# Patient Record
Sex: MALE | Race: WHITE | NOT HISPANIC OR LATINO | Employment: OTHER | URBAN - METROPOLITAN AREA
[De-identification: names, ages, dates, MRNs, and addresses within clinical notes are randomized per-mention and may not be internally consistent; named-entity substitution may affect disease eponyms.]

---

## 2019-04-29 ENCOUNTER — OFFICE VISIT (OUTPATIENT)
Dept: OPHTHALMOLOGY | Facility: CLINIC | Age: 64
End: 2019-04-29
Payer: MEDICARE

## 2019-04-29 DIAGNOSIS — H25.13 AGE-RELATED NUCLEAR CATARACT OF BOTH EYES: Primary | ICD-10-CM

## 2019-04-29 PROCEDURE — 92015 PR REFRACTION: ICD-10-PCS | Mod: S$GLB,,, | Performed by: OPHTHALMOLOGY

## 2019-04-29 PROCEDURE — 99999 PR PBB SHADOW E&M-NEW PATIENT-LVL III: ICD-10-PCS | Mod: PBBFAC,,, | Performed by: OPHTHALMOLOGY

## 2019-04-29 PROCEDURE — 92004 PR EYE EXAM, NEW PATIENT,COMPREHESV: ICD-10-PCS | Mod: S$GLB,,, | Performed by: OPHTHALMOLOGY

## 2019-04-29 PROCEDURE — 92015 DETERMINE REFRACTIVE STATE: CPT | Mod: S$GLB,,, | Performed by: OPHTHALMOLOGY

## 2019-04-29 PROCEDURE — 92004 COMPRE OPH EXAM NEW PT 1/>: CPT | Mod: S$GLB,,, | Performed by: OPHTHALMOLOGY

## 2019-04-29 PROCEDURE — 99999 PR PBB SHADOW E&M-NEW PATIENT-LVL III: CPT | Mod: PBBFAC,,, | Performed by: OPHTHALMOLOGY

## 2019-04-29 RX ORDER — LEVOTHYROXINE SODIUM 137 UG/1
TABLET ORAL
Refills: 0 | COMMUNITY
Start: 2019-04-25 | End: 2020-11-16

## 2019-04-29 RX ORDER — MONTELUKAST SODIUM 10 MG/1
10 TABLET ORAL DAILY
Refills: 3 | COMMUNITY
Start: 2019-03-16 | End: 2020-11-16 | Stop reason: SDUPTHER

## 2019-04-29 RX ORDER — TAMSULOSIN HYDROCHLORIDE 0.4 MG/1
1 CAPSULE ORAL DAILY
Refills: 3 | COMMUNITY
Start: 2019-04-25 | End: 2020-11-16

## 2019-04-29 RX ORDER — LISINOPRIL AND HYDROCHLOROTHIAZIDE 20; 25 MG/1; MG/1
1 TABLET ORAL DAILY
Refills: 3 | COMMUNITY
Start: 2019-03-16 | End: 2020-11-16 | Stop reason: SDUPTHER

## 2019-04-29 RX ORDER — ALLOPURINOL 300 MG/1
300 TABLET ORAL DAILY PRN
Refills: 3 | COMMUNITY
Start: 2019-04-25 | End: 2020-11-16 | Stop reason: SDUPTHER

## 2019-04-29 RX ORDER — METFORMIN HYDROCHLORIDE 500 MG/1
500 TABLET, EXTENDED RELEASE ORAL DAILY
Refills: 3 | COMMUNITY
Start: 2019-03-16 | End: 2020-11-16 | Stop reason: SDUPTHER

## 2019-04-29 NOTE — PROGRESS NOTES
HPI     Diabetic Eye Exam      Additional comments: DM eye exam              Comments     DLE: x 1 yr    Pt states decrease in dist va over past year. + he is a  and   has been having more trouble driving at night due to headlights. Too   bright and has halos. + h/o of floaters over past yr with occasional   flash.     LBSL: does not ck  LA1C: unknown            Last edited by Cheryle Quintana on 4/29/2019  2:54 PM. (History)        ROS     Negative for: Constitutional, Gastrointestinal, Neurological, Skin,   Genitourinary, Musculoskeletal, HENT, Endocrine, Cardiovascular, Eyes,   Respiratory, Psychiatric, Allergic/Imm, Heme/Lymph    Last edited by Gabe Moses Jr., MD on 4/29/2019  3:59 PM. (History)        Assessment /Plan     For exam results, see Encounter Report.    Age-related nuclear cataract of both eyes    -schedule cataract surgery Right eye  -R&B benefits discussed with patient  -Risks of worse vision, loss of vision, infection, bleeding, re-surgery,and may need to have surgery done by a different physician was explained to the patient  -patient was also counseled on premium lens options, laser cataract, and astigmatic correction  -patient was also counseled that they may still need glasses after surgery to help improve their vision, especially the need for reading glasses for reading after surgery  -patient understood the risks involved with cataract surgery and wanted to move forward with surgery  Follow up for Measurements, H&P, and consents.

## 2019-04-29 NOTE — PATIENT INSTRUCTIONS
After Cataract Surgery: Long-Term Eye Care    After cataract surgery, it is important to have regular eye exams. This is the best way to check the health of your eyes. It will help you maintain good vision. Schedule an eye exam at least once a year or as directed by your eye care professional.  New eyeglasses  Your vision will be better after surgery. But you may need new eyeglasses to fine-tune your vision. Your eye doctor will test your vision while youre healing. When youre fully healed, you will get a new eyeglass prescription if needed.  Treating a secondary cataract  Months or years after surgery, a secondary cataract may form. It is caused by cells that grow between your new lens and the capsule that holds it. This cataract is not painful. But it may cause vision problems similar to the first cataract. In most cases, this cataract can be treated in your eye doctors office or an outpatient surgical center.  Laser treatment (YAG capsulotomy) can be used for this. It is a painless procedure. It only takes a few minutes. A laser beam is used to make a small opening in the eyes capsule. This allows more light to enter. It will improve your vision right away.  Date Last Reviewed: 6/14/2015  © 5498-2502 [employer]. [address] [place] [address], [place] [address]. All rights reserved. This information is not intended as a substitute for professional medical care. Always follow your healthcare professional's instructions.

## 2019-04-30 ENCOUNTER — TELEPHONE (OUTPATIENT)
Dept: OPHTHALMOLOGY | Facility: CLINIC | Age: 64
End: 2019-04-30

## 2019-04-30 NOTE — TELEPHONE ENCOUNTER
----- Message from Marina Eugene sent at 4/30/2019  9:58 AM CDT -----  Contact: Wife Gabi   Wife Gabi need to speak with nurse Hodge regarding surgery date, please call back at [phone]. Patient is scheduled to fly to [place] 7/1, patient need the next available asap

## 2019-04-30 NOTE — TELEPHONE ENCOUNTER
Called pt and spoke with his wife to schedule cataract surgery with Dr Moses on 6/13 and pre-op on 5/31.

## 2019-05-31 ENCOUNTER — OFFICE VISIT (OUTPATIENT)
Dept: OPHTHALMOLOGY | Facility: CLINIC | Age: 64
End: 2019-05-31
Payer: MEDICARE

## 2019-05-31 VITALS — DIASTOLIC BLOOD PRESSURE: 73 MMHG | HEART RATE: 74 BPM | SYSTOLIC BLOOD PRESSURE: 117 MMHG

## 2019-05-31 DIAGNOSIS — H25.11 AGE-RELATED NUCLEAR CATARACT OF RIGHT EYE: Primary | ICD-10-CM

## 2019-05-31 PROCEDURE — 99499 UNLISTED E&M SERVICE: CPT | Mod: S$GLB,,, | Performed by: OPHTHALMOLOGY

## 2019-05-31 PROCEDURE — 99499 NO LOS: ICD-10-PCS | Mod: S$GLB,,, | Performed by: OPHTHALMOLOGY

## 2019-05-31 PROCEDURE — 99999 PR PBB SHADOW E&M-EST. PATIENT-LVL III: CPT | Mod: PBBFAC,,, | Performed by: OPHTHALMOLOGY

## 2019-05-31 PROCEDURE — 99999 PR PBB SHADOW E&M-EST. PATIENT-LVL III: ICD-10-PCS | Mod: PBBFAC,,, | Performed by: OPHTHALMOLOGY

## 2019-05-31 RX ORDER — PREDNISOLONE ACETATE 10 MG/ML
1 SUSPENSION/ DROPS OPHTHALMIC 4 TIMES DAILY
Qty: 10 ML | Refills: 3 | Status: SHIPPED | OUTPATIENT
Start: 2019-06-14 | End: 2019-07-20

## 2019-05-31 RX ORDER — PHENYLEPHRINE HYDROCHLORIDE 100 MG/ML
1 SOLUTION/ DROPS OPHTHALMIC
Status: CANCELLED | OUTPATIENT
Start: 2019-05-31 | End: 2019-05-31

## 2019-05-31 RX ORDER — DICLOFENAC SODIUM 1 MG/ML
1 SOLUTION/ DROPS OPHTHALMIC 4 TIMES DAILY
Qty: 5 ML | Refills: 3 | Status: SHIPPED | OUTPATIENT
Start: 2019-06-10 | End: 2019-07-20

## 2019-05-31 RX ORDER — PROPARACAINE HYDROCHLORIDE 5 MG/ML
1 SOLUTION/ DROPS OPHTHALMIC
Status: CANCELLED | OUTPATIENT
Start: 2019-05-31 | End: 2019-05-31

## 2019-05-31 RX ORDER — PHENYLEPHRINE HYDROCHLORIDE 25 MG/ML
1 SOLUTION/ DROPS OPHTHALMIC
Status: CANCELLED | OUTPATIENT
Start: 2019-05-31 | End: 2019-05-31

## 2019-05-31 RX ORDER — TROPICAMIDE 10 MG/ML
1 SOLUTION/ DROPS OPHTHALMIC
Status: CANCELLED | OUTPATIENT
Start: 2019-05-31 | End: 2019-05-31

## 2019-05-31 RX ORDER — MOXIFLOXACIN 5 MG/ML
1 SOLUTION/ DROPS OPHTHALMIC 4 TIMES DAILY
Qty: 3 ML | Refills: 1 | Status: SHIPPED | OUTPATIENT
Start: 2019-06-14 | End: 2019-07-01

## 2019-05-31 NOTE — H&P (VIEW-ONLY)
CC: H&P for cataract surgery  HPI: Patient has been diagnosed with cataracts, which are interfering with daily activities due to blurred vision and glare which cannot adequately be corrected with glasses.   PMH:  Past Medical History:   Diagnosis Date    Arthritis     Asthma     Cataract     Diabetic retinopathy     Gout attack     Hypertension        FH:  Family History   Problem Relation Age of Onset    Cancer Mother     Alzheimer's disease Father     No Known Problems Sister     No Known Problems Maternal Grandmother     No Known Problems Maternal Grandfather     No Known Problems Paternal Grandmother     No Known Problems Paternal Grandfather     No Known Problems Sister     No Known Problems Sister     Amblyopia Neg Hx     Blindness Neg Hx     Cataracts Neg Hx     Diabetes Neg Hx     Hypertension Neg Hx     Macular degeneration Neg Hx     Glaucoma Neg Hx     Retinal detachment Neg Hx     Strabismus Neg Hx     Stroke Neg Hx     Thyroid disease Neg Hx        SH:  Social History     Socioeconomic History    Marital status:      Spouse name: Not on file    Number of children: Not on file    Years of education: Not on file    Highest education level: Not on file   Occupational History    Not on file   Social Needs    Financial resource strain: Not on file    Food insecurity:     Worry: Not on file     Inability: Not on file    Transportation needs:     Medical: Not on file     Non-medical: Not on file   Tobacco Use    Smoking status: Former Smoker    Smokeless tobacco: Never Used    Tobacco comment: Quit 40 yrs ago   Substance and Sexual Activity    Alcohol use: Yes     Comment: Occasionally    Drug use: Never    Sexual activity: Not on file   Lifestyle    Physical activity:     Days per week: Not on file     Minutes per session: Not on file    Stress: Not on file   Relationships    Social connections:     Talks on phone: Not on file     Gets together: Not on file      Attends [employer]ist service: Not on file     Active member of club or organization: Not on file     Attends meetings of clubs or organizations: Not on file     Relationship status: Not on file   Other Topics Concern    Not on file   Social History Narrative    Not on file       ROS:  HEENT: Blurred vision  CV: No chest pain  Pulm: No SOB  Please see my last exam note for additional ROS details    PE:  BP:117/73  Pulse:74  HEENT: Cataract  CV: N S1 S2 no murmurs  Pulm: CTAB no wheezes, rales, or ronchi  Abd:soft nabs ntnd no masses  Extremeties: no edema    A/P  1. Cataract - Indications, risks and alternatives to the procedure were explained to the patient. The patient was given the opportunity to ask questions and consented to the procedure in writing.  Proceed with cataract surgery as scheduled.  2. Other health issues - patient has been cleared by their PCP. See last note for details.

## 2019-05-31 NOTE — H&P
CC: H&P for cataract surgery  HPI: Patient has been diagnosed with cataracts, which are interfering with daily activities due to blurred vision and glare which cannot adequately be corrected with glasses.   PMH:  Past Medical History:   Diagnosis Date    Arthritis     Asthma     Cataract     Diabetic retinopathy     Gout attack     Hypertension        FH:  Family History   Problem Relation Age of Onset    Cancer Mother     Alzheimer's disease Father     No Known Problems Sister     No Known Problems Maternal Grandmother     No Known Problems Maternal Grandfather     No Known Problems Paternal Grandmother     No Known Problems Paternal Grandfather     No Known Problems Sister     No Known Problems Sister     Amblyopia Neg Hx     Blindness Neg Hx     Cataracts Neg Hx     Diabetes Neg Hx     Hypertension Neg Hx     Macular degeneration Neg Hx     Glaucoma Neg Hx     Retinal detachment Neg Hx     Strabismus Neg Hx     Stroke Neg Hx     Thyroid disease Neg Hx        SH:  Social History     Socioeconomic History    Marital status:      Spouse name: Not on file    Number of children: Not on file    Years of education: Not on file    Highest education level: Not on file   Occupational History    Not on file   Social Needs    Financial resource strain: Not on file    Food insecurity:     Worry: Not on file     Inability: Not on file    Transportation needs:     Medical: Not on file     Non-medical: Not on file   Tobacco Use    Smoking status: Former Smoker    Smokeless tobacco: Never Used    Tobacco comment: Quit 40 yrs ago   Substance and Sexual Activity    Alcohol use: Yes     Comment: Occasionally    Drug use: Never    Sexual activity: Not on file   Lifestyle    Physical activity:     Days per week: Not on file     Minutes per session: Not on file    Stress: Not on file   Relationships    Social connections:     Talks on phone: Not on file     Gets together: Not on file      Attends Oriental orthodox service: Not on file     Active member of club or organization: Not on file     Attends meetings of clubs or organizations: Not on file     Relationship status: Not on file   Other Topics Concern    Not on file   Social History Narrative    Not on file       ROS:  HEENT: Blurred vision  CV: No chest pain  Pulm: No SOB  Please see my last exam note for additional ROS details    PE:  BP:117/73  Pulse:74  HEENT: Cataract  CV: N S1 S2 no murmurs  Pulm: CTAB no wheezes, rales, or ronchi  Abd:soft nabs ntnd no masses  Extremeties: no edema    A/P  1. Cataract - Indications, risks and alternatives to the procedure were explained to the patient. The patient was given the opportunity to ask questions and consented to the procedure in writing.  Proceed with cataract surgery as scheduled.  2. Other health issues - patient has been cleared by their PCP. See last note for details.

## 2019-05-31 NOTE — PROGRESS NOTES
HPI     Pre-op Exam      Additional comments: phaco iol OD to be d 6/13//              Comments     phaco iol OD to be d 6/13//          Last edited by Lauren Lama on 5/31/2019  3:24 PM. (History)        ROS     Negative for: Constitutional, Gastrointestinal, Neurological, Skin,   Genitourinary, Musculoskeletal, HENT, Endocrine, Cardiovascular, Eyes,   Respiratory, Psychiatric, Allergic/Imm, Heme/Lymph    Last edited by Gabe Moses Jr., MD on 5/31/2019  3:48 PM. (History)        Assessment /Plan     For exam results, see Encounter Report.    Age-related nuclear cataract of right eye + FLOMAX  -     Case Request Operating Room: PHACOEMULSIFICATION, CATARACT  -     diclofenac (VOLTAREN) 0.1 % ophthalmic solution; Place 1 drop into the right eye 4 (four) times daily. Start drops 3 days before surgery  Dispense: 5 mL; Refill: 3  -     moxifloxacin (VIGAMOX) 0.5 % ophthalmic solution; Place 1 drop into the right eye 4 (four) times daily. for 7 days  Dispense: 3 mL; Refill: 1  -     prednisoLONE acetate (PRED FORTE) 1 % DrpS; Place 1 drop into the right eye 4 (four) times daily.  Dispense: 10 mL; Refill: 3

## 2019-06-12 ENCOUNTER — ANESTHESIA EVENT (OUTPATIENT)
Dept: SURGERY | Facility: HOSPITAL | Age: 64
End: 2019-06-12
Payer: MEDICARE

## 2019-06-13 ENCOUNTER — ANESTHESIA (OUTPATIENT)
Dept: SURGERY | Facility: HOSPITAL | Age: 64
End: 2019-06-13
Payer: MEDICARE

## 2019-06-13 ENCOUNTER — HOSPITAL ENCOUNTER (OUTPATIENT)
Facility: HOSPITAL | Age: 64
Discharge: HOME | End: 2019-06-13
Attending: OPHTHALMOLOGY | Admitting: OPHTHALMOLOGY
Payer: MEDICARE

## 2019-06-13 DIAGNOSIS — H25.11 AGE-RELATED NUCLEAR CATARACT OF RIGHT EYE: ICD-10-CM

## 2019-06-13 LAB — GLUCOSE SERPL-MCNC: 79 MG/DL (ref 70–110)

## 2019-06-13 PROCEDURE — 25000003 PHARM REV CODE 250: Mod: PO | Performed by: ANESTHESIOLOGY

## 2019-06-13 PROCEDURE — 66984 PR REMOVAL, CATARACT, W/INSRT INTRAOC LENS, W/O ENDO CYCLO: ICD-10-PCS | Mod: RT,,, | Performed by: OPHTHALMOLOGY

## 2019-06-13 PROCEDURE — 66984 XCAPSL CTRC RMVL W/O ECP: CPT | Mod: RT,,, | Performed by: OPHTHALMOLOGY

## 2019-06-13 PROCEDURE — 63600175 PHARM REV CODE 636 W HCPCS: Mod: PO | Performed by: OPHTHALMOLOGY

## 2019-06-13 PROCEDURE — D9220A PRA ANESTHESIA: ICD-10-PCS | Mod: ANES,,, | Performed by: ANESTHESIOLOGY

## 2019-06-13 PROCEDURE — D9220A PRA ANESTHESIA: Mod: ANES,,, | Performed by: ANESTHESIOLOGY

## 2019-06-13 PROCEDURE — 82962 GLUCOSE BLOOD TEST: CPT | Mod: PO | Performed by: OPHTHALMOLOGY

## 2019-06-13 PROCEDURE — 37000008 HC ANESTHESIA 1ST 15 MINUTES: Mod: PO | Performed by: OPHTHALMOLOGY

## 2019-06-13 PROCEDURE — 36000706: Mod: PO | Performed by: OPHTHALMOLOGY

## 2019-06-13 PROCEDURE — D9220A PRA ANESTHESIA: ICD-10-PCS | Mod: CRNA,,, | Performed by: NURSE ANESTHETIST, CERTIFIED REGISTERED

## 2019-06-13 PROCEDURE — 63600175 PHARM REV CODE 636 W HCPCS: Mod: PO | Performed by: NURSE ANESTHETIST, CERTIFIED REGISTERED

## 2019-06-13 PROCEDURE — 71000033 HC RECOVERY, INTIAL HOUR: Mod: PO | Performed by: OPHTHALMOLOGY

## 2019-06-13 PROCEDURE — 37000009 HC ANESTHESIA EA ADD 15 MINS: Mod: PO | Performed by: OPHTHALMOLOGY

## 2019-06-13 PROCEDURE — 25000003 PHARM REV CODE 250: Mod: PO | Performed by: OPHTHALMOLOGY

## 2019-06-13 PROCEDURE — 71000015 HC POSTOP RECOV 1ST HR: Mod: PO | Performed by: OPHTHALMOLOGY

## 2019-06-13 PROCEDURE — V2632 POST CHMBR INTRAOCULAR LENS: HCPCS | Mod: PO | Performed by: OPHTHALMOLOGY

## 2019-06-13 PROCEDURE — 36000707: Mod: PO | Performed by: OPHTHALMOLOGY

## 2019-06-13 PROCEDURE — 25000003 PHARM REV CODE 250: Mod: PO

## 2019-06-13 PROCEDURE — D9220A PRA ANESTHESIA: Mod: CRNA,,, | Performed by: NURSE ANESTHETIST, CERTIFIED REGISTERED

## 2019-06-13 DEVICE — LENS 17.5: Type: IMPLANTABLE DEVICE | Site: EYE | Status: FUNCTIONAL

## 2019-06-13 RX ORDER — LIDOCAINE HYDROCHLORIDE 10 MG/ML
INJECTION INFILTRATION; PERINEURAL
Status: DISCONTINUED | OUTPATIENT
Start: 2019-06-13 | End: 2019-06-13 | Stop reason: HOSPADM

## 2019-06-13 RX ORDER — PROPARACAINE HYDROCHLORIDE 5 MG/ML
1 SOLUTION/ DROPS OPHTHALMIC
Status: COMPLETED | OUTPATIENT
Start: 2019-06-13 | End: 2019-06-13

## 2019-06-13 RX ORDER — ONDANSETRON HYDROCHLORIDE 2 MG/ML
INJECTION, SOLUTION INTRAMUSCULAR; INTRAVENOUS
Status: DISCONTINUED | OUTPATIENT
Start: 2019-06-13 | End: 2019-06-13

## 2019-06-13 RX ORDER — EPINEPHRINE 1 MG/ML
INJECTION INTRAMUSCULAR; INTRAVENOUS; SUBCUTANEOUS
Status: DISCONTINUED | OUTPATIENT
Start: 2019-06-13 | End: 2019-06-13 | Stop reason: HOSPADM

## 2019-06-13 RX ORDER — LIDOCAINE HYDROCHLORIDE 10 MG/ML
1 INJECTION, SOLUTION EPIDURAL; INFILTRATION; INTRACAUDAL; PERINEURAL ONCE
Status: DISCONTINUED | OUTPATIENT
Start: 2019-06-13 | End: 2019-06-13 | Stop reason: HOSPADM

## 2019-06-13 RX ORDER — MIDAZOLAM HYDROCHLORIDE 1 MG/ML
INJECTION INTRAMUSCULAR; INTRAVENOUS
Status: DISCONTINUED | OUTPATIENT
Start: 2019-06-13 | End: 2019-06-13

## 2019-06-13 RX ORDER — MOXIFLOXACIN 5 MG/ML
SOLUTION/ DROPS OPHTHALMIC
Status: DISCONTINUED | OUTPATIENT
Start: 2019-06-13 | End: 2019-06-13 | Stop reason: HOSPADM

## 2019-06-13 RX ORDER — PHENYLEPHRINE HYDROCHLORIDE 25 MG/ML
1 SOLUTION/ DROPS OPHTHALMIC
Status: COMPLETED | OUTPATIENT
Start: 2019-06-13 | End: 2019-06-13

## 2019-06-13 RX ORDER — SODIUM CHLORIDE, SODIUM LACTATE, POTASSIUM CHLORIDE, CALCIUM CHLORIDE 600; 310; 30; 20 MG/100ML; MG/100ML; MG/100ML; MG/100ML
INJECTION, SOLUTION INTRAVENOUS CONTINUOUS
Status: DISCONTINUED | OUTPATIENT
Start: 2019-06-13 | End: 2019-06-13 | Stop reason: HOSPADM

## 2019-06-13 RX ORDER — ACETAMINOPHEN 325 MG/1
650 TABLET ORAL EVERY 4 HOURS PRN
Status: DISCONTINUED | OUTPATIENT
Start: 2019-06-13 | End: 2019-06-13 | Stop reason: HOSPADM

## 2019-06-13 RX ORDER — TROPICAMIDE 10 MG/ML
1 SOLUTION/ DROPS OPHTHALMIC
Status: COMPLETED | OUTPATIENT
Start: 2019-06-13 | End: 2019-06-13

## 2019-06-13 RX ORDER — PHENYLEPHRINE HYDROCHLORIDE 100 MG/ML
1 SOLUTION/ DROPS OPHTHALMIC
Status: COMPLETED | OUTPATIENT
Start: 2019-06-13 | End: 2019-06-13

## 2019-06-13 RX ADMIN — PHENYLEPHRINE HYDROCHLORIDE 1 DROP: 25 SOLUTION OPHTHALMIC at 10:06

## 2019-06-13 RX ADMIN — PROPARACAINE HYDROCHLORIDE 1 DROP: 5 SOLUTION/ DROPS OPHTHALMIC at 10:06

## 2019-06-13 RX ADMIN — PHENYLEPHRINE HYDROCHLORIDE 1 DROP: 100 SOLUTION/ DROPS OPHTHALMIC at 09:06

## 2019-06-13 RX ADMIN — MIDAZOLAM HYDROCHLORIDE 1 MG: 1 INJECTION, SOLUTION INTRAMUSCULAR; INTRAVENOUS at 11:06

## 2019-06-13 RX ADMIN — SODIUM CHLORIDE, SODIUM LACTATE, POTASSIUM CHLORIDE, AND CALCIUM CHLORIDE: .6; .31; .03; .02 INJECTION, SOLUTION INTRAVENOUS at 10:06

## 2019-06-13 RX ADMIN — PROPARACAINE HYDROCHLORIDE 1 DROP: 5 SOLUTION/ DROPS OPHTHALMIC at 09:06

## 2019-06-13 RX ADMIN — TROPICAMIDE 1 DROP: 10 SOLUTION/ DROPS OPHTHALMIC at 09:06

## 2019-06-13 RX ADMIN — ONDANSETRON 4 MG: 2 INJECTION, SOLUTION INTRAMUSCULAR; INTRAVENOUS at 11:06

## 2019-06-13 RX ADMIN — TROPICAMIDE 1 DROP: 10 SOLUTION/ DROPS OPHTHALMIC at 10:06

## 2019-06-13 RX ADMIN — PHENYLEPHRINE HYDROCHLORIDE 1 DROP: 25 SOLUTION OPHTHALMIC at 09:06

## 2019-06-13 RX ADMIN — MIDAZOLAM HYDROCHLORIDE 1 MG: 1 INJECTION, SOLUTION INTRAMUSCULAR; INTRAVENOUS at 10:06

## 2019-06-13 NOTE — OP NOTE
Date of surgery: 6/13/2019    Operative Report    Preoperative Diagnosis: Nuclear sclerosis, right eye    Postoperative Diagnosis: Nuclear sclerosis, right eye    Procedure: Phacoemulsification with posterior chamber intraocular lens, right eye    Surgeon: Gabe Moses Jr. M.D.    Anesthesia: MAC with topical     Brief Preoperative Note:  The patient was seen in the office with cataract of the right eye.  Because of the impairment of the patient's reading, driving, ambulation, and other activities of daily living needing a good quality of vision, the patient elected to remove the cataract and place a acrylic lens implant, if possible    Procedure in detail:    Informed consent was obtained. Indications, risks and alternatives to the procedure were explained to the patient. The patient was given the opportunity to ask questions and consented to the procedure in writing. In the preoperative holding area, the patients identity and operative site were confirmed.  Topical anesthetic was administered, consisting of alternating 0.5% Proparacaine and 4% preservative-free Lidocaine Q 5 minutes times 3.  The patient was taken to the operative suite.  A time-out was performed.  The right eye was prepped with 5% Betadine and draped in sterile fashion.  A lid speculum was placed in the right eye.  The temporal clear corneal incision was developed using a LRI carline knife and crescent blade for a 3 plane incision.  A paracentesis was done with a 1mm carline blade.  Before instillation of the Viscoat, approximately 0.1 to 0.3cc of diluted preservative free intracameral lidocaine was instilled.  Viscoat was injected into the anterior chamber with a 27-gauge needle.  A 2.4mm carline blade was used to make a temporal clear corneal incision.  Afterwards, a cystotome was used to perform a continuous curvilinear capsulorrhexis with the assistance of utrata forceps.  Hydrodissection was performed using balanced salt  solution,injected under the anterior capsule using a brar cannula and hydrodelineation was performed using a blunt-tipped cannula.  Next, phacoemulsification performed in a divide and conquer fashion with the use of a nucleus rotator to turn the lens and protect the posterior capsule.  Cortical material was then removed using irrigation and aspiration.  Healon was then injected into the anterior chamber and into capsular bag to inflate the bag for the placement of the lens implant  and then an Jero SN60WF 17.5 D lens was injected into the capsular bag and rotated in position with the assistance of a Sinsky hook.  Irrigation and aspiration were used to remove the remaining viscoelastic. Next, Miochol was injected into the anterior chamber and the pupil was allowed to constrict in a symmetrical fashion. Wound closed with BSS.  A collagen shield was placed into the eye and the eye was covered with a Bronson shield.  The patient tolerated the procedure well and was transferred to the recovery area in good condition.  Estimated blood loss:  none  Specimens:   none  Complications:  none  Disposition:   stable to PACU

## 2019-06-13 NOTE — DISCHARGE INSTRUCTIONS
DR. SIVLERIO DISCHARGE INSTRUCTIONS:    DO NOT BEND AT THE WAIST  NO LIFTING HEAVIER THAN 10 LBS UNTIL AFTER YOUR FOLLOW UP  NO STRENUOUS ACTIVITY  NO EYE DROPS UNTIL SEEN BY DR. SILVERIO  DO NOT RUB OR SCRATCH YOUR EYE  WEAR SHIELD   WEAR SUN SHADES (PROVIDED) WHEN OUTSIDE      Discharge Instructions: After Your Surgery  Youve just had surgery. During surgery, you were given medicine called anesthesia to keep you relaxed and free of pain. After surgery, you may have some pain or nausea. This is common. Here are some tips for feeling better and getting well after surgery.     Stay on schedule with your medicine.   Going home  Your healthcare provider will show you how to take care of yourself when you go home. He or she will also answer your questions. Have an adult family member or friend drive you home. For the first 24 hours after your surgery:  · Do not drive or use heavy equipment.  · Do not make important decisions or sign legal papers.  · Do not drink alcohol.  · Have someone stay with you, if needed. He or she can watch for problems and help keep you safe.  Be sure to go to all follow-up visits with your healthcare provider. And rest after your surgery for as long as your healthcare provider tells you to.  Coping with pain  If you have pain after surgery, pain medicine will help you feel better. Take it as told, before pain becomes severe. Also, ask your healthcare provider or pharmacist about other ways to control pain. This might be with heat, ice, or relaxation. And follow any other instructions your surgeon or nurse gives you.  Tips for taking pain medicine  To get the best relief possible, remember these points:  · Pain medicines can upset your stomach. Taking them with a little food may help.  · Most pain relievers taken by mouth need at least 20 to 30 minutes to start to work.  · Taking medicine on a schedule can help you remember to take it. Try to time your medicine so that you can take it before  starting an activity. This might be before you get dressed, go for a walk, or sit down for dinner.  · Constipation is a common side effect of pain medicines. Call your healthcare provider before taking any medicines such as laxatives or stool softeners to help ease constipation. Also ask if you should skip any foods. Drinking lots of fluids and eating foods such as fruits and vegetables that are high in fiber can also help. Remember, do not take laxatives unless your surgeon has prescribed them.  · Drinking alcohol and taking pain medicine can cause dizziness and slow your breathing. It can even be deadly. Do not drink alcohol while taking pain medicine.  · Pain medicine can make you react more slowly to things. Do not drive or run machinery while taking pain medicine.  Your healthcare provider may tell you to take acetaminophen to help ease your pain. Ask him or her how much you are supposed to take each day. Acetaminophen or other pain relievers may interact with your prescription medicines or other over-the-counter (OTC) medicines. Some prescription medicines have acetaminophen and other ingredients. Using both prescription and OTC acetaminophen for pain can cause you to overdose. Read the labels on your OTC medicines with care. This will help you to clearly know the list of ingredients, how much to take, and any warnings. It may also help you not take too much acetaminophen. If you have questions or do not understand the information, ask your pharmacist or healthcare provider to explain it to you before you take the OTC medicine.  Managing nausea  Some people have an upset stomach after surgery. This is often because of anesthesia, pain, or pain medicine, or the stress of surgery. These tips will help you handle nausea and eat healthy foods as you get better. If you were on a special food plan before surgery, ask your healthcare provider if you should follow it while you get better. These tips may help:  · Do  not push yourself to eat. Your body will tell you when to eat and how much.  · Start off with clear liquids and soup. They are easier to digest.  · Next try semi-solid foods, such as mashed potatoes, applesauce, and gelatin, as you feel ready.  · Slowly move to solid foods. Dont eat fatty, rich, or spicy foods at first.  · Do not force yourself to have 3 large meals a day. Instead eat smaller amounts more often.  · Take pain medicines with a small amount of solid food, such as crackers or toast, to avoid nausea.     Call your surgeon if  · You still have pain an hour after taking medicine. The medicine may not be strong enough.  · You feel too sleepy, dizzy, or groggy. The medicine may be too strong.  · You have side effects like nausea, vomiting, or skin changes, such as rash, itching, or hives.       If you have obstructive sleep apnea  You were given anesthesia medicine during surgery to keep you comfortable and free of pain. After surgery, you may have more apnea spells because of this medicine and other medicines you were given. The spells may last longer than usual.   At home:  · Keep using the continuous positive airway pressure (CPAP) device when you sleep. Unless your healthcare provider tells you not to, use it when you sleep, day or night. CPAP is a common device used to treat obstructive sleep apnea.  · Talk with your provider before taking any pain medicine, muscle relaxants, or sedatives. Your provider will tell you about the possible dangers of taking these medicines.  Date Last Reviewed: 12/1/2016  © 6862-0624 [employer]. [address] [place] [address], [place] [address]. All rights reserved. This information is not intended as a substitute for professional medical care. Always follow your healthcare professional's instructions.

## 2019-06-13 NOTE — TRANSFER OF CARE
"Anesthesia Transfer of Care Note    Patient: Asim Wilson    Procedure(s) Performed: Procedure(s) (LRB):  PHACOEMULSIFICATION, CATARACT (Right)    Patient location: PACU    Anesthesia Type: MAC    Transport from OR: Transported from OR on room air with adequate spontaneous ventilation    Post pain: adequate analgesia    Post assessment: no apparent anesthetic complications and tolerated procedure well    Post vital signs: stable    Level of consciousness: awake, alert and oriented    Nausea/Vomiting: no nausea/vomiting    Complications: none    Transfer of care protocol was followed      Last vitals:   Visit Vitals  /70 (BP Location: Right arm, Patient Position: Sitting)   Pulse (!) 58   Temp 36.6 °C (97.9 °F) (Skin)   Resp 16   Ht 5' 11" (1.803 m)   Wt 117 kg (258 lb)   SpO2 96%   BMI 35.98 kg/m²     "

## 2019-06-13 NOTE — BRIEF OP NOTE
Operative Note     SUMMARY     Surgery Date: 6/13/2019     Surgeon(s) and Role:     * Gabe Moses Jr., MD - Primary    Pre-op Diagnosis:  Age-related nuclear cataract of right eye [H25.11]    Post-op Diagnosis:  Age-related nuclear cataract of right eye [H25.11]    Procedure(s) (LRB):  PHACOEMULSIFICATION, CATARACT (Right)    Anesthesia: Monitor Anesthesia Care    Findings/Key Components:  Same as post op diagnosis    Estimated Blood Loss: * No values recorded between 6/13/2019 11:10 AM and 6/13/2019 11:38 AM *         Specimens (From admission, onward)    None            Discharge Note      SUMMARY     Admit Date: 6/13/2019    Attending Physician: Gabe Moses Jr., MD     Discharge Physician: Gabe Moses Jr., MD    Discharge Date: 6/13/2019     Final Diagnosis: Age-related nuclear cataract of right eye [H25.11]    Hospital Course: The patient was admitted for outpatient surgery and tolerated the procedure well. The patient was discharged home in stable condition the same day.    Diet: Advance as tolerated    Follow-Up: Follow up with Dr. Moses, next day, as previously scheduled  Activity as tolerated.      Activity: Per Handout Instructions    Disposition: Home or self care    Patient Instructions:   Current Discharge Medication List      CONTINUE these medications which have NOT CHANGED    Details   allopurinol (ZYLOPRIM) 300 MG tablet Take 300 mg by mouth daily as needed.  Refills: 3      lisinopril-hydrochlorothiazide (PRINZIDE,ZESTORETIC) 20-25 mg Tab Take 1 tablet by mouth once daily.  Refills: 3      metFORMIN (GLUCOPHAGE-XR) 500 MG 24 hr tablet Take 500 mg by mouth once daily.  Refills: 3      montelukast (SINGULAIR) 10 mg tablet Take 10 mg by mouth once daily.  Refills: 3      SYNTHROID 137 mcg Tab tablet TAKE 1 TABLET (137 MCG) BY MOUTH DAILY EARLY MORNING BRAND NAME ONLY.  Refills: 0      diclofenac (VOLTAREN) 0.1 % ophthalmic solution Place 1 drop into the right eye 4 (four) times daily.  Start drops 3 days before surgery  Qty: 5 mL, Refills: 3    Associated Diagnoses: Age-related nuclear cataract of right eye      moxifloxacin (VIGAMOX) 0.5 % ophthalmic solution Place 1 drop into the right eye 4 (four) times daily. for 7 days  Qty: 3 mL, Refills: 1    Associated Diagnoses: Age-related nuclear cataract of right eye      prednisoLONE acetate (PRED FORTE) 1 % DrpS Place 1 drop into the right eye 4 (four) times daily.  Qty: 10 mL, Refills: 3    Associated Diagnoses: Age-related nuclear cataract of right eye      tamsulosin (FLOMAX) 0.4 mg Cap Take 1 capsule by mouth once daily.  Refills: 3             Discharge Procedure Orders (must include Diet, Follow-up, Activity)   Discharge Procedure Orders (must include Diet, Follow-up, Activity)   Diet general     Call MD for:  persistent nausea and vomiting     Call MD for:  severe uncontrolled pain     Leave dressing on - Keep it clean, dry, and intact until clinic visit

## 2019-06-13 NOTE — ANESTHESIA PREPROCEDURE EVALUATION
06/13/2019  Asim Wilson is a 63 y.o., male.    Anesthesia Evaluation    I have reviewed the Patient Summary Reports.    I have reviewed the Nursing Notes.   I have reviewed the Medications.     Review of Systems  Anesthesia Hx:  No problems with previous Anesthesia    Social:  Former Smoker, Alcohol Use    Cardiovascular:   Hypertension    Pulmonary:   Asthma Sleep Apnea    Musculoskeletal:   Arthritis     Endocrine:   Diabetes, type 2 Hypothyroidism        Physical Exam  General:  Obesity    Airway/Jaw/Neck:  Airway Findings: Mouth Opening: Normal Tongue: Normal  Mallampati: II  TM Distance: Normal, at least 6 cm        Eyes/Ears/Nose:  Eyes/Ears/Nose Findings:    Dental:  DENTAL FINDINGS: Normal   Chest/Lungs:  Chest/Lungs Findings: Clear to auscultation, Normal Respiratory Rate     Heart/Vascular:  Heart Findings: Rate: Normal  Rhythm: Regular Rhythm        Mental Status:  Mental Status Findings: Normal        Anesthesia Plan  Type of Anesthesia, risks & benefits discussed:  Anesthesia Type:  MAC  Patient's Preference:   Intra-op Monitoring Plan: standard ASA monitors  Intra-op Monitoring Plan Comments:   Post Op Pain Control Plan: IV/PO Opioids PRN  Post Op Pain Control Plan Comments:   Induction:   IV  Beta Blocker:  Patient is not currently on a Beta-Blocker (No further documentation required).       Informed Consent: Patient understands risks and agrees with Anesthesia plan.  Questions answered. Anesthesia consent signed with patient.  ASA Score: 3     Day of Surgery Review of History & Physical: I have interviewed and examined the patient. I have reviewed the patient's H&P dated:  There are no significant changes.  H&P update referred to the surgeon.         Ready For Surgery From Anesthesia Perspective.

## 2019-06-13 NOTE — INTERVAL H&P NOTE
"See PCP H&P. No changes noted. Heart and lungs per anesthesia. "This patient has been cleared for surgery in an ambulatory surgery center/facility."    "

## 2019-06-14 ENCOUNTER — OFFICE VISIT (OUTPATIENT)
Dept: OPHTHALMOLOGY | Facility: CLINIC | Age: 64
End: 2019-06-14
Payer: MEDICARE

## 2019-06-14 VITALS
DIASTOLIC BLOOD PRESSURE: 68 MMHG | SYSTOLIC BLOOD PRESSURE: 112 MMHG | HEART RATE: 57 BPM | HEIGHT: 71 IN | OXYGEN SATURATION: 98 % | WEIGHT: 258 LBS | TEMPERATURE: 98 F | RESPIRATION RATE: 16 BRPM | BODY MASS INDEX: 36.12 KG/M2

## 2019-06-14 DIAGNOSIS — Z96.1 STATUS POST CATARACT EXTRACTION AND INSERTION OF INTRAOCULAR LENS OF RIGHT EYE: Primary | ICD-10-CM

## 2019-06-14 DIAGNOSIS — Z98.41 STATUS POST CATARACT EXTRACTION AND INSERTION OF INTRAOCULAR LENS OF RIGHT EYE: Primary | ICD-10-CM

## 2019-06-14 PROCEDURE — 99024 POSTOP FOLLOW-UP VISIT: CPT | Mod: S$GLB,,, | Performed by: OPHTHALMOLOGY

## 2019-06-14 PROCEDURE — 99999 PR PBB SHADOW E&M-EST. PATIENT-LVL II: CPT | Mod: PBBFAC,,, | Performed by: OPHTHALMOLOGY

## 2019-06-14 PROCEDURE — 99024 PR POST-OP FOLLOW-UP VISIT: ICD-10-PCS | Mod: S$GLB,,, | Performed by: OPHTHALMOLOGY

## 2019-06-14 PROCEDURE — 99999 PR PBB SHADOW E&M-EST. PATIENT-LVL II: ICD-10-PCS | Mod: PBBFAC,,, | Performed by: OPHTHALMOLOGY

## 2019-06-14 RX ORDER — ERYTHROMYCIN 5 MG/G
OINTMENT OPHTHALMIC NIGHTLY
Qty: 3.5 G | Refills: 1 | Status: SHIPPED | OUTPATIENT
Start: 2019-06-14 | End: 2019-06-21

## 2019-06-14 NOTE — PROGRESS NOTES
HPI     Post-op Evaluation      Additional comments: 1 d po phaco iol OD d 6/13/2019//  Tech dropped   Diclo, Moxi, Pred into OD//              Comments     1 d po phaco iol OD d 6/13/2019//  Tech dropped Diclo, Moxi, Pred into   OD// irritation or ache goes away when pt closes his eye//  Still has   floaters no flashes//          Last edited by Lauren Lama on 6/14/2019  8:06 AM. (History)        ROS     Negative for: Constitutional, Gastrointestinal, Neurological, Skin,   Genitourinary, Musculoskeletal, HENT, Endocrine, Cardiovascular, Eyes,   Respiratory, Psychiatric, Allergic/Imm, Heme/Lymph    Last edited by Gabe Moses Jr., MD on 6/14/2019 11:10 AM. (History)        Assessment /Plan     For exam results, see Encounter Report.    Status post cataract extraction and insertion of intraocular lens of right eye  -     erythromycin (ROMYCIN) ophthalmic ointment; Place into the right eye every evening. for 7 days  Dispense: 3.5 g; Refill: 1    Prednisolone acetate (pink or white top drop) 1 drop 4x daily Right eye; shake well before using drop  Vigamox 1 drop 4x daily right eye (tan top)  Diclofenac 1 drop 4x daily right eye (gray top)  E-mycin qhs OD  No bending no lifting  Keep head elevated when laying down  Keep dry   F/u 1 week

## 2019-06-14 NOTE — ANESTHESIA POSTPROCEDURE EVALUATION
Anesthesia Post Evaluation    Patient: Asim Wilson    Procedure(s) Performed: Procedure(s) (LRB):  PHACOEMULSIFICATION, CATARACT (Right)    Final Anesthesia Type: MAC  Patient location during evaluation: PACU  Patient participation: Yes- Able to Participate  Level of consciousness: awake and alert  Post-procedure vital signs: reviewed and stable  Pain management: adequate  Airway patency: patent  PONV status at discharge: No PONV  Anesthetic complications: no      Cardiovascular status: hemodynamically stable  Respiratory status: unassisted and room air  Hydration status: euvolemic  Follow-up not needed.          Vitals Value Taken Time   /68 6/13/2019 11:59 AM   Temp 36.7 °C (98 °F) 6/13/2019 11:39 AM   Pulse 57 6/13/2019 11:59 AM   Resp 16 6/13/2019 11:59 AM   SpO2 98 % 6/13/2019 11:59 AM         Event Time     Out of Recovery 11:59:00          Pain/Bravo Score: Bravo Score: 10 (6/13/2019 11:59 AM)

## 2019-06-19 ENCOUNTER — OFFICE VISIT (OUTPATIENT)
Dept: OPHTHALMOLOGY | Facility: CLINIC | Age: 64
End: 2019-06-19
Payer: MEDICARE

## 2019-06-19 DIAGNOSIS — Z96.1 STATUS POST CATARACT EXTRACTION AND INSERTION OF INTRAOCULAR LENS OF RIGHT EYE: Primary | ICD-10-CM

## 2019-06-19 DIAGNOSIS — Z98.41 STATUS POST CATARACT EXTRACTION AND INSERTION OF INTRAOCULAR LENS OF RIGHT EYE: Primary | ICD-10-CM

## 2019-06-19 PROCEDURE — 99999 PR PBB SHADOW E&M-EST. PATIENT-LVL II: CPT | Mod: PBBFAC,,, | Performed by: OPHTHALMOLOGY

## 2019-06-19 PROCEDURE — 99999 PR PBB SHADOW E&M-EST. PATIENT-LVL II: ICD-10-PCS | Mod: PBBFAC,,, | Performed by: OPHTHALMOLOGY

## 2019-06-19 PROCEDURE — 99024 PR POST-OP FOLLOW-UP VISIT: ICD-10-PCS | Mod: S$GLB,,, | Performed by: OPHTHALMOLOGY

## 2019-06-19 PROCEDURE — 99024 POSTOP FOLLOW-UP VISIT: CPT | Mod: S$GLB,,, | Performed by: OPHTHALMOLOGY

## 2019-06-19 NOTE — PROGRESS NOTES
HPI     1 week s/p phaco OD 6/13/19    Pt denies any eye pain. [facility]. Using gtts as directed.    Last edited by Tatyana Pearce on 6/19/2019  2:55 PM. (History)            Assessment /Plan     For exam results, see Encounter Report.    Status post cataract extraction and insertion of intraocular lens of right eye    Stop Moxifloxacin  Decrease PF and Diclofenac 1x daily OD  Follow up in about 3 weeks (around 7/10/2019) for POM #1 visit cataract surgery.

## 2019-06-26 ENCOUNTER — OFFICE VISIT (OUTPATIENT)
Dept: OPHTHALMOLOGY | Facility: CLINIC | Age: 64
End: 2019-06-26
Payer: MEDICARE

## 2019-06-26 VITALS — SYSTOLIC BLOOD PRESSURE: 103 MMHG | HEART RATE: 68 BPM | DIASTOLIC BLOOD PRESSURE: 69 MMHG

## 2019-06-26 DIAGNOSIS — Z96.1 STATUS POST CATARACT EXTRACTION AND INSERTION OF INTRAOCULAR LENS OF RIGHT EYE: Primary | ICD-10-CM

## 2019-06-26 DIAGNOSIS — H25.12 AGE-RELATED NUCLEAR CATARACT OF LEFT EYE: ICD-10-CM

## 2019-06-26 DIAGNOSIS — Z98.41 STATUS POST CATARACT EXTRACTION AND INSERTION OF INTRAOCULAR LENS OF RIGHT EYE: Primary | ICD-10-CM

## 2019-06-26 PROCEDURE — 99999 PR PBB SHADOW E&M-EST. PATIENT-LVL IV: ICD-10-PCS | Mod: PBBFAC,,, | Performed by: OPHTHALMOLOGY

## 2019-06-26 PROCEDURE — 99999 PR PBB SHADOW E&M-EST. PATIENT-LVL IV: CPT | Mod: PBBFAC,,, | Performed by: OPHTHALMOLOGY

## 2019-06-26 PROCEDURE — 99024 POSTOP FOLLOW-UP VISIT: CPT | Mod: S$GLB,,, | Performed by: OPHTHALMOLOGY

## 2019-06-26 PROCEDURE — 99024 PR POST-OP FOLLOW-UP VISIT: ICD-10-PCS | Mod: S$GLB,,, | Performed by: OPHTHALMOLOGY

## 2019-06-26 RX ORDER — PROPARACAINE HYDROCHLORIDE 5 MG/ML
1 SOLUTION/ DROPS OPHTHALMIC
Status: CANCELLED | OUTPATIENT
Start: 2019-06-26 | End: 2019-06-26

## 2019-06-26 RX ORDER — TROPICAMIDE 10 MG/ML
1 SOLUTION/ DROPS OPHTHALMIC
Status: CANCELLED | OUTPATIENT
Start: 2019-06-26 | End: 2019-06-26

## 2019-06-26 RX ORDER — PHENYLEPHRINE HYDROCHLORIDE 25 MG/ML
1 SOLUTION/ DROPS OPHTHALMIC
Status: CANCELLED | OUTPATIENT
Start: 2019-06-26 | End: 2019-06-26

## 2019-06-26 RX ORDER — PHENYLEPHRINE HYDROCHLORIDE 100 MG/ML
1 SOLUTION/ DROPS OPHTHALMIC
Status: CANCELLED | OUTPATIENT
Start: 2019-06-26 | End: 2019-06-26

## 2019-06-26 NOTE — PATIENT INSTRUCTIONS
Prednisolone acetate 1 drop 4x daily right eye x 1 week  Then 2x daily right eye x 1 week  Then 1x daily right eye x 1 week  Stop moxifloxacin  Diclofenac 1 drop 1x daily right eye

## 2019-06-26 NOTE — H&P
CC: H&P for cataract surgery  HPI: Patient has been diagnosed with cataracts, which are interfering with daily activities due to blurred vision and glare which cannot adequately be corrected with glasses.   PMH:  Past Medical History:   Diagnosis Date    Arthritis     Asthma     Cataract     OS    Diabetic retinopathy     Gout attack     Hypertension     GERONIMO (obstructive sleep apnea)     Thyroid disease        FH:  Family History   Problem Relation Age of Onset    Cancer Mother     Alzheimer's disease Father     No Known Problems Sister     No Known Problems Maternal Grandmother     No Known Problems Maternal Grandfather     No Known Problems Paternal Grandmother     No Known Problems Paternal Grandfather     No Known Problems Sister     No Known Problems Sister     Amblyopia Neg Hx     Blindness Neg Hx     Cataracts Neg Hx     Diabetes Neg Hx     Hypertension Neg Hx     Macular degeneration Neg Hx     Glaucoma Neg Hx     Retinal detachment Neg Hx     Strabismus Neg Hx     Stroke Neg Hx     Thyroid disease Neg Hx        SH:  Social History     Socioeconomic History    Marital status:      Spouse name: Not on file    Number of children: Not on file    Years of education: Not on file    Highest education level: Not on file   Occupational History    Not on file   Social Needs    Financial resource strain: Not on file    Food insecurity:     Worry: Not on file     Inability: Not on file    Transportation needs:     Medical: Not on file     Non-medical: Not on file   Tobacco Use    Smoking status: Former Smoker    Smokeless tobacco: Never Used    Tobacco comment: Quit 40 yrs ago   Substance and Sexual Activity    Alcohol use: Yes     Comment: Occasionally    Drug use: Never    Sexual activity: Not on file   Lifestyle    Physical activity:     Days per week: Not on file     Minutes per session: Not on file    Stress: Not on file   Relationships    Social connections:      Talks on phone: Not on file     Gets together: Not on file     Attends [employer] service: Not on file     Active member of club or organization: Not on file     Attends meetings of clubs or organizations: Not on file     Relationship status: Not on file   Other Topics Concern    Not on file   Social History Narrative    Not on file       ROS:  HEENT: Blurred vision  CV: No chest pain   Pulm: No SOB  Please see my last exam note for additional ROS details    PE:  BP:103/69  Pulse:68  HEENT: Cataract  CV: N S1 S2 no murmurs  Pulm: CTAB no wheezes, rales, or ronchi  Abd: soft nabs NTND no masses  Extremeties: no edema    A/P  1. Cataract - Indications, risks and alternatives to the procedure were explained to the patient. The patient was given the opportunity to ask questions and consented to the procedure in writing.  Proceed with cataract surgery as scheduled.  2. Other health issues - patient has been cleared by their PCP. See last note for details.

## 2019-06-26 NOTE — H&P (VIEW-ONLY)
CC: H&P for cataract surgery  HPI: Patient has been diagnosed with cataracts, which are interfering with daily activities due to blurred vision and glare which cannot adequately be corrected with glasses.   PMH:  Past Medical History:   Diagnosis Date    Arthritis     Asthma     Cataract     OS    Diabetic retinopathy     Gout attack     Hypertension     GERONIMO (obstructive sleep apnea)     Thyroid disease        FH:  Family History   Problem Relation Age of Onset    Cancer Mother     Alzheimer's disease Father     No Known Problems Sister     No Known Problems Maternal Grandmother     No Known Problems Maternal Grandfather     No Known Problems Paternal Grandmother     No Known Problems Paternal Grandfather     No Known Problems Sister     No Known Problems Sister     Amblyopia Neg Hx     Blindness Neg Hx     Cataracts Neg Hx     Diabetes Neg Hx     Hypertension Neg Hx     Macular degeneration Neg Hx     Glaucoma Neg Hx     Retinal detachment Neg Hx     Strabismus Neg Hx     Stroke Neg Hx     Thyroid disease Neg Hx        SH:  Social History     Socioeconomic History    Marital status:      Spouse name: Not on file    Number of children: Not on file    Years of education: Not on file    Highest education level: Not on file   Occupational History    Not on file   Social Needs    Financial resource strain: Not on file    Food insecurity:     Worry: Not on file     Inability: Not on file    Transportation needs:     Medical: Not on file     Non-medical: Not on file   Tobacco Use    Smoking status: Former Smoker    Smokeless tobacco: Never Used    Tobacco comment: Quit 40 yrs ago   Substance and Sexual Activity    Alcohol use: Yes     Comment: Occasionally    Drug use: Never    Sexual activity: Not on file   Lifestyle    Physical activity:     Days per week: Not on file     Minutes per session: Not on file    Stress: Not on file   Relationships    Social connections:      Talks on phone: Not on file     Gets together: Not on file     Attends Congregational service: Not on file     Active member of club or organization: Not on file     Attends meetings of clubs or organizations: Not on file     Relationship status: Not on file   Other Topics Concern    Not on file   Social History Narrative    Not on file       ROS:  HEENT: Blurred vision  CV: No chest pain   Pulm: No SOB  Please see my last exam note for additional ROS details    PE:  BP:103/69  Pulse:68  HEENT: Cataract  CV: N S1 S2 no murmurs  Pulm: CTAB no wheezes, rales, or ronchi  Abd: soft nabs NTND no masses  Extremeties: no edema    A/P  1. Cataract - Indications, risks and alternatives to the procedure were explained to the patient. The patient was given the opportunity to ask questions and consented to the procedure in writing.  Proceed with cataract surgery as scheduled.  2. Other health issues - patient has been cleared by their PCP. See last note for details.

## 2019-06-26 NOTE — PROGRESS NOTES
HPI     Post-op Evaluation      Additional comments: 1 wk sp phaco iol OD d 6/13/2019//  still using   Pred 8 x d, diclo 4 x d //no vigamox//              Comments     1 wk sp phaco iol OD d 6/13/2019//  still using Pred 8 x d, diclo 4 x d   //no vigamox//              Last edited by Lauren Lama on 6/26/2019  3:33 PM. (History)            Assessment /Plan     For exam results, see Encounter Report.    Age-related nuclear cataract of left eye  -     Case Request Operating Room: PHACOEMULSIFICATION, CATARACT    Status post cataract extraction and insertion of intraocular lens of right eye  Prednisolone acetate 1 drop 4x daily right eye x 1 week  Then 2x daily right eye x 1 week  Then 1x daily right eye x 1 week  Stop moxifloxacin  Diclofenac 1 drop 1x daily right eye    F/u 3 weeks

## 2019-07-17 ENCOUNTER — ANESTHESIA EVENT (OUTPATIENT)
Dept: SURGERY | Facility: HOSPITAL | Age: 64
End: 2019-07-17
Payer: MEDICARE

## 2019-07-18 ENCOUNTER — ANESTHESIA (OUTPATIENT)
Dept: SURGERY | Facility: HOSPITAL | Age: 64
End: 2019-07-18
Payer: MEDICARE

## 2019-07-18 ENCOUNTER — HOSPITAL ENCOUNTER (OUTPATIENT)
Facility: HOSPITAL | Age: 64
Discharge: HOME | End: 2019-07-18
Attending: OPHTHALMOLOGY | Admitting: OPHTHALMOLOGY
Payer: MEDICARE

## 2019-07-18 VITALS
RESPIRATION RATE: 16 BRPM | SYSTOLIC BLOOD PRESSURE: 110 MMHG | WEIGHT: 265 LBS | DIASTOLIC BLOOD PRESSURE: 68 MMHG | HEART RATE: 63 BPM | BODY MASS INDEX: 37.1 KG/M2 | TEMPERATURE: 97 F | HEIGHT: 71 IN | OXYGEN SATURATION: 99 %

## 2019-07-18 DIAGNOSIS — H25.12 AGE-RELATED NUCLEAR CATARACT OF LEFT EYE: ICD-10-CM

## 2019-07-18 LAB — GLUCOSE SERPL-MCNC: 84 MG/DL (ref 70–110)

## 2019-07-18 PROCEDURE — 25000003 PHARM REV CODE 250: Mod: PO

## 2019-07-18 PROCEDURE — 71000033 HC RECOVERY, INTIAL HOUR: Mod: PO | Performed by: OPHTHALMOLOGY

## 2019-07-18 PROCEDURE — D9220A PRA ANESTHESIA: Mod: CRNA,,, | Performed by: NURSE ANESTHETIST, CERTIFIED REGISTERED

## 2019-07-18 PROCEDURE — 63600175 PHARM REV CODE 636 W HCPCS: Mod: PO | Performed by: NURSE ANESTHETIST, CERTIFIED REGISTERED

## 2019-07-18 PROCEDURE — 37000008 HC ANESTHESIA 1ST 15 MINUTES: Mod: PO | Performed by: OPHTHALMOLOGY

## 2019-07-18 PROCEDURE — 25000003 PHARM REV CODE 250: Mod: PO | Performed by: OPHTHALMOLOGY

## 2019-07-18 PROCEDURE — 25000003 PHARM REV CODE 250: Mod: PO | Performed by: NURSE ANESTHETIST, CERTIFIED REGISTERED

## 2019-07-18 PROCEDURE — D9220A PRA ANESTHESIA: Mod: ANES,,, | Performed by: ANESTHESIOLOGY

## 2019-07-18 PROCEDURE — 63600175 PHARM REV CODE 636 W HCPCS: Mod: PO | Performed by: OPHTHALMOLOGY

## 2019-07-18 PROCEDURE — 37000009 HC ANESTHESIA EA ADD 15 MINS: Mod: PO | Performed by: OPHTHALMOLOGY

## 2019-07-18 PROCEDURE — 82962 GLUCOSE BLOOD TEST: CPT | Mod: PO | Performed by: OPHTHALMOLOGY

## 2019-07-18 PROCEDURE — D9220A PRA ANESTHESIA: ICD-10-PCS | Mod: CRNA,,, | Performed by: NURSE ANESTHETIST, CERTIFIED REGISTERED

## 2019-07-18 PROCEDURE — 66984 PR REMOVAL, CATARACT, W/INSRT INTRAOC LENS, W/O ENDO CYCLO: ICD-10-PCS | Mod: 79,LT,, | Performed by: OPHTHALMOLOGY

## 2019-07-18 PROCEDURE — D9220A PRA ANESTHESIA: ICD-10-PCS | Mod: ANES,,, | Performed by: ANESTHESIOLOGY

## 2019-07-18 PROCEDURE — 36000707: Mod: PO | Performed by: OPHTHALMOLOGY

## 2019-07-18 PROCEDURE — 25000003 PHARM REV CODE 250: Mod: PO | Performed by: ANESTHESIOLOGY

## 2019-07-18 PROCEDURE — V2632 POST CHMBR INTRAOCULAR LENS: HCPCS | Mod: PO | Performed by: OPHTHALMOLOGY

## 2019-07-18 PROCEDURE — 66984 XCAPSL CTRC RMVL W/O ECP: CPT | Mod: 79,LT,, | Performed by: OPHTHALMOLOGY

## 2019-07-18 PROCEDURE — 71000015 HC POSTOP RECOV 1ST HR: Mod: PO | Performed by: OPHTHALMOLOGY

## 2019-07-18 PROCEDURE — 36000706: Mod: PO | Performed by: OPHTHALMOLOGY

## 2019-07-18 DEVICE — LENS 18.5: Type: IMPLANTABLE DEVICE | Site: EYE | Status: FUNCTIONAL

## 2019-07-18 RX ORDER — TROPICAMIDE 10 MG/ML
1 SOLUTION/ DROPS OPHTHALMIC
Status: COMPLETED | OUTPATIENT
Start: 2019-07-18 | End: 2019-07-18

## 2019-07-18 RX ORDER — MIDAZOLAM HYDROCHLORIDE 1 MG/ML
INJECTION, SOLUTION INTRAMUSCULAR; INTRAVENOUS
Status: DISCONTINUED | OUTPATIENT
Start: 2019-07-18 | End: 2019-07-18

## 2019-07-18 RX ORDER — PHENYLEPHRINE HYDROCHLORIDE 100 MG/ML
1 SOLUTION/ DROPS OPHTHALMIC
Status: COMPLETED | OUTPATIENT
Start: 2019-07-18 | End: 2019-07-18

## 2019-07-18 RX ORDER — SODIUM CHLORIDE, SODIUM LACTATE, POTASSIUM CHLORIDE, CALCIUM CHLORIDE 600; 310; 30; 20 MG/100ML; MG/100ML; MG/100ML; MG/100ML
INJECTION, SOLUTION INTRAVENOUS CONTINUOUS
Status: DISCONTINUED | OUTPATIENT
Start: 2019-07-18 | End: 2019-07-18 | Stop reason: HOSPADM

## 2019-07-18 RX ORDER — LIDOCAINE HYDROCHLORIDE 10 MG/ML
INJECTION INFILTRATION; PERINEURAL
Status: DISCONTINUED | OUTPATIENT
Start: 2019-07-18 | End: 2019-07-18 | Stop reason: HOSPADM

## 2019-07-18 RX ORDER — ACETAMINOPHEN 325 MG/1
650 TABLET ORAL EVERY 4 HOURS PRN
Status: CANCELLED | OUTPATIENT
Start: 2019-07-18

## 2019-07-18 RX ORDER — LIDOCAINE HYDROCHLORIDE 10 MG/ML
1 INJECTION, SOLUTION EPIDURAL; INFILTRATION; INTRACAUDAL; PERINEURAL ONCE
Status: DISCONTINUED | OUTPATIENT
Start: 2019-07-18 | End: 2019-07-18 | Stop reason: HOSPADM

## 2019-07-18 RX ORDER — SODIUM CHLORIDE 0.9 % (FLUSH) 0.9 %
10 SYRINGE (ML) INJECTION
Status: DISCONTINUED | OUTPATIENT
Start: 2019-07-18 | End: 2019-07-18 | Stop reason: HOSPADM

## 2019-07-18 RX ORDER — LIDOCAINE HYDROCHLORIDE 40 MG/ML
INJECTION, SOLUTION RETROBULBAR
Status: DISCONTINUED | OUTPATIENT
Start: 2019-07-18 | End: 2019-07-18

## 2019-07-18 RX ORDER — PROPARACAINE HYDROCHLORIDE 5 MG/ML
1 SOLUTION/ DROPS OPHTHALMIC
Status: COMPLETED | OUTPATIENT
Start: 2019-07-18 | End: 2019-07-18

## 2019-07-18 RX ORDER — MOXIFLOXACIN 5 MG/ML
SOLUTION/ DROPS OPHTHALMIC
Status: DISCONTINUED | OUTPATIENT
Start: 2019-07-18 | End: 2019-07-18 | Stop reason: HOSPADM

## 2019-07-18 RX ORDER — ONDANSETRON 2 MG/ML
INJECTION INTRAMUSCULAR; INTRAVENOUS
Status: DISCONTINUED | OUTPATIENT
Start: 2019-07-18 | End: 2019-07-18

## 2019-07-18 RX ORDER — EPINEPHRINE 1 MG/ML
INJECTION INTRAMUSCULAR; INTRAVENOUS; SUBCUTANEOUS
Status: DISCONTINUED | OUTPATIENT
Start: 2019-07-18 | End: 2019-07-18 | Stop reason: HOSPADM

## 2019-07-18 RX ORDER — PHENYLEPHRINE HYDROCHLORIDE 25 MG/ML
1 SOLUTION/ DROPS OPHTHALMIC
Status: COMPLETED | OUTPATIENT
Start: 2019-07-18 | End: 2019-07-18

## 2019-07-18 RX ADMIN — PHENYLEPHRINE HYDROCHLORIDE 1 DROP: 100 SOLUTION/ DROPS OPHTHALMIC at 12:07

## 2019-07-18 RX ADMIN — PHENYLEPHRINE HYDROCHLORIDE 1 DROP: 25 SOLUTION OPHTHALMIC at 12:07

## 2019-07-18 RX ADMIN — TROPICAMIDE 1 DROP: 10 SOLUTION/ DROPS OPHTHALMIC at 12:07

## 2019-07-18 RX ADMIN — PROPARACAINE HYDROCHLORIDE 1 DROP: 5 SOLUTION/ DROPS OPHTHALMIC at 12:07

## 2019-07-18 RX ADMIN — SODIUM CHLORIDE, SODIUM LACTATE, POTASSIUM CHLORIDE, AND CALCIUM CHLORIDE: .6; .31; .03; .02 INJECTION, SOLUTION INTRAVENOUS at 12:07

## 2019-07-18 RX ADMIN — ONDANSETRON 4 MG: 2 INJECTION, SOLUTION INTRAMUSCULAR; INTRAVENOUS at 02:07

## 2019-07-18 RX ADMIN — LIDOCAINE HYDROCHLORIDE 5 DROP: 40 SOLUTION RETROBULBAR; TOPICAL at 02:07

## 2019-07-18 RX ADMIN — MIDAZOLAM HYDROCHLORIDE 2 MG: 1 INJECTION, SOLUTION INTRAMUSCULAR; INTRAVENOUS at 02:07

## 2019-07-18 NOTE — TRANSFER OF CARE
"Anesthesia Transfer of Care Note    Patient: Asim Wilson    Procedure(s) Performed: Procedure(s) (LRB):  PHACOEMULSIFICATION, CATARACT (Left)    Patient location: PACU    Anesthesia Type: MAC    Transport from OR: Transported from OR on room air with adequate spontaneous ventilation    Post pain: adequate analgesia    Post assessment: no apparent anesthetic complications    Post vital signs: stable    Level of consciousness: awake    Nausea/Vomiting: no nausea/vomiting    Complications: none    Transfer of care protocol was followed      Last vitals:   Visit Vitals  /62 (BP Location: Right arm, Patient Position: Lying)   Pulse 62   Temp 35.9 °C (96.7 °F) (Skin)   Resp 16   Ht 5' 11" (1.803 m)   Wt 120.2 kg (265 lb)   SpO2 99%   BMI 36.96 kg/m²     "

## 2019-07-18 NOTE — DISCHARGE INSTRUCTIONS
Eye Care     1. No bending or lifting.  2. Sit upright in bed or in recliner.  3. Do not remove shield.  4. Do not use drops in surgical eye.   5. If lens falls out of eye, do not attempt to replace it.  6. Follow up tomorrow in clinic.  7. Bring black bag with drops to clinic.

## 2019-07-18 NOTE — ANESTHESIA PREPROCEDURE EVALUATION
07/18/2019  Asim Wilson is a 63 y.o., male.    Pre-op Assessment    I have reviewed the Patient Summary Reports.     I have reviewed the Nursing Notes.   I have reviewed the Medications.     Review of Systems  Anesthesia Hx:  No problems with previous Anesthesia    Social:  Former Smoker, Alcohol Use    Cardiovascular:   Hypertension    Pulmonary:   Asthma Sleep Apnea    Musculoskeletal:   Arthritis     Endocrine:   Diabetes, type 2 Hypothyroidism        Physical Exam  General:  Obesity    Airway/Jaw/Neck:  Airway Findings: Mouth Opening: Normal Tongue: Normal  Mallampati: II  TM Distance: Normal, at least 6 cm        Eyes/Ears/Nose:  Eyes/Ears/Nose Findings:    Dental:  DENTAL FINDINGS: Normal   Chest/Lungs:  Chest/Lungs Findings: Clear to auscultation, Normal Respiratory Rate     Heart/Vascular:  Heart Findings: Rate: Normal  Rhythm: Regular Rhythm        Mental Status:  Mental Status Findings: Normal        Anesthesia Plan  Type of Anesthesia, risks & benefits discussed:  Anesthesia Type:  MAC  Patient's Preference:   Intra-op Monitoring Plan: standard ASA monitors  Intra-op Monitoring Plan Comments:   Post Op Pain Control Plan:   Post Op Pain Control Plan Comments:   Induction:   IV  Beta Blocker:  Patient is not currently on a Beta-Blocker (No further documentation required).       Informed Consent: Patient understands risks and agrees with Anesthesia plan.  Questions answered. Anesthesia consent signed with patient.  ASA Score: 3     Day of Surgery Review of History & Physical:    H&P update referred to the surgeon.         Ready For Surgery From Anesthesia Perspective.

## 2019-07-18 NOTE — OP NOTE
Date of surgery: 7/18/2019    Operative Report    Preoperative Diagnosis: Nuclear sclerosis, left eye    Postoperative Diagnosis: Nuclear sclerosis, left eye    Procedure: Phacoemulsification with posterior chamber intraocular lens, left eye    Surgeon: Gabe Moses Jr. M.D.    Anesthesia: MAC with topical     Brief Preoperative Note:  The patient was seen in the office with cataract of the left eye.  Because of the impairment of the patient's reading, driving, ambulation, and other activities of daily living needing a good quality of vision, the patient elected to remove the cataract and place a acrylic lens implant, if possible    Procedure in detail:    Informed consent was obtained. Indications, risks and alternatives to the procedure were explained to the patient. The patient was given the opportunity to ask questions and consented to the procedure in writing. In the preoperative holding area, the patients identity and operative site were confirmed.  Topical anesthetic was administered, consisting of alternating 0.5% Proparacaine and 4% preservative-free Lidocaine Q 5 minutes times 3.  The patient was taken to the operative suite.  A time-out was performed.  The left eye was prepped with 5% Betadine and draped in sterile fashion.  A lid speculum was placed in the left eye.  The temporal clear corneal incision was developed using a LRI carline knife and crescent blade for a 3 plane incision.  A paracentesis was done with a 1mm carline blade.  Before instillation of the Viscoat, approximately 0.1 to 0.3cc of diluted preservative free intracameral lidocaine was instilled.  Viscoat was injected into the anterior chamber with a 27-gauge needle.  A 2.4mm carline blade was used to make a temporal clear corneal incision.  Afterwards, a cystotome was used to perform a continuous curvilinear capsulorrhexis with the assistance of utrata forceps.  Hydrodissection was performed using balanced salt solution,injected  under the anterior capsule using a brar cannula and hydrodelineation was performed using a blunt-tipped cannula.  Next, phacoemulsification performed in a divide and conquer fashion with the use of a nucleus rotator to turn the lens and protect the posterior capsule.  Cortical material was then removed using irrigation and aspiration.  Healon was then injected into the anterior chamber and into capsular bag to inflate the bag for the placement of the lens implant and then an Jero [id number] 18.5 D lens was injected into the capsular bag and rotated in position with the assistance of a Sinsky hook.  Irrigation and aspiration were used to remove the remaining viscoelastic. Next, Miochol was injected into the anterior chamber and the pupil was allowed to constrict in a symmetrical fashion. Wound closed with BSS  A collagen shield was placed into the eye and the eye was covered with a Bronson shield.  The patient tolerated the procedure well and was transferred to the recovery area in good condition.  Estimated blood loss:  none  Specimens:   none  Complications:  none  Disposition:   stable to PACU

## 2019-07-18 NOTE — BRIEF OP NOTE
Operative Note     SUMMARY     Surgery Date: 7/18/2019     Surgeon(s) and Role:     * Gabe Moses Jr., MD - Primary    Pre-op Diagnosis:  Age-related nuclear cataract of left eye [H25.12]    Post-op Diagnosis:  Age-related nuclear cataract of left eye [H25.12]    Procedure(s) (LRB):  PHACOEMULSIFICATION, CATARACT (Left)    Anesthesia: Monitor Anesthesia Care    Findings/Key Components:  Same as post op diagnosis    Estimated Blood Loss: * No values recorded between 7/18/2019  2:17 PM and 7/18/2019  2:46 PM *         Specimens (From admission, onward)    None            Discharge Note      SUMMARY     Admit Date: 7/18/2019    Attending Physician: Gabe Moses Jr., MD     Discharge Physician: Gabe Moses Jr., MD    Discharge Date: 7/18/2019     Final Diagnosis: Age-related nuclear cataract of left eye [H25.12]    Hospital Course: The patient was admitted for outpatient surgery and tolerated the procedure well. The patient was discharged home in stable condition the same day.    Diet: Advance as tolerated    Follow-Up: Follow up with Dr. Moses, next day, as previously scheduled  Activity as tolerated.      Activity: Per Handout Instructions    Disposition: Home or self care    Patient Instructions:   Current Discharge Medication List      CONTINUE these medications which have NOT CHANGED    Details   allopurinol (ZYLOPRIM) 300 MG tablet Take 300 mg by mouth daily as needed.  Refills: 3      diclofenac (VOLTAREN) 0.1 % ophthalmic solution Place 1 drop into the right eye 4 (four) times daily. Start drops 3 days before surgery  Qty: 5 mL, Refills: 3    Associated Diagnoses: Age-related nuclear cataract of right eye      lisinopril-hydrochlorothiazide (PRINZIDE,ZESTORETIC) 20-25 mg Tab Take 1 tablet by mouth once daily.  Refills: 3      metFORMIN (GLUCOPHAGE-XR) 500 MG 24 hr tablet Take 500 mg by mouth once daily.  Refills: 3      montelukast (SINGULAIR) 10 mg tablet Take 10 mg by mouth once daily.  Refills:  3      SYNTHROID 137 mcg Tab tablet TAKE 1 TABLET (137 MCG) BY MOUTH DAILY EARLY MORNING BRAND NAME ONLY.  Refills: 0      prednisoLONE acetate (PRED FORTE) 1 % DrpS Place 1 drop into the right eye 4 (four) times daily.  Qty: 10 mL, Refills: 3    Associated Diagnoses: Age-related nuclear cataract of right eye      tamsulosin (FLOMAX) 0.4 mg Cap Take 1 capsule by mouth once daily.  Refills: 3             Discharge Procedure Orders (must include Diet, Follow-up, Activity)   Discharge Procedure Orders (must include Diet, Follow-up, Activity)   Diet general     Call MD for:  persistent nausea and vomiting     Call MD for:  severe uncontrolled pain     Leave dressing on - Keep it clean, dry, and intact until clinic visit     Activity as tolerated

## 2019-07-18 NOTE — ANESTHESIA POSTPROCEDURE EVALUATION
Anesthesia Post Evaluation    Patient: Asim Wilson    Procedure(s) Performed: Procedure(s) (LRB):  PHACOEMULSIFICATION, CATARACT (Left)    Final Anesthesia Type: MAC  Patient location during evaluation: PACU  Patient participation: Yes- Able to Participate  Level of consciousness: awake and alert, oriented and awake  Post-procedure vital signs: reviewed and stable  Pain management: adequate  Airway patency: patent  PONV status at discharge: No PONV  Anesthetic complications: no      Cardiovascular status: blood pressure returned to baseline and hemodynamically stable  Respiratory status: unassisted, spontaneous ventilation and room air  Hydration status: euvolemic  Follow-up not needed.          Vitals Value Taken Time   /68 7/18/2019  2:55 PM   Temp 35.9 °C (96.7 °F) 7/18/2019  2:45 PM   Pulse 63 7/18/2019  2:55 PM   Resp 16 7/18/2019  2:55 PM   SpO2 99 % 7/18/2019  2:55 PM         Event Time     Out of Recovery 14:46:00          Pain/Bravo Score: Bravo Score: 10 (7/18/2019  2:45 PM)

## 2019-07-19 ENCOUNTER — OFFICE VISIT (OUTPATIENT)
Dept: OPHTHALMOLOGY | Facility: CLINIC | Age: 64
End: 2019-07-19
Payer: MEDICARE

## 2019-07-19 DIAGNOSIS — Z98.42 STATUS POST CATARACT EXTRACTION AND INSERTION OF INTRAOCULAR LENS OF LEFT EYE: Primary | ICD-10-CM

## 2019-07-19 DIAGNOSIS — Z96.1 STATUS POST CATARACT EXTRACTION AND INSERTION OF INTRAOCULAR LENS OF LEFT EYE: Primary | ICD-10-CM

## 2019-07-19 PROCEDURE — 99024 POSTOP FOLLOW-UP VISIT: CPT | Mod: S$GLB,,, | Performed by: OPHTHALMOLOGY

## 2019-07-19 PROCEDURE — 99999 PR PBB SHADOW E&M-EST. PATIENT-LVL II: CPT | Mod: PBBFAC,,, | Performed by: OPHTHALMOLOGY

## 2019-07-19 PROCEDURE — 99999 PR PBB SHADOW E&M-EST. PATIENT-LVL II: ICD-10-PCS | Mod: PBBFAC,,, | Performed by: OPHTHALMOLOGY

## 2019-07-19 PROCEDURE — 99024 PR POST-OP FOLLOW-UP VISIT: ICD-10-PCS | Mod: S$GLB,,, | Performed by: OPHTHALMOLOGY

## 2019-07-19 RX ORDER — MOXIFLOXACIN 5 MG/ML
1 SOLUTION/ DROPS OPHTHALMIC 4 TIMES DAILY
COMMUNITY
End: 2020-11-16

## 2019-07-24 ENCOUNTER — OFFICE VISIT (OUTPATIENT)
Dept: OPHTHALMOLOGY | Facility: CLINIC | Age: 64
End: 2019-07-24
Payer: MEDICARE

## 2019-07-24 DIAGNOSIS — Z98.42 STATUS POST CATARACT EXTRACTION AND INSERTION OF INTRAOCULAR LENS OF LEFT EYE: Primary | ICD-10-CM

## 2019-07-24 DIAGNOSIS — Z96.1 STATUS POST CATARACT EXTRACTION AND INSERTION OF INTRAOCULAR LENS OF LEFT EYE: Primary | ICD-10-CM

## 2019-07-24 PROCEDURE — 99024 PR POST-OP FOLLOW-UP VISIT: ICD-10-PCS | Mod: S$GLB,,, | Performed by: OPHTHALMOLOGY

## 2019-07-24 PROCEDURE — 99999 PR PBB SHADOW E&M-EST. PATIENT-LVL II: ICD-10-PCS | Mod: PBBFAC,,, | Performed by: OPHTHALMOLOGY

## 2019-07-24 PROCEDURE — 99999 PR PBB SHADOW E&M-EST. PATIENT-LVL II: CPT | Mod: PBBFAC,,, | Performed by: OPHTHALMOLOGY

## 2019-07-24 PROCEDURE — 99024 POSTOP FOLLOW-UP VISIT: CPT | Mod: S$GLB,,, | Performed by: OPHTHALMOLOGY

## 2019-07-24 RX ORDER — DICLOFENAC SODIUM 1 MG/ML
1 SOLUTION/ DROPS OPHTHALMIC DAILY
COMMUNITY
End: 2020-11-16

## 2019-07-24 RX ORDER — PREDNISOLONE ACETATE 10 MG/ML
1 SUSPENSION/ DROPS OPHTHALMIC DAILY
COMMUNITY
End: 2020-11-16

## 2019-07-24 NOTE — PROGRESS NOTES
HPI     Post-op Evaluation      Additional comments: 1 wk sp phaco iol OS d 7/18//  drops instilled as   directed//              Comments     1 wk sp phaco iol OS d 7/18//  drops instilled as directed//          Last edited by Lauren Lama on 7/24/2019 10:39 AM. (History)        ROS     Negative for: Constitutional, Gastrointestinal, Neurological, Skin,   Genitourinary, Musculoskeletal, HENT, Endocrine, Cardiovascular, Eyes,   Respiratory, Psychiatric, Allergic/Imm, Heme/Lymph    Last edited by Gabe Moses Jr., MD on 7/24/2019 11:20 AM. (History)        Assessment /Plan     For exam results, see Encounter Report.    Status post cataract extraction and insertion of intraocular lens of left eye    Stop Moxifloxacin  Decrease PF and Diclofenac 1x daily OS  Follow up in about 3 weeks (around 8/14/2019) for POM #1 visit cataract surgery.

## 2019-08-21 ENCOUNTER — OFFICE VISIT (OUTPATIENT)
Dept: OPHTHALMOLOGY | Facility: CLINIC | Age: 64
End: 2019-08-21
Payer: MEDICARE

## 2019-08-21 DIAGNOSIS — Z96.1 STATUS POST CATARACT EXTRACTION AND INSERTION OF INTRAOCULAR LENS OF LEFT EYE: Primary | ICD-10-CM

## 2019-08-21 DIAGNOSIS — Z98.41 STATUS POST CATARACT EXTRACTION AND INSERTION OF INTRAOCULAR LENS OF RIGHT EYE: ICD-10-CM

## 2019-08-21 DIAGNOSIS — Z98.42 STATUS POST CATARACT EXTRACTION AND INSERTION OF INTRAOCULAR LENS OF LEFT EYE: Primary | ICD-10-CM

## 2019-08-21 DIAGNOSIS — Z96.1 STATUS POST CATARACT EXTRACTION AND INSERTION OF INTRAOCULAR LENS OF RIGHT EYE: ICD-10-CM

## 2019-08-21 PROCEDURE — 99024 PR POST-OP FOLLOW-UP VISIT: ICD-10-PCS | Mod: S$GLB,,, | Performed by: OPHTHALMOLOGY

## 2019-08-21 PROCEDURE — 99999 PR PBB SHADOW E&M-EST. PATIENT-LVL II: ICD-10-PCS | Mod: PBBFAC,,, | Performed by: OPHTHALMOLOGY

## 2019-08-21 PROCEDURE — 99999 PR PBB SHADOW E&M-EST. PATIENT-LVL II: CPT | Mod: PBBFAC,,, | Performed by: OPHTHALMOLOGY

## 2019-08-21 PROCEDURE — 99024 POSTOP FOLLOW-UP VISIT: CPT | Mod: S$GLB,,, | Performed by: OPHTHALMOLOGY

## 2019-08-21 NOTE — PROGRESS NOTES
HPI     Post-op Evaluation      Additional comments: 1 month s/p phaco iol OS               Comments     Pt states doing well OU and seeing well.          Last edited by Cheryle Quintana on 8/21/2019  1:18 PM. (History)            Assessment /Plan     For exam results, see Encounter Report.    Status post cataract extraction and insertion of intraocular lens of left eye    Status post cataract extraction and insertion of intraocular lens of right eye    satisfactory course, doing well  OTC readers prn  Follow up in about 1 year (around 8/21/2020) for Annual.

## 2020-10-13 ENCOUNTER — OFFICE VISIT (OUTPATIENT)
Dept: FAMILY MEDICINE | Facility: CLINIC | Age: 65
End: 2020-10-13
Payer: MEDICARE

## 2020-10-13 VITALS
HEART RATE: 58 BPM | WEIGHT: 272.69 LBS | TEMPERATURE: 98 F | OXYGEN SATURATION: 97 % | HEIGHT: 71 IN | SYSTOLIC BLOOD PRESSURE: 128 MMHG | DIASTOLIC BLOOD PRESSURE: 70 MMHG | BODY MASS INDEX: 38.18 KG/M2

## 2020-10-13 DIAGNOSIS — H93.13 TINNITUS OF BOTH EARS: ICD-10-CM

## 2020-10-13 DIAGNOSIS — Z11.59 NEED FOR HEPATITIS C SCREENING TEST: ICD-10-CM

## 2020-10-13 DIAGNOSIS — R73.03 PREDIABETES: ICD-10-CM

## 2020-10-13 DIAGNOSIS — R53.83 FATIGUE, UNSPECIFIED TYPE: ICD-10-CM

## 2020-10-13 DIAGNOSIS — M10.9 GOUT, UNSPECIFIED CAUSE, UNSPECIFIED CHRONICITY, UNSPECIFIED SITE: ICD-10-CM

## 2020-10-13 DIAGNOSIS — R07.89 ATYPICAL CHEST PAIN: Primary | ICD-10-CM

## 2020-10-13 DIAGNOSIS — Z13.6 SCREENING FOR ABDOMINAL AORTIC ANEURYSM: ICD-10-CM

## 2020-10-13 DIAGNOSIS — E03.9 HYPOTHYROIDISM, UNSPECIFIED TYPE: ICD-10-CM

## 2020-10-13 DIAGNOSIS — Z12.11 SCREENING FOR COLON CANCER: ICD-10-CM

## 2020-10-13 DIAGNOSIS — E78.5 HYPERLIPIDEMIA, UNSPECIFIED HYPERLIPIDEMIA TYPE: ICD-10-CM

## 2020-10-13 PROCEDURE — 1125F PR PAIN SEVERITY QUANTIFIED, PAIN PRESENT: ICD-10-PCS | Mod: S$GLB,,, | Performed by: PHYSICIAN ASSISTANT

## 2020-10-13 PROCEDURE — 99204 OFFICE O/P NEW MOD 45 MIN: CPT | Mod: S$GLB,,, | Performed by: PHYSICIAN ASSISTANT

## 2020-10-13 PROCEDURE — 1101F PT FALLS ASSESS-DOCD LE1/YR: CPT | Mod: CPTII,S$GLB,, | Performed by: PHYSICIAN ASSISTANT

## 2020-10-13 PROCEDURE — 99204 PR OFFICE/OUTPT VISIT, NEW, LEVL IV, 45-59 MIN: ICD-10-PCS | Mod: S$GLB,,, | Performed by: PHYSICIAN ASSISTANT

## 2020-10-13 PROCEDURE — 1159F PR MEDICATION LIST DOCUMENTED IN MEDICAL RECORD: ICD-10-PCS | Mod: S$GLB,,, | Performed by: PHYSICIAN ASSISTANT

## 2020-10-13 PROCEDURE — 3008F BODY MASS INDEX DOCD: CPT | Mod: CPTII,S$GLB,, | Performed by: PHYSICIAN ASSISTANT

## 2020-10-13 PROCEDURE — 3008F PR BODY MASS INDEX (BMI) DOCUMENTED: ICD-10-PCS | Mod: CPTII,S$GLB,, | Performed by: PHYSICIAN ASSISTANT

## 2020-10-13 PROCEDURE — 1101F PR PT FALLS ASSESS DOC 0-1 FALLS W/OUT INJ PAST YR: ICD-10-PCS | Mod: CPTII,S$GLB,, | Performed by: PHYSICIAN ASSISTANT

## 2020-10-13 PROCEDURE — 1159F MED LIST DOCD IN RCRD: CPT | Mod: S$GLB,,, | Performed by: PHYSICIAN ASSISTANT

## 2020-10-13 PROCEDURE — 1125F AMNT PAIN NOTED PAIN PRSNT: CPT | Mod: S$GLB,,, | Performed by: PHYSICIAN ASSISTANT

## 2020-10-13 NOTE — PROGRESS NOTES
"Subjective:       Patient ID: Asim Wilson is a 65 y.o. male.    Chief Complaint: Establish Care    Chest Pain   This is a new (Patient has been experiencing intermitent "cold sweating" ) problem. The current episode started 1 to 4 weeks ago. The problem occurs daily. The problem has been waxing and waning. The pain is at a severity of 0/10. The patient is experiencing no pain. The pain does not radiate. Associated symptoms include dizziness and weakness. Pertinent negatives include no abdominal pain, exertional chest pressure, fever, headaches, irregular heartbeat, nausea, numbness or shortness of breath. The pain is aggravated by nothing. He has tried nothing for the symptoms. Risk factors include being elderly, lack of exercise, male gender, obesity and sedentary lifestyle.   His past medical history is significant for diabetes and hypertension.   Diabetes  He presents for his initial diabetic visit. He has type 2 diabetes mellitus. Hypoglycemia symptoms include dizziness. Pertinent negatives for hypoglycemia include no headaches. Associated symptoms include chest pain, fatigue and weakness. There are no hypoglycemic complications. There are no diabetic complications. Risk factors for coronary artery disease include diabetes mellitus, dyslipidemia, obesity, male sex, sedentary lifestyle and hypertension. Current diabetic treatment includes oral agent (monotherapy). He is compliant with treatment most of the time. An ACE inhibitor/angiotensin II receptor blocker is being taken.     Past Medical History:   Diagnosis Date    Arthritis     Asthma     Cataract     Done OU    Diabetic retinopathy     Gout attack     Hypertension     GERONIMO (obstructive sleep apnea)     noncompliant CPAP    Thyroid disease        Past Surgical History:   Procedure Laterality Date    CATARACT EXTRACTION W/  INTRAOCULAR LENS IMPLANT Right 06/13/2019    Dr Moses    CATARACT EXTRACTION W/  INTRAOCULAR LENS IMPLANT Left " 07/18/2019    Dr Moses    Cyst removed      From back of neck    PHACOEMULSIFICATION OF CATARACT Right 6/13/2019    Procedure: PHACOEMULSIFICATION, CATARACT;  Surgeon: Gabe Moses Jr., MD;  Location: [facility] OR;  Service: Ophthalmology;  Laterality: Right;  Right    PHACOEMULSIFICATION OF CATARACT Left 7/18/2019    Procedure: PHACOEMULSIFICATION, CATARACT;  Surgeon: Gabe Moses Jr., MD;  Location: [facility] OR;  Service: Ophthalmology;  Laterality: Left;  Left       Family History   Problem Relation Age of Onset    Cancer Mother     Alzheimer's disease Father     No Known Problems Sister     No Known Problems Maternal Grandmother     No Known Problems Maternal Grandfather     No Known Problems Paternal Grandmother     No Known Problems Paternal Grandfather     No Known Problems Sister     No Known Problems Sister     Amblyopia Neg Hx     Blindness Neg Hx     Cataracts Neg Hx     Diabetes Neg Hx     Hypertension Neg Hx     Macular degeneration Neg Hx     Glaucoma Neg Hx     Retinal detachment Neg Hx     Strabismus Neg Hx     Stroke Neg Hx     Thyroid disease Neg Hx        Social History     Socioeconomic History    Marital status:      Spouse name: Not on file    Number of children: Not on file    Years of education: Not on file    Highest education level: Not on file   Occupational History    Not on file   Social Needs    Financial resource strain: Not on file    Food insecurity     Worry: Not on file     Inability: Not on file    Transportation needs     Medical: Not on file     Non-medical: Not on file   Tobacco Use    Smoking status: Former Smoker    Smokeless tobacco: Never Used    Tobacco comment: Quit 40 yrs ago   Substance and Sexual Activity    Alcohol use: Yes     Comment: Occasionally    Drug use: Never    Sexual activity: Not on file   Lifestyle    Physical activity     Days per week: Not on file     Minutes per session: Not on file    Stress: Not on file  "  Relationships    Social connections     Talks on phone: Not on file     Gets together: Not on file     Attends Latter day service: Not on file     Active member of club or organization: Not on file     Attends meetings of clubs or organizations: Not on file     Relationship status: Not on file   Other Topics Concern    Not on file   Social History Narrative    Not on file       Review of patient's allergies indicates:  No Known Allergies      Current Outpatient Medications:     allopurinol (ZYLOPRIM) 300 MG tablet, Take 300 mg by mouth daily as needed., Disp: , Rfl: 3    lisinopril-hydrochlorothiazide (PRINZIDE,ZESTORETIC) 20-25 mg Tab, Take 1 tablet by mouth once daily., Disp: , Rfl: 3    metFORMIN (GLUCOPHAGE-XR) 500 MG 24 hr tablet, Take 500 mg by mouth once daily., Disp: , Rfl: 3    montelukast (SINGULAIR) 10 mg tablet, Take 10 mg by mouth once daily., Disp: , Rfl: 3    SYNTHROID 137 mcg Tab tablet, TAKE 1 TABLET (137 MCG) BY MOUTH DAILY EARLY MORNING BRAND NAME ONLY., Disp: , Rfl: 0    diclofenac (VOLTAREN) 0.1 % ophthalmic solution, Place 1 drop into the left eye once daily., Disp: , Rfl:     moxifloxacin (VIGAMOX) 0.5 % ophthalmic solution, Place 1 drop into the left eye 4 (four) times daily., Disp: , Rfl:     prednisoLONE acetate (PRED FORTE) 1 % DrpS, Place 1 drop into the left eye once daily., Disp: , Rfl:     tamsulosin (FLOMAX) 0.4 mg Cap, Take 1 capsule by mouth once daily., Disp: , Rfl: 3    /70   Pulse (!) 58   Temp 98.4 °F (36.9 °C)   Ht 5' 11" (1.803 m)   Wt 123.7 kg (272 lb 11.3 oz)   SpO2 97%   BMI 38.04 kg/m²   Review of Systems   Constitutional: Positive for fatigue. Negative for activity change, appetite change, chills and fever.   Respiratory: Negative for chest tightness and shortness of breath.    Cardiovascular: Positive for chest pain. Negative for leg swelling.   Gastrointestinal: Negative for abdominal pain and nausea.   Neurological: Positive for dizziness and " weakness. Negative for numbness and headaches.         Objective:      Physical Exam  Vitals signs reviewed.   Constitutional:       General: He is not in acute distress.     Appearance: Normal appearance. He is obese. He is not ill-appearing, toxic-appearing or diaphoretic.   HENT:      Head: Normocephalic and atraumatic.      Right Ear: Tympanic membrane, ear canal and external ear normal. There is no impacted cerumen.      Left Ear: Tympanic membrane, ear canal and external ear normal. There is no impacted cerumen.      Nose: Nose normal. No congestion or rhinorrhea.      Mouth/Throat:      Mouth: Mucous membranes are moist.      Pharynx: No oropharyngeal exudate or posterior oropharyngeal erythema.   Neck:      Musculoskeletal: Normal range of motion.   Cardiovascular:      Rate and Rhythm: Normal rate and regular rhythm.      Pulses: Normal pulses.      Heart sounds: Normal heart sounds. No murmur. No friction rub. No gallop.    Pulmonary:      Effort: Pulmonary effort is normal. No respiratory distress.      Breath sounds: Normal breath sounds. No stridor. No wheezing, rhonchi or rales.   Chest:      Chest wall: No tenderness.   Abdominal:      General: Abdomen is flat. Bowel sounds are normal. There is no distension.      Palpations: There is no mass.      Tenderness: There is no abdominal tenderness. There is no right CVA tenderness, left CVA tenderness, guarding or rebound.      Hernia: No hernia is present.   Musculoskeletal:         General: No swelling.      Right lower leg: Edema present.      Left lower leg: Edema present.      Comments: Trace edema   Neurological:      General: No focal deficit present.      Mental Status: He is alert.   Psychiatric:         Mood and Affect: Mood normal.         Assessment:       1. Atypical chest pain    2. Prediabetes    3. Gout, unspecified cause, unspecified chronicity, unspecified site    4. Hyperlipidemia, unspecified hyperlipidemia type    5. Hypothyroidism,  unspecified type    6. Fatigue, unspecified type    7. Screening for abdominal aortic aneurysm    8. Need for hepatitis C screening test    9. Screening for colon cancer        Plan:     reviewed care gaps    Atypical chest pain  -     Ambulatory referral/consult to Cardiology; Future; Expected date: 10/20/2020    Prediabetes  -     Comprehensive Metabolic Panel; Future; Expected date: 10/13/2020  -     Hemoglobin A1C; Future; Expected date: 10/13/2020    Gout, unspecified cause, unspecified chronicity, unspecified site  -     Comprehensive Metabolic Panel; Future; Expected date: 10/13/2020  -     Uric Acid; Future; Expected date: 10/13/2020    Hyperlipidemia, unspecified hyperlipidemia type  -     Comprehensive Metabolic Panel; Future; Expected date: 10/13/2020  -     Lipid Panel; Future; Expected date: 10/13/2020    Hypothyroidism, unspecified type  -     TSH; Future; Expected date: 10/13/2020    Fatigue, unspecified type  -     Testosterone Panel; Future; Expected date: 10/13/2020    Screening for abdominal aortic aneurysm  -     US Abdominal Aorta; Future; Expected date: 10/13/2020    Need for hepatitis C screening test  -     Hepatitis C Antibody; Future; Expected date: 10/13/2020    Screening for colon cancer  -     Case request GI: COLONOSCOPY    Tinnitus of both ears

## 2020-10-14 ENCOUNTER — LAB VISIT (OUTPATIENT)
Dept: FAMILY MEDICINE | Facility: CLINIC | Age: 65
End: 2020-10-14
Payer: MEDICARE

## 2020-10-14 DIAGNOSIS — E03.9 HYPOTHYROIDISM, UNSPECIFIED TYPE: ICD-10-CM

## 2020-10-14 DIAGNOSIS — Z11.59 NEED FOR HEPATITIS C SCREENING TEST: ICD-10-CM

## 2020-10-14 DIAGNOSIS — M10.9 GOUT, UNSPECIFIED CAUSE, UNSPECIFIED CHRONICITY, UNSPECIFIED SITE: ICD-10-CM

## 2020-10-14 DIAGNOSIS — R53.83 FATIGUE, UNSPECIFIED TYPE: ICD-10-CM

## 2020-10-14 DIAGNOSIS — E78.5 HYPERLIPIDEMIA, UNSPECIFIED HYPERLIPIDEMIA TYPE: ICD-10-CM

## 2020-10-14 DIAGNOSIS — R73.03 PREDIABETES: ICD-10-CM

## 2020-10-14 LAB
ALBUMIN SERPL BCP-MCNC: 3.7 G/DL (ref 3.5–5.2)
ALP SERPL-CCNC: 56 U/L (ref 55–135)
ALT SERPL W/O P-5'-P-CCNC: 43 U/L (ref 10–44)
ANION GAP SERPL CALC-SCNC: 10 MMOL/L (ref 8–16)
AST SERPL-CCNC: 30 U/L (ref 10–40)
BILIRUB SERPL-MCNC: 0.5 MG/DL (ref 0.1–1)
BUN SERPL-MCNC: 21 MG/DL (ref 8–23)
CALCIUM SERPL-MCNC: 9.5 MG/DL (ref 8.7–10.5)
CHLORIDE SERPL-SCNC: 108 MMOL/L (ref 95–110)
CHOLEST SERPL-MCNC: 184 MG/DL (ref 120–199)
CHOLEST/HDLC SERPL: 5.3 {RATIO} (ref 2–5)
CO2 SERPL-SCNC: 23 MMOL/L (ref 23–29)
CREAT SERPL-MCNC: 1.4 MG/DL (ref 0.5–1.4)
EST. GFR  (AFRICAN AMERICAN): 59.7 ML/MIN/1.73 M^2
EST. GFR  (NON AFRICAN AMERICAN): 51.6 ML/MIN/1.73 M^2
GLUCOSE SERPL-MCNC: 90 MG/DL (ref 70–110)
HDLC SERPL-MCNC: 35 MG/DL (ref 40–75)
HDLC SERPL: 19 % (ref 20–50)
LDLC SERPL CALC-MCNC: 117 MG/DL (ref 63–159)
NONHDLC SERPL-MCNC: 149 MG/DL
POTASSIUM SERPL-SCNC: 4.5 MMOL/L (ref 3.5–5.1)
PROT SERPL-MCNC: 7.8 G/DL (ref 6–8.4)
SODIUM SERPL-SCNC: 141 MMOL/L (ref 136–145)
TRIGL SERPL-MCNC: 160 MG/DL (ref 30–150)
TSH SERPL DL<=0.005 MIU/L-ACNC: 1.51 UIU/ML (ref 0.4–4)
URATE SERPL-MCNC: 8.9 MG/DL (ref 3.4–7)

## 2020-10-14 PROCEDURE — 84550 ASSAY OF BLOOD/URIC ACID: CPT

## 2020-10-14 PROCEDURE — 84443 ASSAY THYROID STIM HORMONE: CPT

## 2020-10-14 PROCEDURE — 80061 LIPID PANEL: CPT

## 2020-10-14 PROCEDURE — 83036 HEMOGLOBIN GLYCOSYLATED A1C: CPT

## 2020-10-14 PROCEDURE — 80053 COMPREHEN METABOLIC PANEL: CPT

## 2020-10-14 PROCEDURE — 82040 ASSAY OF SERUM ALBUMIN: CPT

## 2020-10-14 PROCEDURE — 36415 COLL VENOUS BLD VENIPUNCTURE: CPT

## 2020-10-14 PROCEDURE — 86803 HEPATITIS C AB TEST: CPT

## 2020-10-15 LAB
ESTIMATED AVG GLUCOSE: 120 MG/DL (ref 68–131)
HBA1C MFR BLD HPLC: 5.8 % (ref 4–5.6)
HCV AB SERPL QL IA: NEGATIVE

## 2020-10-16 ENCOUNTER — OFFICE VISIT (OUTPATIENT)
Dept: CARDIOLOGY | Facility: CLINIC | Age: 65
End: 2020-10-16
Payer: MEDICARE

## 2020-10-16 VITALS
SYSTOLIC BLOOD PRESSURE: 129 MMHG | HEIGHT: 71 IN | BODY MASS INDEX: 37.38 KG/M2 | WEIGHT: 267 LBS | HEART RATE: 57 BPM | DIASTOLIC BLOOD PRESSURE: 78 MMHG

## 2020-10-16 DIAGNOSIS — R06.02 SOB (SHORTNESS OF BREATH) ON EXERTION: ICD-10-CM

## 2020-10-16 DIAGNOSIS — I45.10 RBBB (RIGHT BUNDLE BRANCH BLOCK): ICD-10-CM

## 2020-10-16 DIAGNOSIS — E11.9 DIABETES MELLITUS WITHOUT COMPLICATION: ICD-10-CM

## 2020-10-16 DIAGNOSIS — I10 ESSENTIAL HYPERTENSION: ICD-10-CM

## 2020-10-16 DIAGNOSIS — R94.31 NONSPECIFIC ABNORMAL ELECTROCARDIOGRAM (ECG) (EKG): ICD-10-CM

## 2020-10-16 DIAGNOSIS — R61 DIAPHORESIS: ICD-10-CM

## 2020-10-16 DIAGNOSIS — R07.89 ATYPICAL CHEST PAIN: ICD-10-CM

## 2020-10-16 PROCEDURE — 3044F HG A1C LEVEL LT 7.0%: CPT | Mod: CPTII,S$GLB,, | Performed by: INTERNAL MEDICINE

## 2020-10-16 PROCEDURE — 3008F BODY MASS INDEX DOCD: CPT | Mod: CPTII,S$GLB,, | Performed by: INTERNAL MEDICINE

## 2020-10-16 PROCEDURE — 99999 PR PBB SHADOW E&M-EST. PATIENT-LVL IV: CPT | Mod: PBBFAC,,, | Performed by: INTERNAL MEDICINE

## 2020-10-16 PROCEDURE — 1101F PT FALLS ASSESS-DOCD LE1/YR: CPT | Mod: CPTII,S$GLB,, | Performed by: INTERNAL MEDICINE

## 2020-10-16 PROCEDURE — 1159F MED LIST DOCD IN RCRD: CPT | Mod: S$GLB,,, | Performed by: INTERNAL MEDICINE

## 2020-10-16 PROCEDURE — 99204 PR OFFICE/OUTPT VISIT, NEW, LEVL IV, 45-59 MIN: ICD-10-PCS | Mod: S$GLB,,, | Performed by: INTERNAL MEDICINE

## 2020-10-16 PROCEDURE — 93000 EKG 12-LEAD: ICD-10-PCS | Mod: S$GLB,,, | Performed by: INTERNAL MEDICINE

## 2020-10-16 PROCEDURE — 1159F PR MEDICATION LIST DOCUMENTED IN MEDICAL RECORD: ICD-10-PCS | Mod: S$GLB,,, | Performed by: INTERNAL MEDICINE

## 2020-10-16 PROCEDURE — 3044F PR MOST RECENT HEMOGLOBIN A1C LEVEL <7.0%: ICD-10-PCS | Mod: CPTII,S$GLB,, | Performed by: INTERNAL MEDICINE

## 2020-10-16 PROCEDURE — 99204 OFFICE O/P NEW MOD 45 MIN: CPT | Mod: S$GLB,,, | Performed by: INTERNAL MEDICINE

## 2020-10-16 PROCEDURE — 93000 ELECTROCARDIOGRAM COMPLETE: CPT | Mod: S$GLB,,, | Performed by: INTERNAL MEDICINE

## 2020-10-16 PROCEDURE — 3008F PR BODY MASS INDEX (BMI) DOCUMENTED: ICD-10-PCS | Mod: CPTII,S$GLB,, | Performed by: INTERNAL MEDICINE

## 2020-10-16 PROCEDURE — 1101F PR PT FALLS ASSESS DOC 0-1 FALLS W/OUT INJ PAST YR: ICD-10-PCS | Mod: CPTII,S$GLB,, | Performed by: INTERNAL MEDICINE

## 2020-10-16 PROCEDURE — 99999 PR PBB SHADOW E&M-EST. PATIENT-LVL IV: ICD-10-PCS | Mod: PBBFAC,,, | Performed by: INTERNAL MEDICINE

## 2020-10-16 RX ORDER — INDOMETHACIN 50 MG/1
50 CAPSULE ORAL 3 TIMES DAILY PRN
COMMUNITY
End: 2020-11-16 | Stop reason: SDUPTHER

## 2020-10-16 NOTE — LETTER
October 16, 2020      Naren Guy III, ISABEL  [address] [facility] [address]           [facility]  [address] [place] [address]  Phone: [phone]          Patient: Asim Wilson   MR Number: [id number]   YOB: 1955   Date of Visit: 10/16/2020       Dear Naren Guy III:    Thank you for referring Asim Wilson to me for evaluation. Attached you will find relevant portions of my assessment and plan of care.    If you have questions, please do not hesitate to call me. I look forward to following Asim Wilson along with you.    Sincerely,    Naveed Edwards MD    Enclosure  CC:  No Recipients    If you would like to receive this communication electronically, please contact [email or web address] or [phone] to request more information on [employer] Link access.    For providers and/or their staff who would like to refer a patient to [facility], please contact us through our one-stop-shop provider referral line, Charito Cristobal, at [phone].    If you feel you have received this communication in error or would no longer like to receive these types of communications, please e-mail [email or web address]

## 2020-10-16 NOTE — PROGRESS NOTES
"Subjective:    Patient ID:  Asim Wilson is a 65 y.o. male who presents for evaluation of Chest Pain      Pt has been having episodes of sudden cold sweats, SOB L arm pain and chest pain. Also generalized fatigue and "just don't feel right". Symptoms do not seem to be exertionally related but he is not terribly active physically.       Review of Systems   Constitution: Positive for diaphoresis, malaise/fatigue and weight gain. Negative for weight loss.   HENT: Negative.    Eyes: Negative.    Cardiovascular: Positive for chest pain. Negative for claudication, cyanosis, dyspnea on exertion, irregular heartbeat, leg swelling, near-syncope, orthopnea (no PND) and palpitations.   Respiratory: Positive for shortness of breath. Negative for cough, hemoptysis and snoring.    Endocrine: Negative.    Skin: Negative.    Musculoskeletal: Negative for joint pain, muscle cramps, muscle weakness and myalgias.   Gastrointestinal: Negative for diarrhea, hematemesis, nausea and vomiting.   Genitourinary: Negative.    Neurological: Negative for dizziness, focal weakness, light-headedness, loss of balance, numbness, paresthesias and seizures.   Psychiatric/Behavioral: Negative.         Objective:    Physical Exam   Constitutional: He is oriented to person, place, and time. He appears well-developed and well-nourished.   HENT:   Mouth/Throat: Oropharynx is clear and moist.   Eyes: Pupils are equal, round, and reactive to light.   Neck: Normal range of motion. No thyromegaly present.   Cardiovascular: Normal rate, regular rhythm, S1 normal, S2 normal, normal heart sounds, intact distal pulses and normal pulses.  No extrasystoles are present. PMI is not displaced. Exam reveals no friction rub.   No murmur heard.  Pulmonary/Chest: Effort normal and breath sounds normal. He has no wheezes. He has no rales. He exhibits no tenderness.   Abdominal: Soft. Bowel sounds are normal. He exhibits no distension and no mass. There is no abdominal " tenderness.   Musculoskeletal: Normal range of motion.         General: No edema.   Neurological: He is alert and oriented to person, place, and time.   Skin: Skin is warm and dry.   Vitals reviewed.      Test(s) Reviewed  I have reviewed the following in detail:  [] Stress test   [] Angiography   [] Echocardiogram   [x] Labs   [x] Other:  ECG - NSR; RBBB; AFB       Assessment:       1. Atypical chest pain    2. Diaphoresis    3. Essential hypertension    4. Diabetes mellitus without complication    5. SOB (shortness of breath) on exertion         Plan:       We discussed his symptoms, risk factors and concerns about having heart disease  Echo  SPECT stress

## 2020-10-19 DIAGNOSIS — I10 ESSENTIAL HYPERTENSION: Primary | ICD-10-CM

## 2020-10-19 DIAGNOSIS — I45.10 RBBB (RIGHT BUNDLE BRANCH BLOCK): ICD-10-CM

## 2020-10-20 ENCOUNTER — TELEPHONE (OUTPATIENT)
Dept: CARDIOLOGY | Facility: CLINIC | Age: 65
End: 2020-10-20

## 2020-10-20 NOTE — TELEPHONE ENCOUNTER
----- Message from Jean Triana sent at 10/20/2020 10:14 AM CDT -----  Contact: Gabi  Type: Needs Medical Advice  Who Called:  patient wife Gabi  Symptoms (please be specific):    How long has patient had these symptoms:    Pharmacy name and phone #:    Best Call Back Number:  or   Additional Information: requesting a call back from the nurse regarding  EKG abnormal test results need to know what to do?

## 2020-10-20 NOTE — TELEPHONE ENCOUNTER
Spoke to wife and advised Dr Edwards has reviewed EKG and there is no action that needs to be done at this time; she expressed understanding

## 2020-10-22 LAB
ALBUMIN SERPL-MCNC: 3.9 G/DL (ref 3.6–5.1)
SHBG SERPL-SCNC: 29 NMOL/L (ref 22–77)
TESTOST FREE SERPL-MCNC: 41.7 PG/ML (ref 46–224)
TESTOST SERPL-MCNC: 286 NG/DL (ref 250–1100)
TESTOSTERONE.FREE+WB SERPL-MCNC: 74.9 NG/DL (ref 110–575)

## 2020-11-02 ENCOUNTER — HOSPITAL ENCOUNTER (OUTPATIENT)
Dept: RADIOLOGY | Facility: HOSPITAL | Age: 65
Discharge: HOME | End: 2020-11-02
Attending: PHYSICIAN ASSISTANT
Payer: MEDICARE

## 2020-11-02 DIAGNOSIS — Z13.6 SCREENING FOR ABDOMINAL AORTIC ANEURYSM: ICD-10-CM

## 2020-11-02 PROCEDURE — 76775 US ABDOMINAL AORTA: ICD-10-PCS | Mod: 26,,, | Performed by: RADIOLOGY

## 2020-11-02 PROCEDURE — 76775 US EXAM ABDO BACK WALL LIM: CPT | Mod: TC,PO

## 2020-11-02 PROCEDURE — 76775 US EXAM ABDO BACK WALL LIM: CPT | Mod: 26,,, | Performed by: RADIOLOGY

## 2020-11-16 DIAGNOSIS — E03.9 HYPOTHYROIDISM, UNSPECIFIED TYPE: Primary | ICD-10-CM

## 2020-11-16 RX ORDER — LISINOPRIL AND HYDROCHLOROTHIAZIDE 20; 25 MG/1; MG/1
1 TABLET ORAL DAILY
Qty: 90 TABLET | Refills: 3 | Status: SHIPPED | OUTPATIENT
Start: 2020-11-16 | End: 2022-02-08

## 2020-11-16 RX ORDER — MONTELUKAST SODIUM 10 MG/1
10 TABLET ORAL DAILY
Qty: 90 TABLET | Refills: 3 | Status: SHIPPED | OUTPATIENT
Start: 2020-11-16 | End: 2021-11-05

## 2020-11-16 RX ORDER — LEVOTHYROXINE SODIUM 137 UG/1
137 TABLET ORAL
COMMUNITY
End: 2020-11-16 | Stop reason: SDUPTHER

## 2020-11-16 RX ORDER — INDOMETHACIN 50 MG/1
50 CAPSULE ORAL 3 TIMES DAILY PRN
Qty: 30 CAPSULE | Refills: 3 | Status: SHIPPED | OUTPATIENT
Start: 2020-11-16 | End: 2023-01-03 | Stop reason: SDUPTHER

## 2020-11-16 RX ORDER — METFORMIN HYDROCHLORIDE 500 MG/1
500 TABLET, EXTENDED RELEASE ORAL DAILY
Qty: 90 TABLET | Refills: 3 | Status: SHIPPED | OUTPATIENT
Start: 2020-11-16 | End: 2021-09-16

## 2020-11-16 RX ORDER — ALLOPURINOL 300 MG/1
300 TABLET ORAL DAILY PRN
Qty: 90 TABLET | Refills: 3 | Status: SHIPPED | OUTPATIENT
Start: 2020-11-16 | End: 2021-11-05

## 2020-11-16 RX ORDER — LEVOTHYROXINE SODIUM 137 UG/1
137 TABLET ORAL
Qty: 90 TABLET | Refills: 3 | Status: SHIPPED | OUTPATIENT
Start: 2020-11-16 | End: 2020-11-16 | Stop reason: SDUPTHER

## 2020-11-16 RX ORDER — LEVOTHYROXINE SODIUM 137 UG/1
137 TABLET ORAL
Qty: 90 TABLET | Refills: 3 | Status: SHIPPED | OUTPATIENT
Start: 2020-11-16 | End: 2021-11-05

## 2020-11-18 ENCOUNTER — HOSPITAL ENCOUNTER (OUTPATIENT)
Dept: RADIOLOGY | Facility: HOSPITAL | Age: 65
Discharge: HOME | End: 2020-11-18
Attending: INTERNAL MEDICINE
Payer: MEDICARE

## 2020-11-18 ENCOUNTER — CLINICAL SUPPORT (OUTPATIENT)
Dept: CARDIOLOGY | Facility: CLINIC | Age: 65
End: 2020-11-18
Attending: INTERNAL MEDICINE
Payer: MEDICARE

## 2020-11-18 VITALS
WEIGHT: 266 LBS | BODY MASS INDEX: 36.03 KG/M2 | HEIGHT: 71 IN | WEIGHT: 266 LBS | HEIGHT: 72 IN | BODY MASS INDEX: 37.24 KG/M2

## 2020-11-18 DIAGNOSIS — I10 ESSENTIAL HYPERTENSION: ICD-10-CM

## 2020-11-18 DIAGNOSIS — R07.89 ATYPICAL CHEST PAIN: ICD-10-CM

## 2020-11-18 DIAGNOSIS — E11.9 DIABETES MELLITUS WITHOUT COMPLICATION: ICD-10-CM

## 2020-11-18 DIAGNOSIS — R61 DIAPHORESIS: ICD-10-CM

## 2020-11-18 DIAGNOSIS — R06.02 SOB (SHORTNESS OF BREATH) ON EXERTION: ICD-10-CM

## 2020-11-18 PROCEDURE — 78452 STRESS TEST WITH MYOCARDIAL PERFUSION (CUPID ONLY): ICD-10-PCS | Mod: 26,,, | Performed by: INTERNAL MEDICINE

## 2020-11-18 PROCEDURE — 78452 HT MUSCLE IMAGE SPECT MULT: CPT | Mod: 26,,, | Performed by: INTERNAL MEDICINE

## 2020-11-18 PROCEDURE — A9502 TC99M TETROFOSMIN: HCPCS | Mod: PO

## 2020-11-18 PROCEDURE — 93018 PR CARDIAC STRESS TST,INTERP/REPT ONLY: ICD-10-PCS | Mod: ,,, | Performed by: INTERNAL MEDICINE

## 2020-11-18 PROCEDURE — 93016 STRESS TEST WITH MYOCARDIAL PERFUSION (CUPID ONLY): ICD-10-PCS | Mod: ,,, | Performed by: INTERNAL MEDICINE

## 2020-11-18 PROCEDURE — 99999 PR PBB SHADOW E&M-EST. PATIENT-LVL I: ICD-10-PCS | Mod: PBBFAC,,,

## 2020-11-18 PROCEDURE — 93016 CV STRESS TEST SUPVJ ONLY: CPT | Mod: ,,, | Performed by: INTERNAL MEDICINE

## 2020-11-18 PROCEDURE — 93306 TTE W/DOPPLER COMPLETE: CPT | Mod: S$GLB,,, | Performed by: INTERNAL MEDICINE

## 2020-11-18 PROCEDURE — 99999 PR PBB SHADOW E&M-EST. PATIENT-LVL I: CPT | Mod: PBBFAC,,,

## 2020-11-18 PROCEDURE — 93306 ECHO (CUPID ONLY): ICD-10-PCS | Mod: S$GLB,,, | Performed by: INTERNAL MEDICINE

## 2020-11-18 PROCEDURE — 93018 CV STRESS TEST I&R ONLY: CPT | Mod: ,,, | Performed by: INTERNAL MEDICINE

## 2020-11-18 PROCEDURE — 93017 CV STRESS TEST TRACING ONLY: CPT

## 2020-11-20 LAB
ASCENDING AORTA: 3.01 CM
AV INDEX (PROSTH): 0.89
AV MEAN GRADIENT: 4 MMHG
AV PEAK GRADIENT: 7 MMHG
AV VALVE AREA: 3.74 CM2
AV VELOCITY RATIO: 0.86
BSA FOR ECHO PROCEDURE: 2.48 M2
CV ECHO LV RWT: 0.46 CM
CV STRESS BASE HR: 65 BPM
DIASTOLIC BLOOD PRESSURE: 72 MMHG
DOP CALC AO PEAK VEL: 1.33 M/S
DOP CALC AO VTI: 29.79 CM
DOP CALC LVOT AREA: 4.2 CM2
DOP CALC LVOT DIAMETER: 2.32 CM
DOP CALC LVOT PEAK VEL: 1.14 M/S
DOP CALC LVOT STROKE VOLUME: 111.5 CM3
DOP CALCLVOT PEAK VEL VTI: 26.39 CM
E WAVE DECELERATION TIME: 289.99 MSEC
E/A RATIO: 0.84
E/E' RATIO: 13.54 M/S
ECHO LV POSTERIOR WALL: 1.07 CM (ref 0.6–1.1)
FRACTIONAL SHORTENING: 31 % (ref 28–44)
INTERVENTRICULAR SEPTUM: 1.08 CM (ref 0.6–1.1)
IVRT: 97.05 MSEC
LA MAJOR: 4.11 CM
LA MINOR: 4.23 CM
LA WIDTH: 3.27 CM
LEFT ATRIUM SIZE: 3.61 CM
LEFT ATRIUM VOLUME INDEX: 17.4 ML/M2
LEFT ATRIUM VOLUME: 41.83 CM3
LEFT INTERNAL DIMENSION IN SYSTOLE: 3.19 CM (ref 2.1–4)
LEFT VENTRICLE DIASTOLIC VOLUME INDEX: 41.09 ML/M2
LEFT VENTRICLE DIASTOLIC VOLUME: 98.82 ML
LEFT VENTRICLE MASS INDEX: 74 G/M2
LEFT VENTRICLE SYSTOLIC VOLUME INDEX: 16.8 ML/M2
LEFT VENTRICLE SYSTOLIC VOLUME: 40.52 ML
LEFT VENTRICULAR INTERNAL DIMENSION IN DIASTOLE: 4.63 CM (ref 3.5–6)
LEFT VENTRICULAR MASS: 177.33 G
LV LATERAL E/E' RATIO: 11 M/S
LV SEPTAL E/E' RATIO: 17.6 M/S
MV PEAK A VEL: 1.05 M/S
MV PEAK E VEL: 0.88 M/S
NUC REST DIASTOLIC VOLUME INDEX: 89
NUC REST EJECTION FRACTION: 68
NUC REST SYSTOLIC VOLUME INDEX: 28
OHS CV CPX 1 MINUTE RECOVERY HEART RATE: 106 BPM
OHS CV CPX 85 PERCENT MAX PREDICTED HEART RATE MALE: 130
OHS CV CPX ESTIMATED METS: 8
OHS CV CPX MAX PREDICTED HEART RATE: 153
OHS CV CPX PATIENT IS FEMALE: 0
OHS CV CPX PATIENT IS MALE: 1
OHS CV CPX PEAK DIASTOLIC BLOOD PRESSURE: 64 MMHG
OHS CV CPX PEAK HEAR RATE: 148 BPM
OHS CV CPX PEAK RATE PRESSURE PRODUCT: NORMAL
OHS CV CPX PEAK SYSTOLIC BLOOD PRESSURE: 167 MMHG
OHS CV CPX PERCENT MAX PREDICTED HEART RATE ACHIEVED: 97
OHS CV CPX RATE PRESSURE PRODUCT PRESENTING: 7410
PISA TR MAX VEL: 1.86 M/S
PULM VEIN S/D RATIO: 1.57
PV PEAK D VEL: 0.44 M/S
PV PEAK S VEL: 0.69 M/S
RA MAJOR: 3.35 CM
RA PRESSURE: 3 MMHG
RA WIDTH: 2.45 CM
RIGHT VENTRICULAR END-DIASTOLIC DIMENSION: 2.32 CM
SINUS: 3.03 CM
STJ: 2.53 CM
STRESS ECHO POST EXERCISE DUR MIN: 5 MINUTES
STRESS ECHO POST EXERCISE DUR SEC: 1 SECONDS
SYSTOLIC BLOOD PRESSURE: 114 MMHG
TDI LATERAL: 0.08 M/S
TDI SEPTAL: 0.05 M/S
TDI: 0.07 M/S
TR MAX PG: 14 MMHG
TRICUSPID ANNULAR PLANE SYSTOLIC EXCURSION: 1.99 CM
TV REST PULMONARY ARTERY PRESSURE: 17 MMHG

## 2020-11-23 ENCOUNTER — TELEPHONE (OUTPATIENT)
Dept: CARDIOLOGY | Facility: CLINIC | Age: 65
End: 2020-11-23

## 2020-11-24 ENCOUNTER — TELEPHONE (OUTPATIENT)
Dept: CARDIOLOGY | Facility: CLINIC | Age: 65
End: 2020-11-24

## 2020-11-24 ENCOUNTER — PATIENT MESSAGE (OUTPATIENT)
Dept: CARDIOLOGY | Facility: CLINIC | Age: 65
End: 2020-11-24

## 2020-11-24 NOTE — TELEPHONE ENCOUNTER
----- Message from Jean Triana sent at 11/24/2020  3:05 PM CST -----  Contact: patient  Type: Needs Medical Advice  Who Called:  patient  Symptoms (please be specific):    How long has patient had these symptoms:    Pharmacy name and phone #:    Best Call Back Number: [phone] or   Additional Information: requesting a call back to discuss test results

## 2020-12-02 ENCOUNTER — TELEPHONE (OUTPATIENT)
Dept: GASTROENTEROLOGY | Facility: CLINIC | Age: 65
End: 2020-12-02

## 2020-12-02 NOTE — TELEPHONE ENCOUNTER
Pt's wife states that she does not wish to schedule at this time. He is having some cardiac issues and she would like to wait until all of those resolve before having a surgical procedure. Phone number provided for call back when she is ready to schedule. Phone number provided. Order canceled, PCP notified.

## 2020-12-03 ENCOUNTER — OFFICE VISIT (OUTPATIENT)
Dept: CARDIOLOGY | Facility: CLINIC | Age: 65
End: 2020-12-03
Payer: MEDICARE

## 2020-12-03 VITALS
DIASTOLIC BLOOD PRESSURE: 72 MMHG | WEIGHT: 268.06 LBS | HEART RATE: 62 BPM | BODY MASS INDEX: 36.36 KG/M2 | SYSTOLIC BLOOD PRESSURE: 108 MMHG

## 2020-12-03 DIAGNOSIS — I10 ESSENTIAL HYPERTENSION: ICD-10-CM

## 2020-12-03 DIAGNOSIS — I45.10 RBBB (RIGHT BUNDLE BRANCH BLOCK): ICD-10-CM

## 2020-12-03 DIAGNOSIS — I20.9 ANGINA PECTORIS, UNSPECIFIED: ICD-10-CM

## 2020-12-03 DIAGNOSIS — R94.39 ABNORMAL NUCLEAR STRESS TEST: Primary | ICD-10-CM

## 2020-12-03 DIAGNOSIS — Z03.818 ENCOUNTER FOR OBSERVATION FOR SUSPECTED EXPOSURE TO OTHER BIOLOGICAL AGENTS RULED OUT: ICD-10-CM

## 2020-12-03 DIAGNOSIS — E78.5 HYPERLIPIDEMIA, UNSPECIFIED HYPERLIPIDEMIA TYPE: ICD-10-CM

## 2020-12-03 PROCEDURE — 1101F PR PT FALLS ASSESS DOC 0-1 FALLS W/OUT INJ PAST YR: ICD-10-PCS | Mod: CPTII,S$GLB,, | Performed by: INTERNAL MEDICINE

## 2020-12-03 PROCEDURE — 1159F MED LIST DOCD IN RCRD: CPT | Mod: S$GLB,,, | Performed by: INTERNAL MEDICINE

## 2020-12-03 PROCEDURE — 3288F PR FALLS RISK ASSESSMENT DOCUMENTED: ICD-10-PCS | Mod: CPTII,S$GLB,, | Performed by: INTERNAL MEDICINE

## 2020-12-03 PROCEDURE — 3008F BODY MASS INDEX DOCD: CPT | Mod: CPTII,S$GLB,, | Performed by: INTERNAL MEDICINE

## 2020-12-03 PROCEDURE — 1126F PR PAIN SEVERITY QUANTIFIED, NO PAIN PRESENT: ICD-10-PCS | Mod: S$GLB,,, | Performed by: INTERNAL MEDICINE

## 2020-12-03 PROCEDURE — 99214 OFFICE O/P EST MOD 30 MIN: CPT | Mod: S$GLB,,, | Performed by: INTERNAL MEDICINE

## 2020-12-03 PROCEDURE — 3008F PR BODY MASS INDEX (BMI) DOCUMENTED: ICD-10-PCS | Mod: CPTII,S$GLB,, | Performed by: INTERNAL MEDICINE

## 2020-12-03 PROCEDURE — 99999 PR PBB SHADOW E&M-EST. PATIENT-LVL III: ICD-10-PCS | Mod: PBBFAC,,, | Performed by: INTERNAL MEDICINE

## 2020-12-03 PROCEDURE — 99214 PR OFFICE/OUTPT VISIT, EST, LEVL IV, 30-39 MIN: ICD-10-PCS | Mod: S$GLB,,, | Performed by: INTERNAL MEDICINE

## 2020-12-03 PROCEDURE — 99999 PR PBB SHADOW E&M-EST. PATIENT-LVL III: CPT | Mod: PBBFAC,,, | Performed by: INTERNAL MEDICINE

## 2020-12-03 PROCEDURE — 3288F FALL RISK ASSESSMENT DOCD: CPT | Mod: CPTII,S$GLB,, | Performed by: INTERNAL MEDICINE

## 2020-12-03 PROCEDURE — 1101F PT FALLS ASSESS-DOCD LE1/YR: CPT | Mod: CPTII,S$GLB,, | Performed by: INTERNAL MEDICINE

## 2020-12-03 PROCEDURE — 1126F AMNT PAIN NOTED NONE PRSNT: CPT | Mod: S$GLB,,, | Performed by: INTERNAL MEDICINE

## 2020-12-03 PROCEDURE — 1159F PR MEDICATION LIST DOCUMENTED IN MEDICAL RECORD: ICD-10-PCS | Mod: S$GLB,,, | Performed by: INTERNAL MEDICINE

## 2020-12-03 NOTE — PATIENT INSTRUCTIONS
Angiogram    Arrive for procedure at: [facility] Dec 10 at 9am ( hospital will call with arrival time)    Covid Test 12/7/20    You will receive a phone call from [facility] Pre-Op Department with further instructions prior to your scheduled procedure.    Notify the nurse if you are ALLERGIC TO IODINE.    FASTING: You MAY NOT have anything to eat or drink AFTER MIDNIGHT the day before your procedure. If your procedure is scheduled in the afternoon, you may have a LIGHT BREAKFAST 6-8 hours prior to your procedure.  For example: Two slices of toast; black coffee or black tea.    MEDICATIONS: You may take your regular morning medications with water. If there are any medications that you should not take, you will be instructed to hold them for that morning.    ? CARDIOLOGY PRE-PROCEDURE MEDICATION ORDERS:  ** Please hold any medications that are checked below:    HOLD   # OF DAYS TO HOLD  ? Coumadin   Consult with Coumadin Clinic   ? Xarelto    _DAY BEFORE & DAY OF_  ? Pradaxa  _ DAY BEFORE & DAY OF _  ? Eliquis   _ DAY BEFORE & DAY OF _  ? Metformin    Day before procedure & morning of procedure  ? Short acting insulin   Morning of procedure    CONTINUE the Following Medications   ? Plavix      ? Effient     ? Aspirin    WHAT TO EXPECT:    How long will the procedure take?  The procedure will take an average of 1 - 2 hours to perform.  After the procedure, you will need to lay flat for around 4 - 6 hours to minimize bleeding from the puncture site. If the wrist is accessed you will need to keep your arm still as instructed by the nurse.    When can I go home?  You may be able to be discharged home that same afternoon if there were no complications.  If you have one of the following: balloon; stent; pacemaker or defibrillator procedures, you may spend one night for observation.  Your doctor will determine your discharge based upon your progress.  The results of your procedure will be discussed with you  before you are discharged.  Any further testing or procedures will be scheduled for you either before you leave or you will be instructed to call for a future appointment.      TRANSPORTATION:  PLEASE ARRANGE TO HAVE SOMEONE DRIVE YOU HOME FOLLOWING YOUR PROCEDURE, YOU WILL NOT BE ALLOWED TO DRIVE.

## 2020-12-03 NOTE — PROGRESS NOTES
"Subjective:    Patient ID:  Asim Wilson is a 65 y.o. male who presents for follow-up of Hypertension (discuss test results) and Chest Pain      Pt seen 6 weeks ago with episodes of sudden cold sweats, SOB L arm pain and chest pain. Also generalized fatigue and "just don't feel right". We ordered Echo and SPECT stress. Perfusion stress suggested anterior and lateral ischemia. He has had continued symptoms.       Review of Systems   Constitution: Positive for diaphoresis and malaise/fatigue. Negative for weight gain and weight loss.   HENT: Negative.    Eyes: Negative.    Cardiovascular: Positive for chest pain. Negative for claudication, cyanosis, dyspnea on exertion, irregular heartbeat, leg swelling, near-syncope, orthopnea (no PND), palpitations and syncope.   Respiratory: Positive for shortness of breath. Negative for cough, hemoptysis and snoring.    Endocrine: Negative.    Skin: Negative.    Musculoskeletal: Negative for joint pain, muscle cramps, muscle weakness and myalgias.   Gastrointestinal: Negative for diarrhea, hematemesis, nausea and vomiting.   Genitourinary: Negative.    Neurological: Negative for dizziness, focal weakness, light-headedness, loss of balance, numbness, paresthesias and seizures.   Psychiatric/Behavioral: Negative.         Objective:    Physical Exam   Constitutional: He is oriented to person, place, and time. He appears well-developed and well-nourished.   HENT:   Mouth/Throat: Oropharynx is clear and moist.   Eyes: Pupils are equal, round, and reactive to light.   Neck: Normal range of motion. No thyromegaly present.   Cardiovascular: Normal rate, regular rhythm, S1 normal, S2 normal, normal heart sounds, intact distal pulses and normal pulses.  No extrasystoles are present. PMI is not displaced. Exam reveals no friction rub.   No murmur heard.  Pulmonary/Chest: Effort normal and breath sounds normal. He has no wheezes. He has no rales. He exhibits no tenderness.   Abdominal: " Soft. Bowel sounds are normal. He exhibits no distension and no mass. There is no abdominal tenderness.   Musculoskeletal: Normal range of motion.         General: No edema.   Neurological: He is alert and oriented to person, place, and time.   Skin: Skin is warm and dry.   Vitals reviewed.      Test(s) Reviewed  I have reviewed the following in detail:  [x] Stress test   [] Angiography   [x] Echocardiogram   [] Labs   [] Other:         Assessment:       1. Abnormal nuclear stress test    2. Angina pectoris, unspecified    3. Essential hypertension    4. Hyperlipidemia, unspecified hyperlipidemia type    5. RBBB (right bundle branch block)         Plan:       We discussed his symptoms and the abnormal SPECT stress  Have recommended proceeding with angiogram to r/o obstructive CAD  Discussed risks and benefits of cath and coronary angiogram and alternatives. Pt understands, all questions answered and he agrees to proceed with cath.

## 2020-12-07 ENCOUNTER — LAB VISIT (OUTPATIENT)
Dept: FAMILY MEDICINE | Facility: CLINIC | Age: 65
End: 2020-12-07
Payer: MEDICARE

## 2020-12-07 DIAGNOSIS — Z03.818 ENCOUNTER FOR OBSERVATION FOR SUSPECTED EXPOSURE TO OTHER BIOLOGICAL AGENTS RULED OUT: ICD-10-CM

## 2020-12-07 PROCEDURE — U0003 INFECTIOUS AGENT DETECTION BY NUCLEIC ACID (DNA OR RNA); SEVERE ACUTE RESPIRATORY SYNDROME CORONAVIRUS 2 (SARS-COV-2) (CORONAVIRUS DISEASE [COVID-19]), AMPLIFIED PROBE TECHNIQUE, MAKING USE OF HIGH THROUGHPUT TECHNOLOGIES AS DESCRIBED BY CMS-2020-01-R: HCPCS

## 2020-12-08 LAB — SARS-COV-2 RNA RESP QL NAA+PROBE: NOT DETECTED

## 2020-12-09 ENCOUNTER — TELEPHONE (OUTPATIENT)
Dept: CARDIOLOGY | Facility: CLINIC | Age: 65
End: 2020-12-09

## 2021-01-12 ENCOUNTER — OFFICE VISIT (OUTPATIENT)
Dept: CARDIOLOGY | Facility: CLINIC | Age: 66
End: 2021-01-12
Payer: MEDICARE

## 2021-01-12 VITALS
SYSTOLIC BLOOD PRESSURE: 101 MMHG | WEIGHT: 272.06 LBS | HEART RATE: 63 BPM | HEIGHT: 71 IN | DIASTOLIC BLOOD PRESSURE: 67 MMHG | BODY MASS INDEX: 38.09 KG/M2

## 2021-01-12 DIAGNOSIS — I25.10 CAD IN NATIVE ARTERY: ICD-10-CM

## 2021-01-12 DIAGNOSIS — E78.5 DYSLIPIDEMIA: ICD-10-CM

## 2021-01-12 DIAGNOSIS — I10 ESSENTIAL HYPERTENSION: ICD-10-CM

## 2021-01-12 DIAGNOSIS — R73.03 PREDIABETES: ICD-10-CM

## 2021-01-12 DIAGNOSIS — E66.09 CLASS 2 OBESITY DUE TO EXCESS CALORIES WITHOUT SERIOUS COMORBIDITY WITH BODY MASS INDEX (BMI) OF 37.0 TO 37.9 IN ADULT: ICD-10-CM

## 2021-01-12 PROBLEM — E66.812 CLASS 2 OBESITY IN ADULT: Status: ACTIVE | Noted: 2021-01-12

## 2021-01-12 PROBLEM — E66.9 CLASS 2 OBESITY IN ADULT: Status: ACTIVE | Noted: 2021-01-12

## 2021-01-12 PROCEDURE — 3074F PR MOST RECENT SYSTOLIC BLOOD PRESSURE < 130 MM HG: ICD-10-PCS | Mod: CPTII,S$GLB,, | Performed by: INTERNAL MEDICINE

## 2021-01-12 PROCEDURE — 3288F PR FALLS RISK ASSESSMENT DOCUMENTED: ICD-10-PCS | Mod: CPTII,S$GLB,, | Performed by: INTERNAL MEDICINE

## 2021-01-12 PROCEDURE — 99999 PR PBB SHADOW E&M-EST. PATIENT-LVL III: ICD-10-PCS | Mod: PBBFAC,,, | Performed by: INTERNAL MEDICINE

## 2021-01-12 PROCEDURE — 1101F PR PT FALLS ASSESS DOC 0-1 FALLS W/OUT INJ PAST YR: ICD-10-PCS | Mod: CPTII,S$GLB,, | Performed by: INTERNAL MEDICINE

## 2021-01-12 PROCEDURE — 3008F PR BODY MASS INDEX (BMI) DOCUMENTED: ICD-10-PCS | Mod: CPTII,S$GLB,, | Performed by: INTERNAL MEDICINE

## 2021-01-12 PROCEDURE — 1101F PT FALLS ASSESS-DOCD LE1/YR: CPT | Mod: CPTII,S$GLB,, | Performed by: INTERNAL MEDICINE

## 2021-01-12 PROCEDURE — 1126F PR PAIN SEVERITY QUANTIFIED, NO PAIN PRESENT: ICD-10-PCS | Mod: S$GLB,,, | Performed by: INTERNAL MEDICINE

## 2021-01-12 PROCEDURE — 3008F BODY MASS INDEX DOCD: CPT | Mod: CPTII,S$GLB,, | Performed by: INTERNAL MEDICINE

## 2021-01-12 PROCEDURE — 99214 PR OFFICE/OUTPT VISIT, EST, LEVL IV, 30-39 MIN: ICD-10-PCS | Mod: S$GLB,,, | Performed by: INTERNAL MEDICINE

## 2021-01-12 PROCEDURE — 3078F DIAST BP <80 MM HG: CPT | Mod: CPTII,S$GLB,, | Performed by: INTERNAL MEDICINE

## 2021-01-12 PROCEDURE — 3078F PR MOST RECENT DIASTOLIC BLOOD PRESSURE < 80 MM HG: ICD-10-PCS | Mod: CPTII,S$GLB,, | Performed by: INTERNAL MEDICINE

## 2021-01-12 PROCEDURE — 1159F MED LIST DOCD IN RCRD: CPT | Mod: S$GLB,,, | Performed by: INTERNAL MEDICINE

## 2021-01-12 PROCEDURE — 3074F SYST BP LT 130 MM HG: CPT | Mod: CPTII,S$GLB,, | Performed by: INTERNAL MEDICINE

## 2021-01-12 PROCEDURE — 99214 OFFICE O/P EST MOD 30 MIN: CPT | Mod: S$GLB,,, | Performed by: INTERNAL MEDICINE

## 2021-01-12 PROCEDURE — 3288F FALL RISK ASSESSMENT DOCD: CPT | Mod: CPTII,S$GLB,, | Performed by: INTERNAL MEDICINE

## 2021-01-12 PROCEDURE — 1159F PR MEDICATION LIST DOCUMENTED IN MEDICAL RECORD: ICD-10-PCS | Mod: S$GLB,,, | Performed by: INTERNAL MEDICINE

## 2021-01-12 PROCEDURE — 1126F AMNT PAIN NOTED NONE PRSNT: CPT | Mod: S$GLB,,, | Performed by: INTERNAL MEDICINE

## 2021-01-12 PROCEDURE — 99999 PR PBB SHADOW E&M-EST. PATIENT-LVL III: CPT | Mod: PBBFAC,,, | Performed by: INTERNAL MEDICINE

## 2021-04-03 ENCOUNTER — OFFICE VISIT (OUTPATIENT)
Dept: FAMILY MEDICINE | Facility: CLINIC | Age: 66
End: 2021-04-03
Payer: MEDICARE

## 2021-04-03 VITALS
BODY MASS INDEX: 36.82 KG/M2 | OXYGEN SATURATION: 97 % | TEMPERATURE: 98 F | HEART RATE: 81 BPM | WEIGHT: 264 LBS | SYSTOLIC BLOOD PRESSURE: 118 MMHG | DIASTOLIC BLOOD PRESSURE: 74 MMHG | RESPIRATION RATE: 18 BRPM

## 2021-04-03 DIAGNOSIS — M79.18 PAIN IN RIGHT BUTTOCK: ICD-10-CM

## 2021-04-03 DIAGNOSIS — I20.9 ANGINA PECTORIS, UNSPECIFIED: ICD-10-CM

## 2021-04-03 DIAGNOSIS — K61.1 RECTAL ABSCESS: Primary | ICD-10-CM

## 2021-04-03 PROBLEM — E11.9 DIABETES MELLITUS WITHOUT COMPLICATION: Status: ACTIVE | Noted: 2021-04-03

## 2021-04-03 PROCEDURE — 1159F PR MEDICATION LIST DOCUMENTED IN MEDICAL RECORD: ICD-10-PCS | Mod: S$GLB,,, | Performed by: NURSE PRACTITIONER

## 2021-04-03 PROCEDURE — 3074F SYST BP LT 130 MM HG: CPT | Mod: CPTII,S$GLB,, | Performed by: NURSE PRACTITIONER

## 2021-04-03 PROCEDURE — 3288F FALL RISK ASSESSMENT DOCD: CPT | Mod: CPTII,S$GLB,, | Performed by: NURSE PRACTITIONER

## 2021-04-03 PROCEDURE — 1125F AMNT PAIN NOTED PAIN PRSNT: CPT | Mod: S$GLB,,, | Performed by: NURSE PRACTITIONER

## 2021-04-03 PROCEDURE — 3074F PR MOST RECENT SYSTOLIC BLOOD PRESSURE < 130 MM HG: ICD-10-PCS | Mod: CPTII,S$GLB,, | Performed by: NURSE PRACTITIONER

## 2021-04-03 PROCEDURE — 3078F PR MOST RECENT DIASTOLIC BLOOD PRESSURE < 80 MM HG: ICD-10-PCS | Mod: CPTII,S$GLB,, | Performed by: NURSE PRACTITIONER

## 2021-04-03 PROCEDURE — 1101F PT FALLS ASSESS-DOCD LE1/YR: CPT | Mod: CPTII,S$GLB,, | Performed by: NURSE PRACTITIONER

## 2021-04-03 PROCEDURE — 3008F BODY MASS INDEX DOCD: CPT | Mod: CPTII,S$GLB,, | Performed by: NURSE PRACTITIONER

## 2021-04-03 PROCEDURE — 1101F PR PT FALLS ASSESS DOC 0-1 FALLS W/OUT INJ PAST YR: ICD-10-PCS | Mod: CPTII,S$GLB,, | Performed by: NURSE PRACTITIONER

## 2021-04-03 PROCEDURE — 3008F PR BODY MASS INDEX (BMI) DOCUMENTED: ICD-10-PCS | Mod: CPTII,S$GLB,, | Performed by: NURSE PRACTITIONER

## 2021-04-03 PROCEDURE — 1159F MED LIST DOCD IN RCRD: CPT | Mod: S$GLB,,, | Performed by: NURSE PRACTITIONER

## 2021-04-03 PROCEDURE — 1125F PR PAIN SEVERITY QUANTIFIED, PAIN PRESENT: ICD-10-PCS | Mod: S$GLB,,, | Performed by: NURSE PRACTITIONER

## 2021-04-03 PROCEDURE — 3078F DIAST BP <80 MM HG: CPT | Mod: CPTII,S$GLB,, | Performed by: NURSE PRACTITIONER

## 2021-04-03 PROCEDURE — 3288F PR FALLS RISK ASSESSMENT DOCUMENTED: ICD-10-PCS | Mod: CPTII,S$GLB,, | Performed by: NURSE PRACTITIONER

## 2021-04-03 PROCEDURE — 99214 PR OFFICE/OUTPT VISIT, EST, LEVL IV, 30-39 MIN: ICD-10-PCS | Mod: S$GLB,,, | Performed by: NURSE PRACTITIONER

## 2021-04-03 PROCEDURE — 99214 OFFICE O/P EST MOD 30 MIN: CPT | Mod: S$GLB,,, | Performed by: NURSE PRACTITIONER

## 2021-04-03 RX ORDER — DOXYCYCLINE 100 MG/1
100 CAPSULE ORAL 2 TIMES DAILY
Qty: 20 CAPSULE | Refills: 0 | Status: SHIPPED | OUTPATIENT
Start: 2021-04-03 | End: 2021-04-13

## 2021-04-08 ENCOUNTER — TELEPHONE (OUTPATIENT)
Dept: SURGERY | Facility: CLINIC | Age: 66
End: 2021-04-08

## 2021-06-17 ENCOUNTER — OFFICE VISIT (OUTPATIENT)
Dept: FAMILY MEDICINE | Facility: CLINIC | Age: 66
End: 2021-06-17
Payer: MEDICARE

## 2021-06-17 VITALS
HEIGHT: 71 IN | TEMPERATURE: 98 F | BODY MASS INDEX: 36.64 KG/M2 | WEIGHT: 261.69 LBS | SYSTOLIC BLOOD PRESSURE: 118 MMHG | OXYGEN SATURATION: 96 % | DIASTOLIC BLOOD PRESSURE: 68 MMHG | HEART RATE: 74 BPM

## 2021-06-17 DIAGNOSIS — K21.9 GASTROESOPHAGEAL REFLUX DISEASE, UNSPECIFIED WHETHER ESOPHAGITIS PRESENT: Primary | ICD-10-CM

## 2021-06-17 PROCEDURE — 1101F PR PT FALLS ASSESS DOC 0-1 FALLS W/OUT INJ PAST YR: ICD-10-PCS | Mod: CPTII,S$GLB,, | Performed by: PHYSICIAN ASSISTANT

## 2021-06-17 PROCEDURE — 99214 OFFICE O/P EST MOD 30 MIN: CPT | Mod: S$GLB,,, | Performed by: PHYSICIAN ASSISTANT

## 2021-06-17 PROCEDURE — 3288F PR FALLS RISK ASSESSMENT DOCUMENTED: ICD-10-PCS | Mod: CPTII,S$GLB,, | Performed by: PHYSICIAN ASSISTANT

## 2021-06-17 PROCEDURE — 1101F PT FALLS ASSESS-DOCD LE1/YR: CPT | Mod: CPTII,S$GLB,, | Performed by: PHYSICIAN ASSISTANT

## 2021-06-17 PROCEDURE — 3008F BODY MASS INDEX DOCD: CPT | Mod: CPTII,S$GLB,, | Performed by: PHYSICIAN ASSISTANT

## 2021-06-17 PROCEDURE — 1126F AMNT PAIN NOTED NONE PRSNT: CPT | Mod: S$GLB,,, | Performed by: PHYSICIAN ASSISTANT

## 2021-06-17 PROCEDURE — 3008F PR BODY MASS INDEX (BMI) DOCUMENTED: ICD-10-PCS | Mod: CPTII,S$GLB,, | Performed by: PHYSICIAN ASSISTANT

## 2021-06-17 PROCEDURE — 1159F MED LIST DOCD IN RCRD: CPT | Mod: S$GLB,,, | Performed by: PHYSICIAN ASSISTANT

## 2021-06-17 PROCEDURE — 99214 PR OFFICE/OUTPT VISIT, EST, LEVL IV, 30-39 MIN: ICD-10-PCS | Mod: S$GLB,,, | Performed by: PHYSICIAN ASSISTANT

## 2021-06-17 PROCEDURE — 1126F PR PAIN SEVERITY QUANTIFIED, NO PAIN PRESENT: ICD-10-PCS | Mod: S$GLB,,, | Performed by: PHYSICIAN ASSISTANT

## 2021-06-17 PROCEDURE — 1159F PR MEDICATION LIST DOCUMENTED IN MEDICAL RECORD: ICD-10-PCS | Mod: S$GLB,,, | Performed by: PHYSICIAN ASSISTANT

## 2021-06-17 PROCEDURE — 3288F FALL RISK ASSESSMENT DOCD: CPT | Mod: CPTII,S$GLB,, | Performed by: PHYSICIAN ASSISTANT

## 2021-06-17 RX ORDER — OMEPRAZOLE 40 MG/1
40 CAPSULE, DELAYED RELEASE ORAL DAILY
Qty: 30 CAPSULE | Refills: 0 | Status: SHIPPED | OUTPATIENT
Start: 2021-06-17 | End: 2023-09-11

## 2021-06-18 ENCOUNTER — OFFICE VISIT (OUTPATIENT)
Dept: FAMILY MEDICINE | Facility: CLINIC | Age: 66
End: 2021-06-18
Payer: MEDICARE

## 2021-06-18 ENCOUNTER — TELEPHONE (OUTPATIENT)
Dept: FAMILY MEDICINE | Facility: CLINIC | Age: 66
End: 2021-06-18

## 2021-06-18 VITALS
BODY MASS INDEX: 36.62 KG/M2 | DIASTOLIC BLOOD PRESSURE: 74 MMHG | TEMPERATURE: 98 F | SYSTOLIC BLOOD PRESSURE: 112 MMHG | WEIGHT: 262.56 LBS | HEART RATE: 72 BPM

## 2021-06-18 DIAGNOSIS — E79.0 HYPERURICEMIA: ICD-10-CM

## 2021-06-18 DIAGNOSIS — E11.69 TYPE 2 DIABETES MELLITUS WITH OTHER SPECIFIED COMPLICATION, WITHOUT LONG-TERM CURRENT USE OF INSULIN: ICD-10-CM

## 2021-06-18 DIAGNOSIS — J02.9 ACUTE SORE THROAT: Primary | ICD-10-CM

## 2021-06-18 DIAGNOSIS — Z12.5 SCREENING FOR MALIGNANT NEOPLASM OF PROSTATE: ICD-10-CM

## 2021-06-18 DIAGNOSIS — E03.9 HYPOTHYROIDISM, UNSPECIFIED TYPE: ICD-10-CM

## 2021-06-18 DIAGNOSIS — Z12.11 SCREENING FOR COLON CANCER: ICD-10-CM

## 2021-06-18 PROBLEM — E11.9 TYPE 2 DIABETES MELLITUS, WITHOUT LONG-TERM CURRENT USE OF INSULIN: Status: ACTIVE | Noted: 2021-06-18

## 2021-06-18 PROCEDURE — 3288F FALL RISK ASSESSMENT DOCD: CPT | Mod: CPTII,S$GLB,, | Performed by: PHYSICIAN ASSISTANT

## 2021-06-18 PROCEDURE — 99214 PR OFFICE/OUTPT VISIT, EST, LEVL IV, 30-39 MIN: ICD-10-PCS | Mod: S$GLB,,, | Performed by: PHYSICIAN ASSISTANT

## 2021-06-18 PROCEDURE — 82043 UR ALBUMIN QUANTITATIVE: CPT | Performed by: PHYSICIAN ASSISTANT

## 2021-06-18 PROCEDURE — 82570 ASSAY OF URINE CREATININE: CPT | Performed by: PHYSICIAN ASSISTANT

## 2021-06-18 PROCEDURE — 1159F MED LIST DOCD IN RCRD: CPT | Mod: S$GLB,,, | Performed by: PHYSICIAN ASSISTANT

## 2021-06-18 PROCEDURE — 1159F PR MEDICATION LIST DOCUMENTED IN MEDICAL RECORD: ICD-10-PCS | Mod: S$GLB,,, | Performed by: PHYSICIAN ASSISTANT

## 2021-06-18 PROCEDURE — 3288F PR FALLS RISK ASSESSMENT DOCUMENTED: ICD-10-PCS | Mod: CPTII,S$GLB,, | Performed by: PHYSICIAN ASSISTANT

## 2021-06-18 PROCEDURE — 1126F AMNT PAIN NOTED NONE PRSNT: CPT | Mod: S$GLB,,, | Performed by: PHYSICIAN ASSISTANT

## 2021-06-18 PROCEDURE — 1101F PT FALLS ASSESS-DOCD LE1/YR: CPT | Mod: CPTII,S$GLB,, | Performed by: PHYSICIAN ASSISTANT

## 2021-06-18 PROCEDURE — 1101F PR PT FALLS ASSESS DOC 0-1 FALLS W/OUT INJ PAST YR: ICD-10-PCS | Mod: CPTII,S$GLB,, | Performed by: PHYSICIAN ASSISTANT

## 2021-06-18 PROCEDURE — 1126F PR PAIN SEVERITY QUANTIFIED, NO PAIN PRESENT: ICD-10-PCS | Mod: S$GLB,,, | Performed by: PHYSICIAN ASSISTANT

## 2021-06-18 PROCEDURE — 3008F PR BODY MASS INDEX (BMI) DOCUMENTED: ICD-10-PCS | Mod: CPTII,S$GLB,, | Performed by: PHYSICIAN ASSISTANT

## 2021-06-18 PROCEDURE — 3008F BODY MASS INDEX DOCD: CPT | Mod: CPTII,S$GLB,, | Performed by: PHYSICIAN ASSISTANT

## 2021-06-18 PROCEDURE — 99214 OFFICE O/P EST MOD 30 MIN: CPT | Mod: S$GLB,,, | Performed by: PHYSICIAN ASSISTANT

## 2021-06-18 RX ORDER — LANCETS
EACH MISCELLANEOUS
Qty: 100 EACH | Refills: 0 | Status: SHIPPED | OUTPATIENT
Start: 2021-06-18 | End: 2023-09-11

## 2021-06-18 RX ORDER — LEVOCETIRIZINE DIHYDROCHLORIDE 5 MG/1
5 TABLET, FILM COATED ORAL NIGHTLY
Qty: 30 TABLET | Refills: 0 | Status: SHIPPED | OUTPATIENT
Start: 2021-06-18 | End: 2021-07-12

## 2021-06-18 RX ORDER — INSULIN PUMP SYRINGE, 3 ML
EACH MISCELLANEOUS
Qty: 1 EACH | Refills: 0 | Status: SHIPPED | OUTPATIENT
Start: 2021-06-18 | End: 2023-09-11

## 2021-06-18 RX ORDER — AZITHROMYCIN 250 MG/1
TABLET, FILM COATED ORAL
Qty: 6 TABLET | Refills: 0 | Status: SHIPPED | OUTPATIENT
Start: 2021-06-18 | End: 2022-06-13 | Stop reason: ALTCHOICE

## 2021-06-19 ENCOUNTER — LAB VISIT (OUTPATIENT)
Dept: FAMILY MEDICINE | Facility: CLINIC | Age: 66
End: 2021-06-19
Payer: MEDICARE

## 2021-06-19 ENCOUNTER — CLINICAL SUPPORT (OUTPATIENT)
Dept: FAMILY MEDICINE | Facility: CLINIC | Age: 66
End: 2021-06-19
Attending: PHYSICIAN ASSISTANT
Payer: MEDICARE

## 2021-06-19 DIAGNOSIS — Z12.5 SCREENING FOR MALIGNANT NEOPLASM OF PROSTATE: ICD-10-CM

## 2021-06-19 DIAGNOSIS — E11.69 TYPE 2 DIABETES MELLITUS WITH OTHER SPECIFIED COMPLICATION, WITHOUT LONG-TERM CURRENT USE OF INSULIN: ICD-10-CM

## 2021-06-19 DIAGNOSIS — E79.0 HYPERURICEMIA: ICD-10-CM

## 2021-06-19 DIAGNOSIS — E03.9 HYPOTHYROIDISM, UNSPECIFIED TYPE: ICD-10-CM

## 2021-06-19 LAB
ALBUMIN SERPL BCP-MCNC: 3.7 G/DL (ref 3.5–5.2)
ALBUMIN/CREAT UR: ABNORMAL UG/MG (ref 0–30)
ALP SERPL-CCNC: 57 U/L (ref 55–135)
ALT SERPL W/O P-5'-P-CCNC: 33 U/L (ref 10–44)
ANION GAP SERPL CALC-SCNC: 12 MMOL/L (ref 8–16)
AST SERPL-CCNC: 29 U/L (ref 10–40)
BASOPHILS # BLD AUTO: 0.03 K/UL (ref 0–0.2)
BASOPHILS NFR BLD: 0.5 % (ref 0–1.9)
BILIRUB SERPL-MCNC: 0.6 MG/DL (ref 0.1–1)
BUN SERPL-MCNC: 18 MG/DL (ref 8–23)
CALCIUM SERPL-MCNC: 9.7 MG/DL (ref 8.7–10.5)
CHLORIDE SERPL-SCNC: 106 MMOL/L (ref 95–110)
CHOLEST SERPL-MCNC: 102 MG/DL (ref 120–199)
CHOLEST/HDLC SERPL: 3.5 {RATIO} (ref 2–5)
CO2 SERPL-SCNC: 22 MMOL/L (ref 23–29)
COMPLEXED PSA SERPL-MCNC: 0.6 NG/ML (ref 0–4)
CREAT SERPL-MCNC: 1.3 MG/DL (ref 0.5–1.4)
CREAT UR-MCNC: >450 MG/DL (ref 23–375)
DIFFERENTIAL METHOD: ABNORMAL
EOSINOPHIL # BLD AUTO: 0.5 K/UL (ref 0–0.5)
EOSINOPHIL NFR BLD: 8.6 % (ref 0–8)
ERYTHROCYTE [DISTWIDTH] IN BLOOD BY AUTOMATED COUNT: 13.1 % (ref 11.5–14.5)
EST. GFR  (AFRICAN AMERICAN): >60 ML/MIN/1.73 M^2
EST. GFR  (NON AFRICAN AMERICAN): 56.5 ML/MIN/1.73 M^2
ESTIMATED AVG GLUCOSE: 120 MG/DL (ref 68–131)
GLUCOSE SERPL-MCNC: 89 MG/DL (ref 70–110)
HBA1C MFR BLD: 5.8 % (ref 4–5.6)
HCT VFR BLD AUTO: 40 % (ref 40–54)
HDLC SERPL-MCNC: 29 MG/DL (ref 40–75)
HDLC SERPL: 28.4 % (ref 20–50)
HGB BLD-MCNC: 13.4 G/DL (ref 14–18)
IMM GRANULOCYTES # BLD AUTO: 0.01 K/UL (ref 0–0.04)
IMM GRANULOCYTES NFR BLD AUTO: 0.2 % (ref 0–0.5)
LDLC SERPL CALC-MCNC: 50.4 MG/DL (ref 63–159)
LYMPHOCYTES # BLD AUTO: 2.1 K/UL (ref 1–4.8)
LYMPHOCYTES NFR BLD: 34.2 % (ref 18–48)
MCH RBC QN AUTO: 32.1 PG (ref 27–31)
MCHC RBC AUTO-ENTMCNC: 33.5 G/DL (ref 32–36)
MCV RBC AUTO: 96 FL (ref 82–98)
MICROALBUMIN UR DL<=1MG/L-MCNC: 100 UG/ML
MONOCYTES # BLD AUTO: 0.7 K/UL (ref 0.3–1)
MONOCYTES NFR BLD: 10.9 % (ref 4–15)
NEUTROPHILS # BLD AUTO: 2.8 K/UL (ref 1.8–7.7)
NEUTROPHILS NFR BLD: 45.6 % (ref 38–73)
NONHDLC SERPL-MCNC: 73 MG/DL
NRBC BLD-RTO: 0 /100 WBC
PLATELET # BLD AUTO: 266 K/UL (ref 150–450)
PMV BLD AUTO: 10.4 FL (ref 9.2–12.9)
POTASSIUM SERPL-SCNC: 4.2 MMOL/L (ref 3.5–5.1)
PROT SERPL-MCNC: 7.9 G/DL (ref 6–8.4)
RBC # BLD AUTO: 4.17 M/UL (ref 4.6–6.2)
SODIUM SERPL-SCNC: 140 MMOL/L (ref 136–145)
TRIGL SERPL-MCNC: 113 MG/DL (ref 30–150)
TSH SERPL DL<=0.005 MIU/L-ACNC: 1.24 UIU/ML (ref 0.4–4)
URATE SERPL-MCNC: 7.8 MG/DL (ref 3.4–7)
WBC # BLD AUTO: 6.14 K/UL (ref 3.9–12.7)

## 2021-06-19 PROCEDURE — 84550 ASSAY OF BLOOD/URIC ACID: CPT | Performed by: PHYSICIAN ASSISTANT

## 2021-06-19 PROCEDURE — 83036 HEMOGLOBIN GLYCOSYLATED A1C: CPT | Performed by: PHYSICIAN ASSISTANT

## 2021-06-19 PROCEDURE — 84443 ASSAY THYROID STIM HORMONE: CPT | Performed by: PHYSICIAN ASSISTANT

## 2021-06-19 PROCEDURE — 92228 IMG RTA DETC/MNTR DS PHY/QHP: CPT | Mod: TC,S$GLB,, | Performed by: FAMILY MEDICINE

## 2021-06-19 PROCEDURE — 36415 COLL VENOUS BLD VENIPUNCTURE: CPT | Performed by: PHYSICIAN ASSISTANT

## 2021-06-19 PROCEDURE — 92228 DIABETIC EYE SCREENING PHOTO: ICD-10-PCS | Mod: 26,S$GLB,, | Performed by: OPTOMETRIST

## 2021-06-19 PROCEDURE — 85025 COMPLETE CBC W/AUTO DIFF WBC: CPT | Performed by: PHYSICIAN ASSISTANT

## 2021-06-19 PROCEDURE — 84153 ASSAY OF PSA TOTAL: CPT | Performed by: PHYSICIAN ASSISTANT

## 2021-06-19 PROCEDURE — 80053 COMPREHEN METABOLIC PANEL: CPT | Performed by: PHYSICIAN ASSISTANT

## 2021-06-19 PROCEDURE — 92228 IMG RTA DETC/MNTR DS PHY/QHP: CPT | Mod: 26,S$GLB,, | Performed by: OPTOMETRIST

## 2021-06-19 PROCEDURE — 92228 DIABETIC EYE SCREENING PHOTO: ICD-10-PCS | Mod: TC,S$GLB,, | Performed by: FAMILY MEDICINE

## 2021-06-19 PROCEDURE — 80061 LIPID PANEL: CPT | Performed by: PHYSICIAN ASSISTANT

## 2021-06-21 ENCOUNTER — TELEPHONE (OUTPATIENT)
Dept: GASTROENTEROLOGY | Facility: CLINIC | Age: 66
End: 2021-06-21

## 2021-06-23 ENCOUNTER — PATIENT MESSAGE (OUTPATIENT)
Dept: GASTROENTEROLOGY | Facility: CLINIC | Age: 66
End: 2021-06-23

## 2021-08-23 ENCOUNTER — TELEPHONE (OUTPATIENT)
Dept: ADMINISTRATIVE | Facility: HOSPITAL | Age: 66
End: 2021-08-23

## 2021-09-21 ENCOUNTER — TELEPHONE (OUTPATIENT)
Dept: SURGERY | Facility: CLINIC | Age: 66
End: 2021-09-21

## 2021-09-29 ENCOUNTER — TELEPHONE (OUTPATIENT)
Dept: FAMILY MEDICINE | Facility: CLINIC | Age: 66
End: 2021-09-29

## 2021-09-30 ENCOUNTER — IMMUNIZATION (OUTPATIENT)
Dept: FAMILY MEDICINE | Facility: CLINIC | Age: 66
End: 2021-09-30
Payer: MEDICARE

## 2021-09-30 DIAGNOSIS — Z23 NEED FOR VACCINATION: Primary | ICD-10-CM

## 2021-09-30 PROCEDURE — 0003A COVID-19, MRNA, LNP-S, PF, 30 MCG/0.3 ML DOSE VACCINE: CPT | Mod: PBBFAC | Performed by: FAMILY MEDICINE

## 2021-09-30 PROCEDURE — 91300 COVID-19, MRNA, LNP-S, PF, 30 MCG/0.3 ML DOSE VACCINE: CPT | Mod: PBBFAC | Performed by: FAMILY MEDICINE

## 2022-01-03 ENCOUNTER — LAB VISIT (OUTPATIENT)
Dept: FAMILY MEDICINE | Facility: CLINIC | Age: 67
End: 2022-01-03
Payer: MEDICARE

## 2022-01-03 ENCOUNTER — TELEPHONE (OUTPATIENT)
Dept: FAMILY MEDICINE | Facility: CLINIC | Age: 67
End: 2022-01-03
Payer: MEDICARE

## 2022-01-03 DIAGNOSIS — R50.9 FEVER: ICD-10-CM

## 2022-01-03 DIAGNOSIS — R05.9 COUGH: ICD-10-CM

## 2022-01-03 PROCEDURE — U0003 INFECTIOUS AGENT DETECTION BY NUCLEIC ACID (DNA OR RNA); SEVERE ACUTE RESPIRATORY SYNDROME CORONAVIRUS 2 (SARS-COV-2) (CORONAVIRUS DISEASE [COVID-19]), AMPLIFIED PROBE TECHNIQUE, MAKING USE OF HIGH THROUGHPUT TECHNOLOGIES AS DESCRIBED BY CMS-2020-01-R: HCPCS | Performed by: PHYSICIAN ASSISTANT

## 2022-01-03 NOTE — TELEPHONE ENCOUNTER
----- Message from Danny Hernandez sent at 1/3/2022 10:42 AM CST -----  Contact: Pt wife  Type: Needs Medical Advice    Who Called:Pt wife  Best Call Back Number:[phone]    Additional Information Requesting a call back regarding Pt was calling to get an order put in for a Covid test wife states  is having symptoms pt stated to please call when order is available so they can get scheduled would prefer [place] location does not want to drive to [place] Thank you  Please Advise-Thank you

## 2022-01-06 LAB
SARS-COV-2 RNA RESP QL NAA+PROBE: NOT DETECTED
SARS-COV-2- CYCLE NUMBER: NORMAL

## 2022-02-02 DIAGNOSIS — E11.9 TYPE 2 DIABETES MELLITUS WITHOUT COMPLICATION: ICD-10-CM

## 2022-02-07 RX ORDER — ROSUVASTATIN CALCIUM 10 MG/1
10 TABLET, COATED ORAL DAILY
Qty: 90 TABLET | Refills: 0 | Status: SHIPPED | OUTPATIENT
Start: 2022-02-07 | End: 2022-05-06

## 2022-02-07 NOTE — TELEPHONE ENCOUNTER
----- Message from Mark Babcock sent at 2/7/2022  3:02 PM CST -----  Contact: pt at  [phone] or [phone]  Type:  RX Refill Request    Who Called:  pt  Refill or New Rx:  REFILL  RX Name and Strength:  rosuvastatin (CRESTOR) 10 MG tablet  How is the patient currently taking it? (ex. 1XDay):  1XDay  Is this a 30 day or 90 day RX:  90  Preferred Pharmacy with phone number:    [facility]/pharmacy #[id number] [address] [address]  Phone: [phone] Fax: [phone]  Local or Mail Order:  local  Ordering Provider:  Boroks Oliver Call Back Number:  [phone] or [phone]  Additional Information:  pt is calling the office to have a refill of this medication sent in. The pt made an appt for tomorrow to be seen but would prefer to just have the refill sent in and not have to come in to be seen if he does not need to be . Please call back and advise.

## 2022-03-08 ENCOUNTER — TELEPHONE (OUTPATIENT)
Dept: GASTROENTEROLOGY | Facility: CLINIC | Age: 67
End: 2022-03-08
Payer: MEDICARE

## 2022-03-08 DIAGNOSIS — Z01.818 PRE-OP TESTING: ICD-10-CM

## 2022-03-08 NOTE — TELEPHONE ENCOUNTER
----- Message from Nabila  sent at 3/7/2022  2:19 PM CST -----  Regarding: appt  Type: Needs Medical Advice    Who Called:      Best Call Back Number:   Additional Information: Requesting a call back from Nurse, regarding pt wants to reschedule his colon test ,please advise and call back

## 2022-03-08 NOTE — TELEPHONE ENCOUNTER
Spoke with wife and scheduled his cscope for 5/12. Instructions placed in the mail per patients request.

## 2022-05-03 ENCOUNTER — TELEPHONE (OUTPATIENT)
Dept: GASTROENTEROLOGY | Facility: CLINIC | Age: 67
End: 2022-05-03
Payer: MEDICARE

## 2022-05-03 ENCOUNTER — PATIENT MESSAGE (OUTPATIENT)
Dept: ADMINISTRATIVE | Facility: HOSPITAL | Age: 67
End: 2022-05-03
Payer: MEDICARE

## 2022-05-03 NOTE — TELEPHONE ENCOUNTER
----- Message from Lauren Calderon sent at 5/3/2022  9:40 AM CDT -----  Contact: Gabi  Type:  Needs Medical Advice    Who Called:   Wife  Gabi     Would the patient rather a call back or a response via [facility]?  Call    Best Call Back Number: [phone]     Additional Information:  Wife needs to speak to the nurse about his colonoscopy     Patient has a few questions as to what he can do the day after     Please call to advise

## 2022-05-05 RX ORDER — LISINOPRIL AND HYDROCHLOROTHIAZIDE 20; 25 MG/1; MG/1
TABLET ORAL
Qty: 90 TABLET | Refills: 0 | Status: SHIPPED | OUTPATIENT
Start: 2022-05-05 | End: 2022-07-31

## 2022-05-12 ENCOUNTER — PATIENT OUTREACH (OUTPATIENT)
Dept: ADMINISTRATIVE | Facility: HOSPITAL | Age: 67
End: 2022-05-12
Payer: MEDICARE

## 2022-05-12 NOTE — PROGRESS NOTES
Non-compliant report chart audits for COLON CANCER SCREENING. Chart review completed for HM test overdue (mammograms, Colonoscopies, pap smears, DM labs, and/or EYE EXAMs)      Care Everywhere and media, updates requested and reviewed.        Outreach to patient in reference to colon cancer screening.  RE:  Patient needs colon cancer screening    Colonoscopy scheduled 9/1/2022          Outreach:  Colon cancer screening

## 2022-05-13 ENCOUNTER — OFFICE VISIT (OUTPATIENT)
Dept: FAMILY MEDICINE | Facility: CLINIC | Age: 67
End: 2022-05-13
Payer: MEDICARE

## 2022-05-13 DIAGNOSIS — Z91.09 ENVIRONMENTAL ALLERGIES: ICD-10-CM

## 2022-05-13 DIAGNOSIS — R50.9 FEVER, UNSPECIFIED FEVER CAUSE: Primary | ICD-10-CM

## 2022-05-13 LAB
CTP QC/QA: YES
SARS-COV-2 RDRP RESP QL NAA+PROBE: NEGATIVE

## 2022-05-13 PROCEDURE — 99442 PR PHYSICIAN TELEPHONE EVALUATION 11-20 MIN: ICD-10-PCS | Mod: 95,S$GLB,, | Performed by: NURSE PRACTITIONER

## 2022-05-13 PROCEDURE — 99442 PR PHYSICIAN TELEPHONE EVALUATION 11-20 MIN: CPT | Mod: 95,S$GLB,, | Performed by: NURSE PRACTITIONER

## 2022-05-13 PROCEDURE — 4010F ACE/ARB THERAPY RXD/TAKEN: CPT | Mod: 95,CPTII,S$GLB, | Performed by: NURSE PRACTITIONER

## 2022-05-13 PROCEDURE — 4010F PR ACE/ARB THEARPY RXD/TAKEN: ICD-10-PCS | Mod: 95,CPTII,S$GLB, | Performed by: NURSE PRACTITIONER

## 2022-05-13 RX ORDER — PREDNISONE 20 MG/1
20 TABLET ORAL 2 TIMES DAILY
Qty: 10 TABLET | Refills: 0 | Status: SHIPPED | OUTPATIENT
Start: 2022-05-13 | End: 2022-05-18

## 2022-05-13 NOTE — PATIENT INSTRUCTIONS
Increase water.  Decrease sweets.    OK to take sudafed 1 daily to dry up your nose, if needed.  Flonase is good to take until the congestion clears up.    Add the prednisone for 5 days--1 dose twice a day or 2 doses daily.    If you have concerns or questions, please do not hesitate to call.  If you have any questions, please do not hesitate to call.  You can reach us at [phone] Monday through Friday 7 a.m. to 6:30 p.m., Saturday 9 a.m. to 4:30 p.m. and   Thank you for using the [facility]!    JINA Willams, CNP, FNP-BC  [facility]    To rate your experience with HIRA Willams, click on the link below:      [email or web address]

## 2022-05-31 ENCOUNTER — PATIENT MESSAGE (OUTPATIENT)
Dept: ADMINISTRATIVE | Facility: HOSPITAL | Age: 67
End: 2022-05-31
Payer: MEDICARE

## 2022-06-13 ENCOUNTER — OFFICE VISIT (OUTPATIENT)
Dept: FAMILY MEDICINE | Facility: CLINIC | Age: 67
End: 2022-06-13
Payer: MEDICARE

## 2022-06-13 VITALS
WEIGHT: 262.25 LBS | HEART RATE: 64 BPM | BODY MASS INDEX: 36.71 KG/M2 | SYSTOLIC BLOOD PRESSURE: 110 MMHG | HEIGHT: 71 IN | DIASTOLIC BLOOD PRESSURE: 70 MMHG

## 2022-06-13 DIAGNOSIS — R09.89 CHEST CONGESTION: ICD-10-CM

## 2022-06-13 DIAGNOSIS — M10.9 GOUT, UNSPECIFIED CAUSE, UNSPECIFIED CHRONICITY, UNSPECIFIED SITE: ICD-10-CM

## 2022-06-13 DIAGNOSIS — U07.1 COVID-19: Primary | ICD-10-CM

## 2022-06-13 DIAGNOSIS — E11.69 TYPE 2 DIABETES MELLITUS WITH OTHER SPECIFIED COMPLICATION, WITHOUT LONG-TERM CURRENT USE OF INSULIN: ICD-10-CM

## 2022-06-13 DIAGNOSIS — R05.9 COUGH: ICD-10-CM

## 2022-06-13 LAB
CTP QC/QA: YES
SARS-COV-2 RDRP RESP QL NAA+PROBE: POSITIVE

## 2022-06-13 PROCEDURE — 1126F PR PAIN SEVERITY QUANTIFIED, NO PAIN PRESENT: ICD-10-PCS | Mod: CPTII,S$GLB,, | Performed by: PHYSICIAN ASSISTANT

## 2022-06-13 PROCEDURE — 4010F PR ACE/ARB THEARPY RXD/TAKEN: ICD-10-PCS | Mod: CPTII,S$GLB,, | Performed by: PHYSICIAN ASSISTANT

## 2022-06-13 PROCEDURE — 3288F PR FALLS RISK ASSESSMENT DOCUMENTED: ICD-10-PCS | Mod: CPTII,S$GLB,, | Performed by: PHYSICIAN ASSISTANT

## 2022-06-13 PROCEDURE — 1101F PR PT FALLS ASSESS DOC 0-1 FALLS W/OUT INJ PAST YR: ICD-10-PCS | Mod: CPTII,S$GLB,, | Performed by: PHYSICIAN ASSISTANT

## 2022-06-13 PROCEDURE — U0002 COVID-19 LAB TEST NON-CDC: HCPCS | Mod: QW,S$GLB,, | Performed by: PHYSICIAN ASSISTANT

## 2022-06-13 PROCEDURE — 3288F FALL RISK ASSESSMENT DOCD: CPT | Mod: CPTII,S$GLB,, | Performed by: PHYSICIAN ASSISTANT

## 2022-06-13 PROCEDURE — 3074F PR MOST RECENT SYSTOLIC BLOOD PRESSURE < 130 MM HG: ICD-10-PCS | Mod: CPTII,S$GLB,, | Performed by: PHYSICIAN ASSISTANT

## 2022-06-13 PROCEDURE — 3008F PR BODY MASS INDEX (BMI) DOCUMENTED: ICD-10-PCS | Mod: CPTII,S$GLB,, | Performed by: PHYSICIAN ASSISTANT

## 2022-06-13 PROCEDURE — 3008F BODY MASS INDEX DOCD: CPT | Mod: CPTII,S$GLB,, | Performed by: PHYSICIAN ASSISTANT

## 2022-06-13 PROCEDURE — U0002: ICD-10-PCS | Mod: QW,S$GLB,, | Performed by: PHYSICIAN ASSISTANT

## 2022-06-13 PROCEDURE — 99213 PR OFFICE/OUTPT VISIT, EST, LEVL III, 20-29 MIN: ICD-10-PCS | Mod: S$GLB,,, | Performed by: PHYSICIAN ASSISTANT

## 2022-06-13 PROCEDURE — 99213 OFFICE O/P EST LOW 20 MIN: CPT | Mod: S$GLB,,, | Performed by: PHYSICIAN ASSISTANT

## 2022-06-13 PROCEDURE — 3074F SYST BP LT 130 MM HG: CPT | Mod: CPTII,S$GLB,, | Performed by: PHYSICIAN ASSISTANT

## 2022-06-13 PROCEDURE — 4010F ACE/ARB THERAPY RXD/TAKEN: CPT | Mod: CPTII,S$GLB,, | Performed by: PHYSICIAN ASSISTANT

## 2022-06-13 PROCEDURE — 3078F PR MOST RECENT DIASTOLIC BLOOD PRESSURE < 80 MM HG: ICD-10-PCS | Mod: CPTII,S$GLB,, | Performed by: PHYSICIAN ASSISTANT

## 2022-06-13 PROCEDURE — 1159F PR MEDICATION LIST DOCUMENTED IN MEDICAL RECORD: ICD-10-PCS | Mod: CPTII,S$GLB,, | Performed by: PHYSICIAN ASSISTANT

## 2022-06-13 PROCEDURE — 1101F PT FALLS ASSESS-DOCD LE1/YR: CPT | Mod: CPTII,S$GLB,, | Performed by: PHYSICIAN ASSISTANT

## 2022-06-13 PROCEDURE — 1159F MED LIST DOCD IN RCRD: CPT | Mod: CPTII,S$GLB,, | Performed by: PHYSICIAN ASSISTANT

## 2022-06-13 PROCEDURE — 3078F DIAST BP <80 MM HG: CPT | Mod: CPTII,S$GLB,, | Performed by: PHYSICIAN ASSISTANT

## 2022-06-13 PROCEDURE — 1126F AMNT PAIN NOTED NONE PRSNT: CPT | Mod: CPTII,S$GLB,, | Performed by: PHYSICIAN ASSISTANT

## 2022-06-13 NOTE — PROGRESS NOTES
Subjective:       Patient ID: Asim Wilson is a 66 y.o. male.    Chief Complaint: Nasal Congestion    URI   This is a new problem. The current episode started yesterday. The problem has been unchanged. There has been no fever. Associated symptoms include congestion, headaches, rhinorrhea and a sore throat. Pertinent negatives include no abdominal pain or chest pain. He has tried increased fluids, acetaminophen, sleep and antihistamine for the symptoms. The treatment provided moderate relief.     Past Medical History:   Diagnosis Date    Arthritis     Asthma     Cataract     Done OU    Diabetic retinopathy     Gout attack     Hypertension     GERONIMO (obstructive sleep apnea)     noncompliant CPAP    Thyroid disease        Review of Systems   Constitutional: Positive for chills, fatigue and fever. Negative for activity change and appetite change.   HENT: Positive for nasal congestion, postnasal drip, rhinorrhea, sinus pressure/congestion and sore throat.    Respiratory: Negative for chest tightness and shortness of breath.    Cardiovascular: Negative for chest pain.   Gastrointestinal: Negative for abdominal pain.   Neurological: Positive for headaches.         Objective:      Physical Exam  Constitutional:       General: He is not in acute distress.     Appearance: Normal appearance. He is ill-appearing. He is not toxic-appearing or diaphoretic.   Pulmonary:      Effort: No respiratory distress.   Neurological:      Mental Status: He is alert.         Assessment:       Problem List Items Addressed This Visit     Type 2 diabetes mellitus, without long-term current use of insulin    Relevant Orders    Comprehensive Metabolic Panel    Lipid Panel    Hemoglobin A1C    Gout    Relevant Orders    Uric Acid      Other Visit Diagnoses     COVID-19    -  Primary    Relevant Orders    COVID-19 Home Symptom Monitoring  - Duration (days): 14    Cough        Relevant Orders    POCT COVID-19 Rapid Screening (Completed)     Chest congestion        Relevant Orders    POCT COVID-19 Rapid Screening (Completed)          Plan:       COVID-19  -     COVID-19 Home Symptom Monitoring  - Duration (days): 14    Cough  -     POCT COVID-19 Rapid Screening    Chest congestion  -     POCT COVID-19 Rapid Screening    Type 2 diabetes mellitus with other specified complication, without long-term current use of insulin  -     Comprehensive Metabolic Panel; Future; Expected date: 06/13/2022  -     Lipid Panel; Future; Expected date: 06/13/2022  -     Hemoglobin A1C; Future; Expected date: 06/13/2022    Gout, unspecified cause, unspecified chronicity, unspecified site  -     Uric Acid; Future; Expected date: 06/13/2022      Follow up with me post lab     I spent 30 minutes on this encounter, time includes face-to-face, chart review, documentation, test review and orders.

## 2022-06-23 ENCOUNTER — LAB VISIT (OUTPATIENT)
Dept: FAMILY MEDICINE | Facility: CLINIC | Age: 67
End: 2022-06-23
Payer: MEDICARE

## 2022-06-23 DIAGNOSIS — M10.9 GOUT, UNSPECIFIED CAUSE, UNSPECIFIED CHRONICITY, UNSPECIFIED SITE: ICD-10-CM

## 2022-06-23 DIAGNOSIS — E11.69 TYPE 2 DIABETES MELLITUS WITH OTHER SPECIFIED COMPLICATION, WITHOUT LONG-TERM CURRENT USE OF INSULIN: ICD-10-CM

## 2022-06-23 LAB
ALBUMIN SERPL BCP-MCNC: 3.7 G/DL (ref 3.5–5.2)
ALP SERPL-CCNC: 47 U/L (ref 55–135)
ALT SERPL W/O P-5'-P-CCNC: 31 U/L (ref 10–44)
ANION GAP SERPL CALC-SCNC: 8 MMOL/L (ref 8–16)
AST SERPL-CCNC: 23 U/L (ref 10–40)
BILIRUB SERPL-MCNC: 0.6 MG/DL (ref 0.1–1)
BUN SERPL-MCNC: 21 MG/DL (ref 8–23)
CALCIUM SERPL-MCNC: 9.3 MG/DL (ref 8.7–10.5)
CHLORIDE SERPL-SCNC: 108 MMOL/L (ref 95–110)
CHOLEST SERPL-MCNC: 133 MG/DL (ref 120–199)
CHOLEST/HDLC SERPL: 3.6 {RATIO} (ref 2–5)
CO2 SERPL-SCNC: 24 MMOL/L (ref 23–29)
CREAT SERPL-MCNC: 1.4 MG/DL (ref 0.5–1.4)
EST. GFR  (AFRICAN AMERICAN): 59.3 ML/MIN/1.73 M^2
EST. GFR  (NON AFRICAN AMERICAN): 51.3 ML/MIN/1.73 M^2
ESTIMATED AVG GLUCOSE: 123 MG/DL (ref 68–131)
GLUCOSE SERPL-MCNC: 86 MG/DL (ref 70–110)
HBA1C MFR BLD: 5.9 % (ref 4–5.6)
HDLC SERPL-MCNC: 37 MG/DL (ref 40–75)
HDLC SERPL: 27.8 % (ref 20–50)
LDLC SERPL CALC-MCNC: 66.8 MG/DL (ref 63–159)
NONHDLC SERPL-MCNC: 96 MG/DL
POTASSIUM SERPL-SCNC: 4.8 MMOL/L (ref 3.5–5.1)
PROT SERPL-MCNC: 7.2 G/DL (ref 6–8.4)
SODIUM SERPL-SCNC: 140 MMOL/L (ref 136–145)
TRIGL SERPL-MCNC: 146 MG/DL (ref 30–150)
URATE SERPL-MCNC: 10.9 MG/DL (ref 3.4–7)

## 2022-06-23 PROCEDURE — 80061 LIPID PANEL: CPT | Performed by: PHYSICIAN ASSISTANT

## 2022-06-23 PROCEDURE — 99499 NO LOS: ICD-10-PCS | Mod: S$GLB,,, | Performed by: PHYSICIAN ASSISTANT

## 2022-06-23 PROCEDURE — 36415 COLL VENOUS BLD VENIPUNCTURE: CPT | Performed by: PHYSICIAN ASSISTANT

## 2022-06-23 PROCEDURE — 84550 ASSAY OF BLOOD/URIC ACID: CPT | Performed by: PHYSICIAN ASSISTANT

## 2022-06-23 PROCEDURE — 99499 UNLISTED E&M SERVICE: CPT | Mod: S$GLB,,, | Performed by: PHYSICIAN ASSISTANT

## 2022-06-23 PROCEDURE — 83036 HEMOGLOBIN GLYCOSYLATED A1C: CPT | Performed by: PHYSICIAN ASSISTANT

## 2022-06-23 PROCEDURE — 80053 COMPREHEN METABOLIC PANEL: CPT | Performed by: PHYSICIAN ASSISTANT

## 2022-06-27 ENCOUNTER — OFFICE VISIT (OUTPATIENT)
Dept: FAMILY MEDICINE | Facility: CLINIC | Age: 67
End: 2022-06-27
Payer: MEDICARE

## 2022-06-27 VITALS
HEART RATE: 66 BPM | WEIGHT: 267.63 LBS | OXYGEN SATURATION: 96 % | DIASTOLIC BLOOD PRESSURE: 80 MMHG | BODY MASS INDEX: 37.33 KG/M2 | SYSTOLIC BLOOD PRESSURE: 132 MMHG

## 2022-06-27 DIAGNOSIS — N18.30 TYPE 2 DM WITH CKD STAGE 3 AND HYPERTENSION: ICD-10-CM

## 2022-06-27 DIAGNOSIS — E11.69 TYPE 2 DIABETES MELLITUS WITH OTHER SPECIFIED COMPLICATION, WITHOUT LONG-TERM CURRENT USE OF INSULIN: Primary | ICD-10-CM

## 2022-06-27 DIAGNOSIS — E11.22 TYPE 2 DM WITH CKD STAGE 3 AND HYPERTENSION: ICD-10-CM

## 2022-06-27 DIAGNOSIS — Z12.5 SCREENING FOR MALIGNANT NEOPLASM OF PROSTATE: ICD-10-CM

## 2022-06-27 DIAGNOSIS — I12.9 TYPE 2 DM WITH CKD STAGE 3 AND HYPERTENSION: ICD-10-CM

## 2022-06-27 DIAGNOSIS — E03.9 HYPOTHYROIDISM, UNSPECIFIED TYPE: ICD-10-CM

## 2022-06-27 DIAGNOSIS — E78.5 HYPERLIPIDEMIA, UNSPECIFIED HYPERLIPIDEMIA TYPE: ICD-10-CM

## 2022-06-27 LAB
ALBUMIN/CREAT UR: 11.5 UG/MG (ref 0–30)
CREAT UR-MCNC: 104 MG/DL (ref 23–375)
MICROALBUMIN UR DL<=1MG/L-MCNC: 12 UG/ML

## 2022-06-27 PROCEDURE — 3079F DIAST BP 80-89 MM HG: CPT | Mod: CPTII,S$GLB,, | Performed by: PHYSICIAN ASSISTANT

## 2022-06-27 PROCEDURE — 1101F PR PT FALLS ASSESS DOC 0-1 FALLS W/OUT INJ PAST YR: ICD-10-PCS | Mod: CPTII,S$GLB,, | Performed by: PHYSICIAN ASSISTANT

## 2022-06-27 PROCEDURE — 99499 UNLISTED E&M SERVICE: CPT | Mod: S$GLB,,, | Performed by: PHYSICIAN ASSISTANT

## 2022-06-27 PROCEDURE — 1126F AMNT PAIN NOTED NONE PRSNT: CPT | Mod: CPTII,S$GLB,, | Performed by: PHYSICIAN ASSISTANT

## 2022-06-27 PROCEDURE — 4010F ACE/ARB THERAPY RXD/TAKEN: CPT | Mod: CPTII,S$GLB,, | Performed by: PHYSICIAN ASSISTANT

## 2022-06-27 PROCEDURE — 3044F HG A1C LEVEL LT 7.0%: CPT | Mod: CPTII,S$GLB,, | Performed by: PHYSICIAN ASSISTANT

## 2022-06-27 PROCEDURE — 3079F PR MOST RECENT DIASTOLIC BLOOD PRESSURE 80-89 MM HG: ICD-10-PCS | Mod: CPTII,S$GLB,, | Performed by: PHYSICIAN ASSISTANT

## 2022-06-27 PROCEDURE — 1101F PT FALLS ASSESS-DOCD LE1/YR: CPT | Mod: CPTII,S$GLB,, | Performed by: PHYSICIAN ASSISTANT

## 2022-06-27 PROCEDURE — 99214 OFFICE O/P EST MOD 30 MIN: CPT | Mod: S$GLB,,, | Performed by: PHYSICIAN ASSISTANT

## 2022-06-27 PROCEDURE — 82570 ASSAY OF URINE CREATININE: CPT | Performed by: PHYSICIAN ASSISTANT

## 2022-06-27 PROCEDURE — 3288F FALL RISK ASSESSMENT DOCD: CPT | Mod: CPTII,S$GLB,, | Performed by: PHYSICIAN ASSISTANT

## 2022-06-27 PROCEDURE — 1159F MED LIST DOCD IN RCRD: CPT | Mod: CPTII,S$GLB,, | Performed by: PHYSICIAN ASSISTANT

## 2022-06-27 PROCEDURE — 3075F SYST BP GE 130 - 139MM HG: CPT | Mod: CPTII,S$GLB,, | Performed by: PHYSICIAN ASSISTANT

## 2022-06-27 PROCEDURE — 1159F PR MEDICATION LIST DOCUMENTED IN MEDICAL RECORD: ICD-10-PCS | Mod: CPTII,S$GLB,, | Performed by: PHYSICIAN ASSISTANT

## 2022-06-27 PROCEDURE — 3008F PR BODY MASS INDEX (BMI) DOCUMENTED: ICD-10-PCS | Mod: CPTII,S$GLB,, | Performed by: PHYSICIAN ASSISTANT

## 2022-06-27 PROCEDURE — 1160F RVW MEDS BY RX/DR IN RCRD: CPT | Mod: CPTII,S$GLB,, | Performed by: PHYSICIAN ASSISTANT

## 2022-06-27 PROCEDURE — 99214 PR OFFICE/OUTPT VISIT, EST, LEVL IV, 30-39 MIN: ICD-10-PCS | Mod: S$GLB,,, | Performed by: PHYSICIAN ASSISTANT

## 2022-06-27 PROCEDURE — 82043 UR ALBUMIN QUANTITATIVE: CPT | Performed by: PHYSICIAN ASSISTANT

## 2022-06-27 PROCEDURE — 3075F PR MOST RECENT SYSTOLIC BLOOD PRESS GE 130-139MM HG: ICD-10-PCS | Mod: CPTII,S$GLB,, | Performed by: PHYSICIAN ASSISTANT

## 2022-06-27 PROCEDURE — 3288F PR FALLS RISK ASSESSMENT DOCUMENTED: ICD-10-PCS | Mod: CPTII,S$GLB,, | Performed by: PHYSICIAN ASSISTANT

## 2022-06-27 PROCEDURE — 1126F PR PAIN SEVERITY QUANTIFIED, NO PAIN PRESENT: ICD-10-PCS | Mod: CPTII,S$GLB,, | Performed by: PHYSICIAN ASSISTANT

## 2022-06-27 PROCEDURE — 4010F PR ACE/ARB THEARPY RXD/TAKEN: ICD-10-PCS | Mod: CPTII,S$GLB,, | Performed by: PHYSICIAN ASSISTANT

## 2022-06-27 PROCEDURE — 99499 RISK ADDL DX/OHS AUDIT: ICD-10-PCS | Mod: S$GLB,,, | Performed by: PHYSICIAN ASSISTANT

## 2022-06-27 PROCEDURE — 3008F BODY MASS INDEX DOCD: CPT | Mod: CPTII,S$GLB,, | Performed by: PHYSICIAN ASSISTANT

## 2022-06-27 PROCEDURE — 1160F PR REVIEW ALL MEDS BY PRESCRIBER/CLIN PHARMACIST DOCUMENTED: ICD-10-PCS | Mod: CPTII,S$GLB,, | Performed by: PHYSICIAN ASSISTANT

## 2022-06-27 PROCEDURE — 3044F PR MOST RECENT HEMOGLOBIN A1C LEVEL <7.0%: ICD-10-PCS | Mod: CPTII,S$GLB,, | Performed by: PHYSICIAN ASSISTANT

## 2022-06-27 NOTE — PROGRESS NOTES
Subjective:       Patient ID: Asim Wilson is a 66 y.o. male.    Chief Complaint: Follow-up (Go over labs)    Diabetes  He presents for his follow-up diabetic visit. He has type 2 diabetes mellitus. His disease course has been fluctuating. There are no hypoglycemic associated symptoms. Pertinent negatives for hypoglycemia include no dizziness or headaches. Pertinent negatives for diabetes include no blurred vision, no chest pain, no fatigue, no foot paresthesias, no foot ulcerations, no polydipsia, no polyphagia, no polyuria, no visual change, no weakness and no weight loss. There are no hypoglycemic complications. Symptoms are stable. Diabetic complications include retinopathy. Risk factors for coronary artery disease include diabetes mellitus, dyslipidemia, family history, male sex, hypertension and sedentary lifestyle. Current diabetic treatment includes diet and oral agent (monotherapy). He is compliant with treatment most of the time. His weight is stable. He is following a generally healthy diet. When asked about meal planning, he reported none. He has not had a previous visit with a dietitian. He rarely participates in exercise. His home blood glucose trend is fluctuating minimally. An ACE inhibitor/angiotensin II receptor blocker is being taken. He does not see a podiatrist.Eye exam is not current.     Review of Systems   Constitutional: Negative for activity change, appetite change, fatigue, fever and weight loss.   HENT: Negative.    Eyes: Negative for blurred vision.   Respiratory: Negative for cough, chest tightness and stridor.    Cardiovascular: Negative for chest pain.   Gastrointestinal: Negative for abdominal pain, blood in stool and diarrhea.   Endocrine: Negative for polydipsia, polyphagia and polyuria.   Genitourinary: Negative.    Musculoskeletal: Positive for arthralgias. Negative for gait problem.   Integumentary:  Negative.   Neurological: Negative for dizziness, syncope, weakness,  numbness and headaches.         Objective:      Physical Exam  Vitals reviewed.   Constitutional:       General: He is not in acute distress.     Appearance: Normal appearance. He is not ill-appearing, toxic-appearing or diaphoretic.   HENT:      Head: Normocephalic and atraumatic.   Neck:      Vascular: No carotid bruit.   Cardiovascular:      Rate and Rhythm: Normal rate and regular rhythm.      Pulses: Normal pulses.           Dorsalis pedis pulses are 2+ on the right side and 2+ on the left side.        Posterior tibial pulses are 2+ on the right side and 2+ on the left side.      Heart sounds: Normal heart sounds. No murmur heard.    No friction rub. No gallop.   Pulmonary:      Effort: Pulmonary effort is normal. No respiratory distress.      Breath sounds: Normal breath sounds. No stridor. No wheezing, rhonchi or rales.   Chest:      Chest wall: No tenderness.   Abdominal:      Tenderness: There is no abdominal tenderness.   Musculoskeletal:         General: No swelling.      Right foot: Normal range of motion. No deformity, bunion, Charcot foot, foot drop or prominent metatarsal heads.      Left foot: Normal range of motion. No deformity, bunion, Charcot foot, foot drop or prominent metatarsal heads.   Feet:      Right foot:      Protective Sensation: 6 sites tested. 6 sites sensed.      Skin integrity: Callus present. No ulcer, blister, skin breakdown, erythema, warmth, dry skin or fissure.      Toenail Condition: Right toenails are normal.      Left foot:      Protective Sensation: 6 sites tested. 6 sites sensed.      Skin integrity: Callus present. No ulcer, blister, skin breakdown, erythema, warmth, dry skin or fissure.      Toenail Condition: Left toenails are normal.   Lymphadenopathy:      Cervical: No cervical adenopathy.   Neurological:      Mental Status: He is alert.         Lab Results   Component Value Date    WBC 6.14 06/19/2021    HGB 13.4 (L) 06/19/2021    HCT 40.0 06/19/2021    MCV 96  06/19/2021     06/19/2021     CMP  Sodium   Date Value Ref Range Status   06/23/2022 140 136 - 145 mmol/L Final     Potassium   Date Value Ref Range Status   06/23/2022 4.8 3.5 - 5.1 mmol/L Final     Chloride   Date Value Ref Range Status   06/23/2022 108 95 - 110 mmol/L Final     CO2   Date Value Ref Range Status   06/23/2022 24 23 - 29 mmol/L Final     Glucose   Date Value Ref Range Status   06/23/2022 86 70 - 110 mg/dL Final     BUN   Date Value Ref Range Status   06/23/2022 21 8 - 23 mg/dL Final     Creatinine   Date Value Ref Range Status   06/23/2022 1.4 0.5 - 1.4 mg/dL Final     Calcium   Date Value Ref Range Status   06/23/2022 9.3 8.7 - 10.5 mg/dL Final     Total Protein   Date Value Ref Range Status   06/23/2022 7.2 6.0 - 8.4 g/dL Final     Albumin   Date Value Ref Range Status   06/23/2022 3.7 3.5 - 5.2 g/dL Final   10/14/2020 3.9 3.6 - 5.1 g/dL Final     Comment:     For additional information, please refer to   http://education.[employer]/faq/MNL423 (This link is   being provided for informational/ educational purposes only.)  This test was developed and its analytical performance   characteristics have been determined by [employer]  [facility]. It has not been cleared or approved by the   [place] Food and Drug Administration. This assay has been validated   pursuant to the CLIA regulations and is used for clinical   purposes.  @ Test Performed By:  [employer]  Angelo Moreau M.D.,   [address] [place] [address]  [place]  [id number]       Total Bilirubin   Date Value Ref Range Status   06/23/2022 0.6 0.1 - 1.0 mg/dL Final     Comment:     For infants and newborns, interpretation of results should be based  on gestational age, weight and in agreement with clinical  observations.    Premature Infant recommended reference ranges:  Up to 24 hours.............<8.0 mg/dL  Up to 48 hours............<12.0 mg/dL  3-5  days..................<15.0 mg/dL  6-29 days.................<15.0 mg/dL       Alkaline Phosphatase   Date Value Ref Range Status   06/23/2022 47 (L) 55 - 135 U/L Final     AST   Date Value Ref Range Status   06/23/2022 23 10 - 40 U/L Final     ALT   Date Value Ref Range Status   06/23/2022 31 10 - 44 U/L Final     Anion Gap   Date Value Ref Range Status   06/23/2022 8 8 - 16 mmol/L Final     eGFR if    Date Value Ref Range Status   06/23/2022 59.3 (A) >60 mL/min/1.73 m^2 Final     eGFR if non    Date Value Ref Range Status   06/23/2022 51.3 (A) >60 mL/min/1.73 m^2 Final     Comment:     Calculation used to obtain the estimated glomerular filtration  rate (eGFR) is the CKD-EPI equation.        Lab Results   Component Value Date    CHOL 133 06/23/2022     Lab Results   Component Value Date    HDL 37 (L) 06/23/2022     Lab Results   Component Value Date    LDLCALC 66.8 06/23/2022     Lab Results   Component Value Date    TRIG 146 06/23/2022     Lab Results   Component Value Date    CHOLHDL 27.8 06/23/2022     Lab Results   Component Value Date    HGBA1C 5.9 (H) 06/23/2022     Assessment:       Problem List Items Addressed This Visit     Type 2 DM with CKD stage 3 and hypertension    Type 2 diabetes mellitus, without long-term current use of insulin - Primary    Relevant Orders    MICROALBUMIN / CREATININE RATIO URINE    Ambulatory referral/consult to Optometry    Comprehensive Metabolic Panel    Hemoglobin A1C    CBC Auto Differential    Hypothyroidism    Relevant Orders    TSH    Hyperlipidemia    Relevant Orders    Lipid Panel      Other Visit Diagnoses     Screening for malignant neoplasm of prostate        Relevant Orders    PSA, Screening          Plan:       Type 2 diabetes mellitus with other specified complication, without long-term current use of insulin  -     MICROALBUMIN / CREATININE RATIO URINE  -     Ambulatory referral/consult to Optometry; Future; Expected date:  07/04/2022  -     Comprehensive Metabolic Panel; Future; Expected date: 12/27/2022  -     Hemoglobin A1C; Future; Expected date: 12/27/2022  -     CBC Auto Differential; Future; Expected date: 12/27/2022    Screening for malignant neoplasm of prostate  -     PSA, Screening; Future; Expected date: 06/27/2022    Hyperlipidemia, unspecified hyperlipidemia type  -     Lipid Panel; Future; Expected date: 12/27/2022    Hypothyroidism, unspecified type  -     TSH; Future; Expected date: 12/27/2022    Type 2 DM with CKD stage 3 and hypertension  The patient is asked to make an attempt to improve diet and exercise patterns to aid in medical management of this problem.           Reports scheduled for colonoscopy in September 2022

## 2022-07-06 ENCOUNTER — TELEPHONE (OUTPATIENT)
Dept: FAMILY MEDICINE | Facility: CLINIC | Age: 67
End: 2022-07-06
Payer: MEDICARE

## 2022-07-06 RX ORDER — COLCHICINE 0.6 MG/1
0.6 TABLET ORAL DAILY
Qty: 30 TABLET | Refills: 11 | Status: SHIPPED | OUTPATIENT
Start: 2022-07-06 | End: 2023-05-16

## 2022-07-06 NOTE — TELEPHONE ENCOUNTER
----- Message from Jordi Joiner sent at 7/6/2022  2:35 PM CDT -----  Contact: Spouse.Gabi  Type:  Same Day Appointment Request    Caller is requesting a same day appointment.  Caller declined first available appointment listed below.      Name of Caller:  Spouse/Gabi  When is the first available appointment?  7/7  Symptoms:  Gout in foot  Best Call Back Number:  [phone]  Additional Information:   Would like to have Colchicine called in instead of appt, if possible. [facility] indomethacin (INDOCIN) 50 MG capsule upsets stomach      [facility]/pharmacy #[id number] - [address] [address]  Phone: [phone] Fax: [phone]

## 2022-08-03 ENCOUNTER — PATIENT MESSAGE (OUTPATIENT)
Dept: GASTROENTEROLOGY | Facility: CLINIC | Age: 67
End: 2022-08-03
Payer: MEDICARE

## 2022-08-16 ENCOUNTER — PES CALL (OUTPATIENT)
Dept: ADMINISTRATIVE | Facility: CLINIC | Age: 67
End: 2022-08-16
Payer: MEDICARE

## 2022-10-11 ENCOUNTER — TELEPHONE (OUTPATIENT)
Dept: GASTROENTEROLOGY | Facility: CLINIC | Age: 67
End: 2022-10-11
Payer: MEDICARE

## 2022-10-11 NOTE — TELEPHONE ENCOUNTER
Vmail left for pt regarding new c-scope prep. Callback # to clinic provided. Pre pinstructions sent to pts mychart. Golytely called to pharmacy.

## 2022-10-12 ENCOUNTER — TELEPHONE (OUTPATIENT)
Dept: GASTROENTEROLOGY | Facility: CLINIC | Age: 67
End: 2022-10-12
Payer: MEDICARE

## 2022-10-12 NOTE — TELEPHONE ENCOUNTER
----- Message from Alison Hurley LPN sent at 10/12/2022 12:26 PM CDT -----    ----- Message -----  From: Sea Evans  Sent: 10/12/2022  12:03 PM CDT  To: [facility] Staff    Type:  Patient Returning Call    Who Called: Pt     Who Left Message for Patient:John Amado LPN    Does the patient know what this is regarding?:yes Outpatient Procedure  screening     Would the patient rather a call back or a response via [facility]? Call back     Best Call Back Number:[phone] (mobile)     Additional Information:

## 2022-10-18 ENCOUNTER — ANESTHESIA EVENT (OUTPATIENT)
Dept: ENDOSCOPY | Facility: HOSPITAL | Age: 67
End: 2022-10-18
Payer: MEDICARE

## 2022-10-18 ENCOUNTER — HOSPITAL ENCOUNTER (OUTPATIENT)
Facility: HOSPITAL | Age: 67
Discharge: HOME | End: 2022-10-18
Attending: INTERNAL MEDICINE | Admitting: INTERNAL MEDICINE
Payer: MEDICARE

## 2022-10-18 ENCOUNTER — ANESTHESIA (OUTPATIENT)
Dept: ENDOSCOPY | Facility: HOSPITAL | Age: 67
End: 2022-10-18
Payer: MEDICARE

## 2022-10-18 DIAGNOSIS — Z12.11 SCREEN FOR COLON CANCER: ICD-10-CM

## 2022-10-18 LAB — GLUCOSE SERPL-MCNC: 78 MG/DL (ref 70–110)

## 2022-10-18 PROCEDURE — 37000009 HC ANESTHESIA EA ADD 15 MINS: Mod: PO | Performed by: INTERNAL MEDICINE

## 2022-10-18 PROCEDURE — D9220A PRA ANESTHESIA: ICD-10-PCS | Mod: PT,ANES,, | Performed by: ANESTHESIOLOGY

## 2022-10-18 PROCEDURE — 88305 TISSUE EXAM BY PATHOLOGIST: CPT | Mod: 26,,, | Performed by: STUDENT IN AN ORGANIZED HEALTH CARE EDUCATION/TRAINING PROGRAM

## 2022-10-18 PROCEDURE — 27200997: Mod: PO | Performed by: INTERNAL MEDICINE

## 2022-10-18 PROCEDURE — D9220A PRA ANESTHESIA: Mod: PT,ANES,, | Performed by: ANESTHESIOLOGY

## 2022-10-18 PROCEDURE — D9220A PRA ANESTHESIA: ICD-10-PCS | Mod: PT,CRNA,, | Performed by: NURSE ANESTHETIST, CERTIFIED REGISTERED

## 2022-10-18 PROCEDURE — 27201089 HC SNARE, DISP (ANY): Mod: PO | Performed by: INTERNAL MEDICINE

## 2022-10-18 PROCEDURE — 25000003 PHARM REV CODE 250: Mod: PO | Performed by: NURSE ANESTHETIST, CERTIFIED REGISTERED

## 2022-10-18 PROCEDURE — 45385 COLONOSCOPY W/LESION REMOVAL: CPT | Mod: PT,,, | Performed by: INTERNAL MEDICINE

## 2022-10-18 PROCEDURE — 37000008 HC ANESTHESIA 1ST 15 MINUTES: Mod: PO | Performed by: INTERNAL MEDICINE

## 2022-10-18 PROCEDURE — 63600175 PHARM REV CODE 636 W HCPCS: Mod: PO | Performed by: INTERNAL MEDICINE

## 2022-10-18 PROCEDURE — 45385 COLONOSCOPY W/LESION REMOVAL: CPT | Mod: PT,PO | Performed by: INTERNAL MEDICINE

## 2022-10-18 PROCEDURE — 88305 TISSUE EXAM BY PATHOLOGIST: ICD-10-PCS | Mod: 26,,, | Performed by: STUDENT IN AN ORGANIZED HEALTH CARE EDUCATION/TRAINING PROGRAM

## 2022-10-18 PROCEDURE — 63600175 PHARM REV CODE 636 W HCPCS: Mod: PO | Performed by: NURSE ANESTHETIST, CERTIFIED REGISTERED

## 2022-10-18 PROCEDURE — 88305 TISSUE EXAM BY PATHOLOGIST: CPT | Performed by: STUDENT IN AN ORGANIZED HEALTH CARE EDUCATION/TRAINING PROGRAM

## 2022-10-18 PROCEDURE — D9220A PRA ANESTHESIA: Mod: PT,CRNA,, | Performed by: NURSE ANESTHETIST, CERTIFIED REGISTERED

## 2022-10-18 PROCEDURE — 45385 PR COLONOSCOPY,REMV LESN,SNARE: ICD-10-PCS | Mod: PT,,, | Performed by: INTERNAL MEDICINE

## 2022-10-18 RX ORDER — SODIUM CHLORIDE 0.9 % (FLUSH) 0.9 %
10 SYRINGE (ML) INJECTION
Status: DISCONTINUED | OUTPATIENT
Start: 2022-10-18 | End: 2022-10-18 | Stop reason: HOSPADM

## 2022-10-18 RX ORDER — SODIUM CHLORIDE, SODIUM LACTATE, POTASSIUM CHLORIDE, CALCIUM CHLORIDE 600; 310; 30; 20 MG/100ML; MG/100ML; MG/100ML; MG/100ML
INJECTION, SOLUTION INTRAVENOUS CONTINUOUS
Status: DISCONTINUED | OUTPATIENT
Start: 2022-10-18 | End: 2022-10-18 | Stop reason: HOSPADM

## 2022-10-18 RX ORDER — PROPOFOL 10 MG/ML
VIAL (ML) INTRAVENOUS
Status: DISCONTINUED | OUTPATIENT
Start: 2022-10-18 | End: 2022-10-18

## 2022-10-18 RX ORDER — LIDOCAINE HCL/PF 100 MG/5ML
SYRINGE (ML) INTRAVENOUS
Status: DISCONTINUED | OUTPATIENT
Start: 2022-10-18 | End: 2022-10-18

## 2022-10-18 RX ADMIN — PROPOFOL 50 MG: 10 INJECTION, EMULSION INTRAVENOUS at 11:10

## 2022-10-18 RX ADMIN — PROPOFOL 175 MG: 10 INJECTION, EMULSION INTRAVENOUS at 11:10

## 2022-10-18 RX ADMIN — SODIUM CHLORIDE, SODIUM LACTATE, POTASSIUM CHLORIDE, AND CALCIUM CHLORIDE: .6; .31; .03; .02 INJECTION, SOLUTION INTRAVENOUS at 11:10

## 2022-10-18 RX ADMIN — LIDOCAINE HYDROCHLORIDE 100 MG: 20 INJECTION, SOLUTION INTRAVENOUS at 11:10

## 2022-10-18 NOTE — DISCHARGE SUMMARY
[place] - Endoscopy  Discharge Note  Short Stay  Discharge Note  Short Stay      SUMMARY     Admit Date: 10/18/2022    Attending Physician: Rahat Jarrell MD     Discharge Physician: Rahat Jarrell MD    Discharge Date: 10/18/2022 12:08 PM    Final Diagnosis: Colon cancer screening [Z12.11]    Disposition: HOME OR SELF CARE    Patient Instructions:   Current Discharge Medication List        CONTINUE these medications which have NOT CHANGED    Details   aspirin (ECOTRIN) 81 MG EC tablet TAKE 1 TABLET BY MOUTH EVERY DAY  Qty: 90 tablet, Refills: 3      colchicine (COLCRYS) 0.6 mg tablet Take 1 tablet (0.6 mg total) by mouth once daily.  Qty: 30 tablet, Refills: 11      levocetirizine (XYZAL) 5 MG tablet TAKE 1 TABLET BY MOUTH EVERY DAY IN THE EVENING  Qty: 90 tablet, Refills: 3    Associated Diagnoses: Acute sore throat      levothyroxine (SYNTHROID) 137 MCG Tab tablet TAKE 1 TABLET (137 MCG TOTAL) BY MOUTH BEFORE BREAKFAST.  Qty: 90 tablet, Refills: 3    Associated Diagnoses: Hypothyroidism, unspecified type      lisinopriL-hydrochlorothiazide (PRINZIDE,ZESTORETIC) 20-25 mg Tab TAKE 1 TABLET BY MOUTH EVERY DAY  Qty: 90 tablet, Refills: 0      metFORMIN (GLUCOPHAGE-XR) 500 MG ER 24hr tablet TAKE 1 TABLET BY MOUTH EVERY DAY  Qty: 90 tablet, Refills: 3      montelukast (SINGULAIR) 10 mg tablet TAKE 1 TABLET BY MOUTH EVERY DAY  Qty: 90 tablet, Refills: 3      rosuvastatin (CRESTOR) 10 MG tablet TAKE 1 TABLET BY MOUTH EVERY DAY  Qty: 90 tablet, Refills: 3      allopurinoL (ZYLOPRIM) 300 MG tablet TAKE 1 TABLET (300 MG TOTAL) BY MOUTH DAILY AS NEEDED.  Qty: 90 tablet, Refills: 3      blood sugar diagnostic Strp To check BG 1 times daily, to use with insurance preferred meter  Qty: 100 strip, Refills: 0    Associated Diagnoses: Type 2 diabetes mellitus with other specified complication, without long-term current use of insulin      blood-glucose meter kit To check BG 1 times daily, to use with insurance  preferred meter  Qty: 1 each, Refills: 0    Associated Diagnoses: Type 2 diabetes mellitus with other specified complication, without long-term current use of insulin      indomethacin (INDOCIN) 50 MG capsule Take 1 capsule (50 mg total) by mouth 3 (three) times daily as needed.  Qty: 30 capsule, Refills: 3      lancets Misc To check BG 1 times daily, to use with insurance preferred meter  Qty: 100 each, Refills: 0    Associated Diagnoses: Type 2 diabetes mellitus with other specified complication, without long-term current use of insulin      omeprazole (PRILOSEC) 40 MG capsule Take 1 capsule (40 mg total) by mouth once daily.  Qty: 30 capsule, Refills: 0    Associated Diagnoses: Gastroesophageal reflux disease, unspecified whether esophagitis present             Discharge Procedure Orders (must include Diet, Follow-up, Activity)    Follow Up:  Follow up with PCP as previously scheduled  Resume routine diet.  Activity as tolerated.    No driving day of procedure.

## 2022-10-18 NOTE — TRANSFER OF CARE
Anesthesia Transfer of Care Note    Patient: Asim Wilson    Procedure(s) Performed: Procedure(s) (LRB):  COLONOSCOPY (N/A)    Patient location: PACU    Anesthesia Type: general    Transport from OR: Transported from OR on room air with adequate spontaneous ventilation    Post pain: adequate analgesia    Post assessment: no apparent anesthetic complications and tolerated procedure well    Post vital signs: stable    Level of consciousness: sedated and awake    Nausea/Vomiting: no nausea/vomiting    Complications: none    Transfer of care protocol was followed      Last vitals:   Visit Vitals  BP (!) 114/59 (BP Location: Right arm, Patient Position: Lying)   Pulse 62   Temp 36.7 °C (98 °F) (Temporal)   Resp 17   SpO2 (!) 93%

## 2022-10-18 NOTE — ANESTHESIA PREPROCEDURE EVALUATION
10/18/2022  Asim Wilson is a 67 y.o., male.    Pre-op Assessment    I have reviewed the Patient Summary Reports.    I have reviewed the Nursing Notes.    I have reviewed the Medications.     Review of Systems  Anesthesia Hx:  No problems with previous Anesthesia    Social:  Former Smoker, Alcohol Use    Cardiovascular:   Hypertension    Pulmonary:   Asthma Sleep Apnea    Musculoskeletal:   Arthritis     Endocrine:   Diabetes, type 2 Hypothyroidism        Physical Exam  General:  Obesity      Airway/Jaw/Neck:  Airway Findings: Mouth Opening: Normal   Tongue: Normal   Mallampati: II TM Distance: Normal, at least 6 cm         Eyes/Ears/Nose:  Eyes/Ears/Nose Findings:    Dental:  DENTAL FINDINGS: Normal   Chest/Lungs:  Chest/Lungs Findings: Clear to auscultation, Normal Respiratory Rate      Heart/Vascular:  Heart Findings: Rate: Normal  Rhythm: Regular Rhythm        Mental Status:  Mental Status Findings: Normal        Anesthesia Plan  Type of Anesthesia, risks & benefits discussed:  Anesthesia Type:  Gen Natural Airway    Patient's Preference:   Plan Factors:          Intra-op Monitoring Plan: Standard ASA Monitors  Intra-op Monitoring Plan Comments:   Post Op Pain Control Plan:   Post Op Pain Control Plan Comments:     Induction:   IV  Beta Blocker:  Patient is not currently on a Beta-Blocker (No further documentation required).       Informed Consent: Informed consent signed with the Patient and all parties understand the risks and agree with anesthesia plan.  All questions answered.  Anesthesia consent signed with patient.  ASA Score: 3     Day of Surgery Review of History & Physical:    H&P Update referred to the surgeon/provider.          Ready For Surgery From Anesthesia Perspective.           Physical Exam  General: Obesity    Airway:  Mallampati: II   Mouth Opening: Normal  TM Distance: Normal, at  least 6 cm  Tongue: Normal    Chest/Lungs:  Clear to auscultation, Normal Respiratory Rate    Heart:  Rate: Normal  Rhythm: Regular Rhythm          Anesthesia Plan  Type of Anesthesia, risks & benefits discussed:    Anesthesia Type: Gen Natural Airway  Intra-op Monitoring Plan: Standard ASA Monitors  Induction:  IV  Informed Consent: Informed consent signed with the Patient and all parties understand the risks and agree with anesthesia plan.  All questions answered.   ASA Score: 3  Day of Surgery Review of History & Physical: H&P Update referred to the surgeon/provider.    Ready For Surgery From Anesthesia Perspective.       .

## 2022-10-18 NOTE — H&P
History & Physical - Short Stay  Gastroenterology      SUBJECTIVE:     Procedure: Colonoscopy    Chief Complaint/Indication for Procedure: Screening    PTA Medications   Medication Sig    aspirin (ECOTRIN) 81 MG EC tablet TAKE 1 TABLET BY MOUTH EVERY DAY    colchicine (COLCRYS) 0.6 mg tablet Take 1 tablet (0.6 mg total) by mouth once daily.    levocetirizine (XYZAL) 5 MG tablet TAKE 1 TABLET BY MOUTH EVERY DAY IN THE EVENING    levothyroxine (SYNTHROID) 137 MCG Tab tablet TAKE 1 TABLET (137 MCG TOTAL) BY MOUTH BEFORE BREAKFAST.    lisinopriL-hydrochlorothiazide (PRINZIDE,ZESTORETIC) 20-25 mg Tab TAKE 1 TABLET BY MOUTH EVERY DAY    metFORMIN (GLUCOPHAGE-XR) 500 MG ER 24hr tablet TAKE 1 TABLET BY MOUTH EVERY DAY    montelukast (SINGULAIR) 10 mg tablet TAKE 1 TABLET BY MOUTH EVERY DAY    rosuvastatin (CRESTOR) 10 MG tablet TAKE 1 TABLET BY MOUTH EVERY DAY    allopurinoL (ZYLOPRIM) 300 MG tablet TAKE 1 TABLET (300 MG TOTAL) BY MOUTH DAILY AS NEEDED. (Patient not taking: Reported on 6/27/2022)    blood sugar diagnostic Strp To check BG 1 times daily, to use with insurance preferred meter (Patient not taking: No sig reported)    blood-glucose meter kit To check BG 1 times daily, to use with insurance preferred meter (Patient not taking: Reported on 6/13/2022)    indomethacin (INDOCIN) 50 MG capsule Take 1 capsule (50 mg total) by mouth 3 (three) times daily as needed. (Patient not taking: Reported on 6/27/2022)    lancets Misc To check BG 1 times daily, to use with insurance preferred meter (Patient not taking: No sig reported)    omeprazole (PRILOSEC) 40 MG capsule Take 1 capsule (40 mg total) by mouth once daily. (Patient not taking: No sig reported)       Review of patient's allergies indicates:  No Known Allergies     Past Medical History:   Diagnosis Date    Arthritis     Asthma     Cataract     Done OU    Diabetic retinopathy     Gout attack     Hypertension     GERONIMO (obstructive sleep apnea)     noncompliant CPAP     Thyroid disease      Past Surgical History:   Procedure Laterality Date    CATARACT EXTRACTION W/  INTRAOCULAR LENS IMPLANT Right 06/13/2019    Dr Moses    CATARACT EXTRACTION W/  INTRAOCULAR LENS IMPLANT Left 07/18/2019    Dr Moses    COLONOSCOPY      CORONARY ANGIOGRAPHY N/A 12/10/2020    Procedure: ANGIOGRAM, CORONARY ARTERY;  Surgeon: Roseanna Barr MD;  Location: [facility] CATH;  Service: Cardiology;  Laterality: N/A;    Cyst removed      From back of neck    cyst removed      from back    LEFT HEART CATHETERIZATION Left 12/10/2020    Procedure: Left heart cath;  Surgeon: Roseanna Barr MD;  Location: [facility] CATH;  Service: Cardiology;  Laterality: Left;    PHACOEMULSIFICATION OF CATARACT Right 6/13/2019    Procedure: PHACOEMULSIFICATION, CATARACT;  Surgeon: Gabe Moses Jr., MD;  Location: [facility] OR;  Service: Ophthalmology;  Laterality: Right;  Right    PHACOEMULSIFICATION OF CATARACT Left 7/18/2019    Procedure: PHACOEMULSIFICATION, CATARACT;  Surgeon: Gabe Moses Jr., MD;  Location: [facility] OR;  Service: Ophthalmology;  Laterality: Left;  Left     Family History   Problem Relation Age of Onset    Cancer Mother     Alzheimer's disease Father     No Known Problems Sister     No Known Problems Maternal Grandmother     No Known Problems Maternal Grandfather     No Known Problems Paternal Grandmother     No Known Problems Paternal Grandfather     No Known Problems Sister     No Known Problems Sister     Amblyopia Neg Hx     Blindness Neg Hx     Cataracts Neg Hx     Diabetes Neg Hx     Hypertension Neg Hx     Macular degeneration Neg Hx     Glaucoma Neg Hx     Retinal detachment Neg Hx     Strabismus Neg Hx     Stroke Neg Hx     Thyroid disease Neg Hx      Social History     Tobacco Use    Smoking status: Former    Smokeless tobacco: Never    Tobacco comments:     Quit 40 yrs ago   Substance Use Topics    Alcohol use: Yes     Comment: Occasionally    Drug use: Never         OBJECTIVE:     Vital Signs (Most  Recent)       Physical Exam                                                        GENERAL:  Comfortable, in no acute distress.                                 HEENT EXAM:  Nonicteric.  No adenopathy.  Oropharynx is clear.               NECK:  Supple.                                                               LUNGS:  Clear.                                                               CARDIAC:  Regular rate and rhythm.  S1, S2.  No murmur.                      ABDOMEN:  Soft, positive bowel sounds, nontender.  No hepatosplenomegaly or masses.  No rebound or guarding.                                             EXTREMITIES:  No edema.     MENTAL STATUS:  Normal, alert and oriented.      ASSESSMENT/PLAN:     Assessment: Colorectal cancer screening    Plan: Colonoscopy    Anesthesia Plan: General    ASA Grade: ASA 2 - Patient with mild systemic disease with no functional limitations    MALLAMPATI SCORE:  I (soft palate, uvula, fauces, and tonsillar pillars visible)

## 2022-10-18 NOTE — ANESTHESIA POSTPROCEDURE EVALUATION
Anesthesia Post Evaluation    Patient: Asim Wilson    Procedure(s) Performed: Procedure(s) (LRB):  COLONOSCOPY (N/A)    Final Anesthesia Type: general      Patient location during evaluation: PACU  Patient participation: Yes- Able to Participate  Level of consciousness: awake and alert  Post-procedure vital signs: reviewed and stable  Pain management: adequate  Airway patency: patent    PONV status at discharge: No PONV  Anesthetic complications: no      Cardiovascular status: blood pressure returned to baseline  Respiratory status: unassisted and room air  Hydration status: euvolemic  Follow-up not needed.          Vitals Value Taken Time   BP 99/61 10/18/22 1218   Temp 36.2 °C (97.2 °F) 10/18/22 1205   Pulse 66 10/18/22 1218   Resp 15 10/18/22 1218   SpO2 96 % 10/18/22 1218         Event Time   Out of Recovery 12:29:15         Pain/Bravo Score: Bravo Score: 6 (10/18/2022 12:05 PM)

## 2022-10-18 NOTE — PROVATION PATIENT INSTRUCTIONS
Discharge Summary/Instructions after an Endoscopic Procedure  Patient Name: Asim Wilson  Patient MRN: [id number]  Patient YOB: 1955 Tuesday, October 18, 2022  Rahat Jarrell MD  Dear patient,  As a result of recent federal legislation (The Federal Cures Act), you may   receive lab or pathology results from your procedure in your [facility]   account before your physician is able to contact you. Your physician or   their representative will relay the results to you with their   recommendations at their soonest availability.  Thank you,  RESTRICTIONS:  During your procedure today, you received medications for sedation.  These   medications may affect your judgment, balance and coordination.  Therefore,   for 24 hours, you have the following restrictions:   - DO NOT drive a car, operate machinery, make legal/financial decisions,   sign important papers or drink alcohol.    ACTIVITY:  Today: no heavy lifting, straining or running due to procedural   sedation/anesthesia.  The following day: return to full activity including work.  DIET:  Eat and drink normally unless instructed otherwise.     TREATMENT FOR COMMON SIDE EFFECTS:  - Mild abdominal pain, nausea, belching, bloating or excessive gas:  rest,   eat lightly and use a heating pad.  - Sore Throat: treat with throat lozenges and/or gargle with warm salt   water.  - Because air was used during the procedure, expelling large amounts of air   from your rectum or belching is normal.  - If a bowel prep was taken, you may not have a bowel movement for 1-3 days.    This is normal.  SYMPTOMS TO WATCH FOR AND REPORT TO YOUR PHYSICIAN:  1. Abdominal pain or bloating, other than gas cramps.  2. Chest pain.  3. Back pain.  4. Signs of infection such as: chills or fever occurring within 24 hours   after the procedure.  5. Rectal bleeding, which would show as bright red, maroon, or black stools.   (A tablespoon of blood from the rectum is not serious,  especially if   hemorrhoids are present.)  6. Vomiting.  7. Weakness or dizziness.  GO DIRECTLY TO THE NEAREST EMERGENCY ROOM IF YOU HAVE ANY OF THE FOLLOWING:      Difficulty breathing              Chills and/or fever over 101 F   Persistent vomiting and/or vomiting blood   Severe abdominal pain   Severe chest pain   Black, tarry stools   Bleeding- more than one tablespoon   Any other symptom or condition that you feel may need urgent attention  Your doctor recommends these additional instructions:  If any biopsies were taken, your doctors clinic will contact you in 1 to 2   weeks with any results.  We are waiting for your pathology results.   Your physician has recommended a repeat colonoscopy in three years for   surveillance based on pathology results.   You are being discharged to home.  For questions, problems or results please call your physician - Rahat Jarrell MD at Work:  [phone].  EMERGENCY PHONE NUMBER: [phone], LAB RESULTS: [phone]  IF A COMPLICATION OR EMERGENCY SITUATION ARISES AND YOU ARE UNABLE TO REACH   YOUR PHYSICIAN - GO DIRECTLY TO THE EMERGENCY ROOM.  ___________________________________________  Nurse Signature  ___________________________________________  Patient/Designated Responsible Party Signature  Rahat Jarrell MD  10/18/2022 12:07:38 PM  This report has been verified and signed electronically.  Dear patient,  As a result of recent federal legislation (The Federal Cures Act), you may   receive lab or pathology results from your procedure in your [facility]   account before your physician is able to contact you. Your physician or   their representative will relay the results to you with their   recommendations at their soonest availability.  Thank you.  PROVATION

## 2022-10-19 VITALS
HEART RATE: 66 BPM | SYSTOLIC BLOOD PRESSURE: 99 MMHG | DIASTOLIC BLOOD PRESSURE: 61 MMHG | RESPIRATION RATE: 15 BRPM | TEMPERATURE: 97 F | OXYGEN SATURATION: 96 %

## 2022-10-21 ENCOUNTER — PATIENT MESSAGE (OUTPATIENT)
Dept: GASTROENTEROLOGY | Facility: CLINIC | Age: 67
End: 2022-10-21
Payer: MEDICARE

## 2022-10-21 ENCOUNTER — TELEPHONE (OUTPATIENT)
Dept: GASTROENTEROLOGY | Facility: CLINIC | Age: 67
End: 2022-10-21
Payer: MEDICARE

## 2022-10-21 NOTE — TELEPHONE ENCOUNTER
Returned call to the [facility], all questions and concerns answered, patient verbalized understanding of this.

## 2022-10-25 LAB
FINAL PATHOLOGIC DIAGNOSIS: NORMAL
GROSS: NORMAL
Lab: NORMAL

## 2022-11-08 DIAGNOSIS — I10 PRIMARY HYPERTENSION: ICD-10-CM

## 2022-11-08 DIAGNOSIS — E78.5 HYPERLIPIDEMIA, UNSPECIFIED HYPERLIPIDEMIA TYPE: Primary | ICD-10-CM

## 2022-11-10 RX ORDER — ASPIRIN 81 MG/1
81 TABLET ORAL DAILY
Qty: 90 TABLET | Refills: 0 | Status: SHIPPED | OUTPATIENT
Start: 2022-11-10 | End: 2024-02-01 | Stop reason: SDUPTHER

## 2022-12-20 ENCOUNTER — PATIENT MESSAGE (OUTPATIENT)
Dept: ADMINISTRATIVE | Facility: HOSPITAL | Age: 67
End: 2022-12-20
Payer: MEDICARE

## 2022-12-20 ENCOUNTER — PATIENT OUTREACH (OUTPATIENT)
Dept: ADMINISTRATIVE | Facility: HOSPITAL | Age: 67
End: 2022-12-20
Payer: MEDICARE

## 2022-12-20 NOTE — PROGRESS NOTES
2022 Care Everywhere updates requested and reviewed.  Immunizations reconciled. Media reports reviewed.  Duplicate HM overrides and  orders removed.  Overdue HM topic chart audit and/or requested.  Overdue lab testing linked to upcoming lab appointments if applies.  a1c scheduled 1.3.2022    Health Maintenance Due   Topic Date Due    Eye Exam  2022    Influenza Vaccine (1) Never done    Hemoglobin A1c  2022

## 2023-01-03 ENCOUNTER — LAB VISIT (OUTPATIENT)
Dept: FAMILY MEDICINE | Facility: CLINIC | Age: 68
End: 2023-01-03
Payer: MEDICARE

## 2023-01-03 DIAGNOSIS — Z12.5 SCREENING FOR MALIGNANT NEOPLASM OF PROSTATE: ICD-10-CM

## 2023-01-03 DIAGNOSIS — E11.69 TYPE 2 DIABETES MELLITUS WITH OTHER SPECIFIED COMPLICATION, WITHOUT LONG-TERM CURRENT USE OF INSULIN: ICD-10-CM

## 2023-01-03 DIAGNOSIS — E03.9 HYPOTHYROIDISM, UNSPECIFIED TYPE: ICD-10-CM

## 2023-01-03 DIAGNOSIS — M10.9 GOUT, UNSPECIFIED CAUSE, UNSPECIFIED CHRONICITY, UNSPECIFIED SITE: ICD-10-CM

## 2023-01-03 DIAGNOSIS — E78.5 HYPERLIPIDEMIA, UNSPECIFIED HYPERLIPIDEMIA TYPE: ICD-10-CM

## 2023-01-03 PROCEDURE — 80061 LIPID PANEL: CPT | Performed by: PHYSICIAN ASSISTANT

## 2023-01-03 PROCEDURE — 80053 COMPREHEN METABOLIC PANEL: CPT | Performed by: PHYSICIAN ASSISTANT

## 2023-01-03 PROCEDURE — 85025 COMPLETE CBC W/AUTO DIFF WBC: CPT | Performed by: PHYSICIAN ASSISTANT

## 2023-01-03 PROCEDURE — 84153 ASSAY OF PSA TOTAL: CPT | Performed by: PHYSICIAN ASSISTANT

## 2023-01-03 PROCEDURE — 83036 HEMOGLOBIN GLYCOSYLATED A1C: CPT | Performed by: PHYSICIAN ASSISTANT

## 2023-01-03 PROCEDURE — 36415 COLL VENOUS BLD VENIPUNCTURE: CPT | Performed by: PHYSICIAN ASSISTANT

## 2023-01-03 PROCEDURE — 84550 ASSAY OF BLOOD/URIC ACID: CPT | Performed by: PHYSICIAN ASSISTANT

## 2023-01-03 PROCEDURE — 84443 ASSAY THYROID STIM HORMONE: CPT | Performed by: PHYSICIAN ASSISTANT

## 2023-01-03 PROCEDURE — 36415 PR COLLECTION VENOUS BLOOD,VENIPUNCTURE: ICD-10-PCS | Mod: S$GLB,,, | Performed by: PHYSICIAN ASSISTANT

## 2023-01-03 PROCEDURE — 36415 COLL VENOUS BLD VENIPUNCTURE: CPT | Mod: S$GLB,,, | Performed by: PHYSICIAN ASSISTANT

## 2023-01-04 LAB
ALBUMIN SERPL BCP-MCNC: 3.9 G/DL (ref 3.5–5.2)
ALP SERPL-CCNC: 58 U/L (ref 55–135)
ALT SERPL W/O P-5'-P-CCNC: 31 U/L (ref 10–44)
ANION GAP SERPL CALC-SCNC: 8 MMOL/L (ref 8–16)
AST SERPL-CCNC: 25 U/L (ref 10–40)
BASOPHILS # BLD AUTO: 0.03 K/UL (ref 0–0.2)
BASOPHILS NFR BLD: 0.4 % (ref 0–1.9)
BILIRUB SERPL-MCNC: 0.7 MG/DL (ref 0.1–1)
BUN SERPL-MCNC: 22 MG/DL (ref 8–23)
CALCIUM SERPL-MCNC: 9.4 MG/DL (ref 8.7–10.5)
CHLORIDE SERPL-SCNC: 108 MMOL/L (ref 95–110)
CHOLEST SERPL-MCNC: 101 MG/DL (ref 120–199)
CHOLEST/HDLC SERPL: 3.5 {RATIO} (ref 2–5)
CO2 SERPL-SCNC: 22 MMOL/L (ref 23–29)
COMPLEXED PSA SERPL-MCNC: 0.66 NG/ML (ref 0–4)
CREAT SERPL-MCNC: 1.5 MG/DL (ref 0.5–1.4)
DIFFERENTIAL METHOD: ABNORMAL
EOSINOPHIL # BLD AUTO: 0.5 K/UL (ref 0–0.5)
EOSINOPHIL NFR BLD: 6.7 % (ref 0–8)
ERYTHROCYTE [DISTWIDTH] IN BLOOD BY AUTOMATED COUNT: 12.9 % (ref 11.5–14.5)
EST. GFR  (NO RACE VARIABLE): 50.1 ML/MIN/1.73 M^2
ESTIMATED AVG GLUCOSE: 123 MG/DL (ref 68–131)
GLUCOSE SERPL-MCNC: 91 MG/DL (ref 70–110)
HBA1C MFR BLD: 5.9 % (ref 4–5.6)
HCT VFR BLD AUTO: 40.7 % (ref 40–54)
HDLC SERPL-MCNC: 29 MG/DL (ref 40–75)
HDLC SERPL: 28.7 % (ref 20–50)
HGB BLD-MCNC: 13.1 G/DL (ref 14–18)
IMM GRANULOCYTES # BLD AUTO: 0.01 K/UL (ref 0–0.04)
IMM GRANULOCYTES NFR BLD AUTO: 0.1 % (ref 0–0.5)
LDLC SERPL CALC-MCNC: 42 MG/DL (ref 63–159)
LYMPHOCYTES # BLD AUTO: 2.3 K/UL (ref 1–4.8)
LYMPHOCYTES NFR BLD: 34.7 % (ref 18–48)
MCH RBC QN AUTO: 31.6 PG (ref 27–31)
MCHC RBC AUTO-ENTMCNC: 32.2 G/DL (ref 32–36)
MCV RBC AUTO: 98 FL (ref 82–98)
MONOCYTES # BLD AUTO: 0.7 K/UL (ref 0.3–1)
MONOCYTES NFR BLD: 10.3 % (ref 4–15)
NEUTROPHILS # BLD AUTO: 3.2 K/UL (ref 1.8–7.7)
NEUTROPHILS NFR BLD: 47.8 % (ref 38–73)
NONHDLC SERPL-MCNC: 72 MG/DL
NRBC BLD-RTO: 0 /100 WBC
PLATELET # BLD AUTO: 245 K/UL (ref 150–450)
PMV BLD AUTO: 11 FL (ref 9.2–12.9)
POTASSIUM SERPL-SCNC: 4.2 MMOL/L (ref 3.5–5.1)
PROT SERPL-MCNC: 7.7 G/DL (ref 6–8.4)
RBC # BLD AUTO: 4.15 M/UL (ref 4.6–6.2)
SODIUM SERPL-SCNC: 138 MMOL/L (ref 136–145)
TRIGL SERPL-MCNC: 150 MG/DL (ref 30–150)
TSH SERPL DL<=0.005 MIU/L-ACNC: 3.01 UIU/ML (ref 0.4–4)
URATE SERPL-MCNC: 8.1 MG/DL (ref 3.4–7)
WBC # BLD AUTO: 6.72 K/UL (ref 3.9–12.7)

## 2023-01-10 ENCOUNTER — OFFICE VISIT (OUTPATIENT)
Dept: FAMILY MEDICINE | Facility: CLINIC | Age: 68
End: 2023-01-10
Payer: MEDICARE

## 2023-01-10 VITALS
BODY MASS INDEX: 38.22 KG/M2 | RESPIRATION RATE: 18 BRPM | WEIGHT: 274.06 LBS | HEART RATE: 76 BPM | SYSTOLIC BLOOD PRESSURE: 115 MMHG | DIASTOLIC BLOOD PRESSURE: 69 MMHG | OXYGEN SATURATION: 97 % | TEMPERATURE: 99 F

## 2023-01-10 DIAGNOSIS — D53.1 OTHER MEGALOBLASTIC ANEMIAS, NOT ELSEWHERE CLASSIFIED: ICD-10-CM

## 2023-01-10 DIAGNOSIS — E03.9 HYPOTHYROIDISM, UNSPECIFIED TYPE: ICD-10-CM

## 2023-01-10 DIAGNOSIS — J02.9 ACUTE SORE THROAT: ICD-10-CM

## 2023-01-10 DIAGNOSIS — N18.30 TYPE 2 DM WITH CKD STAGE 3 AND HYPERTENSION: ICD-10-CM

## 2023-01-10 DIAGNOSIS — D64.9 ANEMIA, UNSPECIFIED TYPE: ICD-10-CM

## 2023-01-10 DIAGNOSIS — N17.9 ACUTE RENAL FAILURE, UNSPECIFIED ACUTE RENAL FAILURE TYPE: Primary | ICD-10-CM

## 2023-01-10 DIAGNOSIS — E11.22 TYPE 2 DM WITH CKD STAGE 3 AND HYPERTENSION: ICD-10-CM

## 2023-01-10 DIAGNOSIS — I12.9 TYPE 2 DM WITH CKD STAGE 3 AND HYPERTENSION: ICD-10-CM

## 2023-01-10 PROCEDURE — 1159F PR MEDICATION LIST DOCUMENTED IN MEDICAL RECORD: ICD-10-PCS | Mod: CPTII,S$GLB,, | Performed by: PHYSICIAN ASSISTANT

## 2023-01-10 PROCEDURE — 3288F FALL RISK ASSESSMENT DOCD: CPT | Mod: CPTII,S$GLB,, | Performed by: PHYSICIAN ASSISTANT

## 2023-01-10 PROCEDURE — 3044F HG A1C LEVEL LT 7.0%: CPT | Mod: CPTII,S$GLB,, | Performed by: PHYSICIAN ASSISTANT

## 2023-01-10 PROCEDURE — 3044F PR MOST RECENT HEMOGLOBIN A1C LEVEL <7.0%: ICD-10-PCS | Mod: CPTII,S$GLB,, | Performed by: PHYSICIAN ASSISTANT

## 2023-01-10 PROCEDURE — 99499 UNLISTED E&M SERVICE: CPT | Mod: S$GLB,,, | Performed by: PHYSICIAN ASSISTANT

## 2023-01-10 PROCEDURE — 1159F MED LIST DOCD IN RCRD: CPT | Mod: CPTII,S$GLB,, | Performed by: PHYSICIAN ASSISTANT

## 2023-01-10 PROCEDURE — 99499 RISK ADDL DX/OHS AUDIT: ICD-10-PCS | Mod: S$GLB,,, | Performed by: PHYSICIAN ASSISTANT

## 2023-01-10 PROCEDURE — 3078F DIAST BP <80 MM HG: CPT | Mod: CPTII,S$GLB,, | Performed by: PHYSICIAN ASSISTANT

## 2023-01-10 PROCEDURE — 99214 OFFICE O/P EST MOD 30 MIN: CPT | Mod: S$GLB,,, | Performed by: PHYSICIAN ASSISTANT

## 2023-01-10 PROCEDURE — 3288F PR FALLS RISK ASSESSMENT DOCUMENTED: ICD-10-PCS | Mod: CPTII,S$GLB,, | Performed by: PHYSICIAN ASSISTANT

## 2023-01-10 PROCEDURE — 1101F PT FALLS ASSESS-DOCD LE1/YR: CPT | Mod: CPTII,S$GLB,, | Performed by: PHYSICIAN ASSISTANT

## 2023-01-10 PROCEDURE — 99214 PR OFFICE/OUTPT VISIT, EST, LEVL IV, 30-39 MIN: ICD-10-PCS | Mod: S$GLB,,, | Performed by: PHYSICIAN ASSISTANT

## 2023-01-10 PROCEDURE — 3074F SYST BP LT 130 MM HG: CPT | Mod: CPTII,S$GLB,, | Performed by: PHYSICIAN ASSISTANT

## 2023-01-10 PROCEDURE — 3074F PR MOST RECENT SYSTOLIC BLOOD PRESSURE < 130 MM HG: ICD-10-PCS | Mod: CPTII,S$GLB,, | Performed by: PHYSICIAN ASSISTANT

## 2023-01-10 PROCEDURE — 3008F BODY MASS INDEX DOCD: CPT | Mod: CPTII,S$GLB,, | Performed by: PHYSICIAN ASSISTANT

## 2023-01-10 PROCEDURE — 3008F PR BODY MASS INDEX (BMI) DOCUMENTED: ICD-10-PCS | Mod: CPTII,S$GLB,, | Performed by: PHYSICIAN ASSISTANT

## 2023-01-10 PROCEDURE — 1101F PR PT FALLS ASSESS DOC 0-1 FALLS W/OUT INJ PAST YR: ICD-10-PCS | Mod: CPTII,S$GLB,, | Performed by: PHYSICIAN ASSISTANT

## 2023-01-10 PROCEDURE — 3078F PR MOST RECENT DIASTOLIC BLOOD PRESSURE < 80 MM HG: ICD-10-PCS | Mod: CPTII,S$GLB,, | Performed by: PHYSICIAN ASSISTANT

## 2023-01-10 RX ORDER — LEVOTHYROXINE SODIUM 137 UG/1
137 TABLET ORAL
Qty: 90 TABLET | Refills: 3 | Status: SHIPPED | OUTPATIENT
Start: 2023-01-10 | End: 2024-02-01

## 2023-01-10 RX ORDER — LEVOCETIRIZINE DIHYDROCHLORIDE 5 MG/1
TABLET, FILM COATED ORAL
Qty: 90 TABLET | Refills: 3 | Status: SHIPPED | OUTPATIENT
Start: 2023-01-10 | End: 2024-01-04

## 2023-01-10 NOTE — PROGRESS NOTES
Subjective:       Patient ID: Asim Wilson is a 67 y.o. male.    Chief Complaint: Follow-up    Diabetes  He presents for his initial diabetic visit. He has type 2 diabetes mellitus. His disease course has been stable. There are no hypoglycemic associated symptoms. Pertinent negatives for diabetes include no chest pain and no fatigue. There are no hypoglycemic complications. There are no diabetic complications. Risk factors for coronary artery disease include diabetes mellitus, dyslipidemia, family history, hypertension, male sex and sedentary lifestyle. Current diabetic treatment includes oral agent (monotherapy). He is compliant with treatment most of the time. His weight is fluctuating minimally. He is following a generally unhealthy diet. Meal planning includes avoidance of concentrated sweets. He never participates in exercise. His home blood glucose trend is fluctuating minimally. An ACE inhibitor/angiotensin II receptor blocker is being taken. He does not see a podiatrist.Eye exam is not current.   Review of Systems   Constitutional:  Negative for activity change, appetite change, fatigue and fever.   Respiratory:  Negative for chest tightness, shortness of breath and wheezing.    Cardiovascular:  Negative for chest pain.   Gastrointestinal:  Negative for abdominal pain.       Past Medical History:   Diagnosis Date    Arthritis     Asthma     Cataract     Done OU    Diabetic retinopathy     Gout attack     Hypertension     GERONIMO (obstructive sleep apnea)     noncompliant CPAP    Thyroid disease        Objective:      Physical Exam  Constitutional:       General: He is not in acute distress.     Appearance: Normal appearance. He is not ill-appearing, toxic-appearing or diaphoretic.   Cardiovascular:      Rate and Rhythm: Normal rate and regular rhythm.      Pulses: Normal pulses.      Heart sounds: Normal heart sounds. No murmur heard.    No friction rub. No gallop.   Pulmonary:      Effort: Pulmonary effort  is normal. No respiratory distress.      Breath sounds: Normal breath sounds. No stridor. No wheezing, rhonchi or rales.   Chest:      Chest wall: No tenderness.   Abdominal:      Palpations: Abdomen is soft.      Tenderness: There is no abdominal tenderness.   Neurological:      Mental Status: He is alert.       Lab Results   Component Value Date    WBC 6.72 01/03/2023    HGB 13.1 (L) 01/03/2023    HCT 40.7 01/03/2023    MCV 98 01/03/2023     01/03/2023     CMP  Sodium   Date Value Ref Range Status   01/03/2023 138 136 - 145 mmol/L Final     Potassium   Date Value Ref Range Status   01/03/2023 4.2 3.5 - 5.1 mmol/L Final     Chloride   Date Value Ref Range Status   01/03/2023 108 95 - 110 mmol/L Final     CO2   Date Value Ref Range Status   01/03/2023 22 (L) 23 - 29 mmol/L Final     Glucose   Date Value Ref Range Status   01/03/2023 91 70 - 110 mg/dL Final     BUN   Date Value Ref Range Status   01/03/2023 22 8 - 23 mg/dL Final     Creatinine   Date Value Ref Range Status   01/03/2023 1.5 (H) 0.5 - 1.4 mg/dL Final     Calcium   Date Value Ref Range Status   01/03/2023 9.4 8.7 - 10.5 mg/dL Final     Total Protein   Date Value Ref Range Status   01/03/2023 7.7 6.0 - 8.4 g/dL Final     Albumin   Date Value Ref Range Status   01/03/2023 3.9 3.5 - 5.2 g/dL Final   10/14/2020 3.9 3.6 - 5.1 g/dL Final     Comment:     For additional information, please refer to   http://education.[employer].reBounces/faq/UQI462 (This link is   being provided for informational/ educational purposes only.)  This test was developed and its analytical performance   characteristics have been determined by [employer]. It has not been cleared or approved by the   [place] Food and Drug Administration. This assay has been validated   pursuant to the CLIA regulations and is used for clinical   purposes.  @ Test Performed By:  [employer] [facility]  Angelo Moreau M.D.,   [id number]  [place] [address]  [facility]  [id number]       Total Bilirubin   Date Value Ref Range Status   01/03/2023 0.7 0.1 - 1.0 mg/dL Final     Comment:     For infants and newborns, interpretation of results should be based  on gestational age, weight and in agreement with clinical  observations.    Premature Infant recommended reference ranges:  Up to 24 hours.............<8.0 mg/dL  Up to 48 hours............<12.0 mg/dL  3-5 days..................<15.0 mg/dL  6-29 days.................<15.0 mg/dL       Alkaline Phosphatase   Date Value Ref Range Status   01/03/2023 58 55 - 135 U/L Final     AST   Date Value Ref Range Status   01/03/2023 25 10 - 40 U/L Final     ALT   Date Value Ref Range Status   01/03/2023 31 10 - 44 U/L Final     Anion Gap   Date Value Ref Range Status   01/03/2023 8 8 - 16 mmol/L Final     eGFR if    Date Value Ref Range Status   06/23/2022 59.3 (A) >60 mL/min/1.73 m^2 Final     eGFR if non    Date Value Ref Range Status   06/23/2022 51.3 (A) >60 mL/min/1.73 m^2 Final     Comment:     Calculation used to obtain the estimated glomerular filtration  rate (eGFR) is the CKD-EPI equation.        Lab Results   Component Value Date    CHOL 101 (L) 01/03/2023     Lab Results   Component Value Date    HDL 29 (L) 01/03/2023     Lab Results   Component Value Date    LDLCALC 42.0 (L) 01/03/2023     Lab Results   Component Value Date    TRIG 150 01/03/2023     Lab Results   Component Value Date    CHOLHDL 28.7 01/03/2023     Lab Results   Component Value Date    HGBA1C 5.9 (H) 01/03/2023      Assessment:       Problem List Items Addressed This Visit       Type 2 DM with CKD stage 3 and hypertension    Relevant Orders    Ambulatory referral/consult to Optometry    Hypothyroidism    Relevant Medications    levothyroxine (SYNTHROID) 137 MCG Tab tablet     Other Visit Diagnoses       Acute renal failure, unspecified acute renal failure type    -  Primary    Relevant  Orders    Comprehensive Metabolic Panel    Anemia, unspecified type        Relevant Orders    Vitamin B12    Ferritin    Iron and TIBC    Other megaloblastic anemias, not elsewhere classified        Relevant Orders    Vitamin B12              Plan:       Acute renal failure, unspecified acute renal failure type  Avoid NSAIDS, increase water  - one month    Comprehensive Metabolic Panel; Future; Expected date: 02/10/2023  Consider stopping metformin    Anemia, unspecified type  -     Vitamin B12; Future; Expected date: 02/10/2023  -     Ferritin; Future; Expected date: 02/10/2023  -     Iron and TIBC; Future; Expected date: 02/10/2023    Hypothyroidism, unspecified type  -     levothyroxine (SYNTHROID) 137 MCG Tab tablet; Take 1 tablet (137 mcg total) by mouth before breakfast.  Dispense: 90 tablet; Refill: 3    Other megaloblastic anemias, not elsewhere classified  -     Vitamin B12; Future; Expected date: 02/10/2023    Type 2 DM with CKD stage 3 and hypertension  -     Ambulatory referral/consult to Optometry; Future; Expected date: 01/17/2023         The patient is asked to make an attempt to improve diet and exercise patterns to aid in medical management of this problem.   I spent 30 minutes on this encounter, time includes face-to-face, chart review, documentation, test review and orders.

## 2023-02-10 ENCOUNTER — LAB VISIT (OUTPATIENT)
Dept: FAMILY MEDICINE | Facility: CLINIC | Age: 68
End: 2023-02-10
Payer: MEDICARE

## 2023-02-10 DIAGNOSIS — E11.9 TYPE 2 DIABETES MELLITUS WITHOUT COMPLICATION, WITHOUT LONG-TERM CURRENT USE OF INSULIN: Primary | ICD-10-CM

## 2023-02-10 DIAGNOSIS — D53.1 OTHER MEGALOBLASTIC ANEMIAS, NOT ELSEWHERE CLASSIFIED: ICD-10-CM

## 2023-02-10 DIAGNOSIS — D64.9 ANEMIA, UNSPECIFIED TYPE: ICD-10-CM

## 2023-02-10 DIAGNOSIS — N17.9 ACUTE RENAL FAILURE, UNSPECIFIED ACUTE RENAL FAILURE TYPE: ICD-10-CM

## 2023-02-10 DIAGNOSIS — E11.9 TYPE 2 DIABETES MELLITUS WITHOUT COMPLICATION: ICD-10-CM

## 2023-02-10 LAB
ALBUMIN SERPL BCP-MCNC: 3.7 G/DL (ref 3.5–5.2)
ALP SERPL-CCNC: 60 U/L (ref 55–135)
ALT SERPL W/O P-5'-P-CCNC: 33 U/L (ref 10–44)
ANION GAP SERPL CALC-SCNC: 10 MMOL/L (ref 8–16)
AST SERPL-CCNC: 25 U/L (ref 10–40)
BILIRUB SERPL-MCNC: 0.4 MG/DL (ref 0.1–1)
BUN SERPL-MCNC: 23 MG/DL (ref 8–23)
CALCIUM SERPL-MCNC: 9.6 MG/DL (ref 8.7–10.5)
CHLORIDE SERPL-SCNC: 107 MMOL/L (ref 95–110)
CO2 SERPL-SCNC: 23 MMOL/L (ref 23–29)
CREAT SERPL-MCNC: 1.4 MG/DL (ref 0.5–1.4)
EST. GFR  (NO RACE VARIABLE): 54.4 ML/MIN/1.73 M^2
ESTIMATED AVG GLUCOSE: 117 MG/DL (ref 68–131)
FERRITIN SERPL-MCNC: 132 NG/ML (ref 20–300)
GLUCOSE SERPL-MCNC: 97 MG/DL (ref 70–110)
HBA1C MFR BLD: 5.7 % (ref 4–5.6)
IRON SERPL-MCNC: 87 UG/DL (ref 45–160)
POTASSIUM SERPL-SCNC: 4.4 MMOL/L (ref 3.5–5.1)
PROT SERPL-MCNC: 7.4 G/DL (ref 6–8.4)
SATURATED IRON: 28 % (ref 20–50)
SODIUM SERPL-SCNC: 140 MMOL/L (ref 136–145)
TOTAL IRON BINDING CAPACITY: 314 UG/DL (ref 250–450)
TRANSFERRIN SERPL-MCNC: 212 MG/DL (ref 200–375)
VIT B12 SERPL-MCNC: 269 PG/ML (ref 210–950)

## 2023-02-10 PROCEDURE — 80053 COMPREHEN METABOLIC PANEL: CPT | Performed by: PHYSICIAN ASSISTANT

## 2023-02-10 PROCEDURE — 82607 VITAMIN B-12: CPT | Performed by: PHYSICIAN ASSISTANT

## 2023-02-10 PROCEDURE — 84466 ASSAY OF TRANSFERRIN: CPT | Performed by: PHYSICIAN ASSISTANT

## 2023-02-10 PROCEDURE — 83036 HEMOGLOBIN GLYCOSYLATED A1C: CPT | Performed by: PHYSICIAN ASSISTANT

## 2023-02-10 PROCEDURE — 82728 ASSAY OF FERRITIN: CPT | Performed by: PHYSICIAN ASSISTANT

## 2023-04-11 ENCOUNTER — PATIENT MESSAGE (OUTPATIENT)
Dept: ADMINISTRATIVE | Facility: HOSPITAL | Age: 68
End: 2023-04-11
Payer: MEDICARE

## 2023-05-08 RX ORDER — LISINOPRIL AND HYDROCHLOROTHIAZIDE 20; 25 MG/1; MG/1
TABLET ORAL
Qty: 90 TABLET | Refills: 0 | Status: SHIPPED | OUTPATIENT
Start: 2023-05-08 | End: 2023-08-04

## 2023-05-08 NOTE — TELEPHONE ENCOUNTER
Refill Routing Note   Medication(s) are not appropriate for processing by [facility] for the following reason(s):      Non-participating provider    [facility] action(s):  Route None identified          Appointments  past 12m or future 3m with PCP    Date Provider   Last Visit   1/10/2023 Naren Guy III, PA-C   Next Visit   Visit date not found Naren Guy III, PA-C   ED visits in past 90 days: 0        Note composed:7:22 AM 05/08/2023

## 2023-05-16 ENCOUNTER — OFFICE VISIT (OUTPATIENT)
Dept: PRIMARY CARE CLINIC | Facility: CLINIC | Age: 68
End: 2023-05-16
Payer: MEDICARE

## 2023-05-16 VITALS
HEART RATE: 67 BPM | WEIGHT: 271.81 LBS | RESPIRATION RATE: 18 BRPM | OXYGEN SATURATION: 95 % | DIASTOLIC BLOOD PRESSURE: 60 MMHG | TEMPERATURE: 97 F | BODY MASS INDEX: 37.91 KG/M2 | SYSTOLIC BLOOD PRESSURE: 118 MMHG

## 2023-05-16 DIAGNOSIS — M16.0 BILATERAL PRIMARY OSTEOARTHRITIS OF HIP: ICD-10-CM

## 2023-05-16 DIAGNOSIS — R10.31 RIGHT INGUINAL PAIN: Primary | ICD-10-CM

## 2023-05-16 PROCEDURE — 99214 OFFICE O/P EST MOD 30 MIN: CPT | Mod: S$GLB,,, | Performed by: PHYSICIAN ASSISTANT

## 2023-05-16 PROCEDURE — 3044F HG A1C LEVEL LT 7.0%: CPT | Mod: CPTII,S$GLB,, | Performed by: PHYSICIAN ASSISTANT

## 2023-05-16 PROCEDURE — 3288F PR FALLS RISK ASSESSMENT DOCUMENTED: ICD-10-PCS | Mod: CPTII,S$GLB,, | Performed by: PHYSICIAN ASSISTANT

## 2023-05-16 PROCEDURE — 3078F DIAST BP <80 MM HG: CPT | Mod: CPTII,S$GLB,, | Performed by: PHYSICIAN ASSISTANT

## 2023-05-16 PROCEDURE — 99214 PR OFFICE/OUTPT VISIT, EST, LEVL IV, 30-39 MIN: ICD-10-PCS | Mod: S$GLB,,, | Performed by: PHYSICIAN ASSISTANT

## 2023-05-16 PROCEDURE — 3074F PR MOST RECENT SYSTOLIC BLOOD PRESSURE < 130 MM HG: ICD-10-PCS | Mod: CPTII,S$GLB,, | Performed by: PHYSICIAN ASSISTANT

## 2023-05-16 PROCEDURE — 3074F SYST BP LT 130 MM HG: CPT | Mod: CPTII,S$GLB,, | Performed by: PHYSICIAN ASSISTANT

## 2023-05-16 PROCEDURE — 3078F PR MOST RECENT DIASTOLIC BLOOD PRESSURE < 80 MM HG: ICD-10-PCS | Mod: CPTII,S$GLB,, | Performed by: PHYSICIAN ASSISTANT

## 2023-05-16 PROCEDURE — 1125F PR PAIN SEVERITY QUANTIFIED, PAIN PRESENT: ICD-10-PCS | Mod: CPTII,S$GLB,, | Performed by: PHYSICIAN ASSISTANT

## 2023-05-16 PROCEDURE — 1101F PR PT FALLS ASSESS DOC 0-1 FALLS W/OUT INJ PAST YR: ICD-10-PCS | Mod: CPTII,S$GLB,, | Performed by: PHYSICIAN ASSISTANT

## 2023-05-16 PROCEDURE — 4010F ACE/ARB THERAPY RXD/TAKEN: CPT | Mod: CPTII,S$GLB,, | Performed by: PHYSICIAN ASSISTANT

## 2023-05-16 PROCEDURE — 3008F BODY MASS INDEX DOCD: CPT | Mod: CPTII,S$GLB,, | Performed by: PHYSICIAN ASSISTANT

## 2023-05-16 PROCEDURE — 1125F AMNT PAIN NOTED PAIN PRSNT: CPT | Mod: CPTII,S$GLB,, | Performed by: PHYSICIAN ASSISTANT

## 2023-05-16 PROCEDURE — 3008F PR BODY MASS INDEX (BMI) DOCUMENTED: ICD-10-PCS | Mod: CPTII,S$GLB,, | Performed by: PHYSICIAN ASSISTANT

## 2023-05-16 PROCEDURE — 4010F PR ACE/ARB THEARPY RXD/TAKEN: ICD-10-PCS | Mod: CPTII,S$GLB,, | Performed by: PHYSICIAN ASSISTANT

## 2023-05-16 PROCEDURE — 1101F PT FALLS ASSESS-DOCD LE1/YR: CPT | Mod: CPTII,S$GLB,, | Performed by: PHYSICIAN ASSISTANT

## 2023-05-16 PROCEDURE — 3044F PR MOST RECENT HEMOGLOBIN A1C LEVEL <7.0%: ICD-10-PCS | Mod: CPTII,S$GLB,, | Performed by: PHYSICIAN ASSISTANT

## 2023-05-16 PROCEDURE — 3288F FALL RISK ASSESSMENT DOCD: CPT | Mod: CPTII,S$GLB,, | Performed by: PHYSICIAN ASSISTANT

## 2023-05-16 RX ORDER — HYDROCODONE BITARTRATE AND ACETAMINOPHEN 7.5; 325 MG/1; MG/1
1 TABLET ORAL EVERY 6 HOURS PRN
Qty: 21 TABLET | Refills: 0 | Status: SHIPPED | OUTPATIENT
Start: 2023-05-16 | End: 2023-09-11

## 2023-05-16 RX ORDER — COLCHICINE 0.6 MG/1
0.6 TABLET ORAL 2 TIMES DAILY
Qty: 60 TABLET | Refills: 11 | Status: SHIPPED | OUTPATIENT
Start: 2023-05-16 | End: 2023-09-11 | Stop reason: SDUPTHER

## 2023-05-16 NOTE — PROGRESS NOTES
Subjective     Patient ID: Asim Wilson is a 67 y.o. male.    Chief Complaint: Groin Pain    Groin Pain  The patient's primary symptoms include pelvic pain. This is a recurrent problem. The current episode started in the past 7 days. The problem occurs constantly. The problem has been waxing and waning. The pain is moderate. Pertinent negatives include no abdominal pain, chest pain, chills, fever or shortness of breath. Associated symptoms comments: Right groin pain with activity. . The symptoms are aggravated by activity. Treatments tried: NSAIDS. The treatment provided moderate relief.   Review of Systems   Constitutional:  Positive for activity change. Negative for chills, fatigue and fever.   Respiratory:  Negative for chest tightness and shortness of breath.    Cardiovascular:  Negative for chest pain.   Gastrointestinal:  Negative for abdominal pain.   Genitourinary:  Positive for pelvic pain.   Past Medical History:   Diagnosis Date    Arthritis     Asthma     Cataract     Done OU    Diabetic retinopathy     Gout attack     Hypertension     GERONIMO (obstructive sleep apnea)     noncompliant CPAP    Thyroid disease    ckd3       Objective     Physical Exam  Vitals reviewed.   Constitutional:       General: He is not in acute distress.     Appearance: Normal appearance. He is not ill-appearing, toxic-appearing or diaphoretic.   Cardiovascular:      Rate and Rhythm: Normal rate and regular rhythm.      Pulses: Normal pulses.      Heart sounds: Normal heart sounds.   Pulmonary:      Effort: Pulmonary effort is normal. No respiratory distress.      Breath sounds: Normal breath sounds. No stridor. No wheezing, rhonchi or rales.   Chest:      Chest wall: No tenderness.   Abdominal:      Palpations: Abdomen is soft.      Tenderness: There is no abdominal tenderness.   Musculoskeletal:      Right hip: Tenderness present.        Legs:    Neurological:      Mental Status: He is alert.          Assessment and Plan      Problem List Items Addressed This Visit    None  Visit Diagnoses       Right inguinal pain    -  Primary    Relevant Orders    X-Ray Hip 2 or 3 views Right (with Pelvis when performed)    US Soft Tissue, Groin Right    Bilateral primary osteoarthritis of hip        Relevant Orders    US Soft Tissue, Groin Right            Right inguinal pain  -     X-Ray Hip 2 or 3 views Right (with Pelvis when performed); Future; Expected date: 05/16/2023  -     US Soft Tissue, Groin Right; Future; Expected date: 05/16/2023    Bilateral primary osteoarthritis of hip  -     US Soft Tissue, Groin Right; Future; Expected date: 05/16/2023    Other orders  -     HYDROcodone-acetaminophen (NORCO) 7.5-325 mg per tablet; Take 1 tablet by mouth every 6 (six) hours as needed for Pain.  Dispense: 21 tablet; Refill: 0  -     colchicine (COLCRYS) 0.6 mg tablet; Take 1 tablet (0.6 mg total) by mouth 2 (two) times daily.  Dispense: 60 tablet; Refill: 11     I spent 30 minutes on this encounter, time includes face-to-face, chart review, documentation, test review and orders.

## 2023-05-17 ENCOUNTER — TELEPHONE (OUTPATIENT)
Dept: FAMILY MEDICINE | Facility: CLINIC | Age: 68
End: 2023-05-17
Payer: MEDICARE

## 2023-05-17 NOTE — TELEPHONE ENCOUNTER
----- Message from Nelia Roldan sent at 5/17/2023  3:06 PM CDT -----  Contact: zohra Ashley  Type:  Needs Medical Advice    Who Called: zohra Ashley  Would the patient rather a call back or a response via [facility]? call  Best Call Back Number: [phone] (home)     Additional Information: pt sts pain medicine, HYDROcodone-acetaminophen (NORCO) 7.5-325 mg per tablet is not not working and wants nurse to call him and discuss other options. Please advise and thank you.

## 2023-05-17 NOTE — TELEPHONE ENCOUNTER
Spoke with patient, stated he has taken 4 since yesterday when it was prescribed and it is not working. He wants to know if there is anything else you can prescribe. Please advise

## 2023-05-18 RX ORDER — PREDNISONE 20 MG/1
20 TABLET ORAL DAILY
Qty: 7 TABLET | Refills: 0 | Status: SHIPPED | OUTPATIENT
Start: 2023-05-18 | End: 2023-05-25

## 2023-05-23 ENCOUNTER — HOSPITAL ENCOUNTER (OUTPATIENT)
Dept: RADIOLOGY | Facility: HOSPITAL | Age: 68
Discharge: HOME | End: 2023-05-23
Attending: PHYSICIAN ASSISTANT
Payer: MEDICARE

## 2023-05-23 DIAGNOSIS — M16.0 BILATERAL PRIMARY OSTEOARTHRITIS OF HIP: ICD-10-CM

## 2023-05-23 DIAGNOSIS — R10.31 RIGHT INGUINAL PAIN: ICD-10-CM

## 2023-05-23 PROCEDURE — 73502 XR HIP WITH PELVIS WHEN PERFORMED, 2 OR 3  VIEWS RIGHT: ICD-10-PCS | Mod: 26,RT,, | Performed by: RADIOLOGY

## 2023-05-23 PROCEDURE — 73502 X-RAY EXAM HIP UNI 2-3 VIEWS: CPT | Mod: TC,FY,PO,RT

## 2023-05-23 PROCEDURE — 76882 US SOFT TISSUE, GROIN RIGHT: ICD-10-PCS | Mod: 26,RT,, | Performed by: RADIOLOGY

## 2023-05-23 PROCEDURE — 76882 US LMTD JT/FCL EVL NVASC XTR: CPT | Mod: TC,PO,RT

## 2023-05-23 PROCEDURE — 76882 US LMTD JT/FCL EVL NVASC XTR: CPT | Mod: 26,RT,, | Performed by: RADIOLOGY

## 2023-05-23 PROCEDURE — 73502 X-RAY EXAM HIP UNI 2-3 VIEWS: CPT | Mod: 26,RT,, | Performed by: RADIOLOGY

## 2023-05-23 RX ORDER — CLINDAMYCIN HYDROCHLORIDE 300 MG/1
300 CAPSULE ORAL 3 TIMES DAILY
Qty: 30 CAPSULE | Refills: 0 | Status: SHIPPED | OUTPATIENT
Start: 2023-05-23 | End: 2023-06-02

## 2023-06-23 ENCOUNTER — PES CALL (OUTPATIENT)
Dept: ADMINISTRATIVE | Facility: CLINIC | Age: 68
End: 2023-06-23
Payer: MEDICARE

## 2023-07-01 ENCOUNTER — APPOINTMENT (OUTPATIENT)
Dept: RADIOLOGY | Facility: HOSPITAL | Age: 68
DRG: 372 | End: 2023-07-01
Payer: MEDICARE

## 2023-07-01 ENCOUNTER — HOSPITAL ENCOUNTER (INPATIENT)
Facility: HOSPITAL | Age: 68
LOS: 2 days | Discharge: HOME | DRG: 372 | End: 2023-07-04
Attending: EMERGENCY MEDICINE | Admitting: INTERNAL MEDICINE
Payer: MEDICARE

## 2023-07-01 DIAGNOSIS — R07.9 CHEST PAIN: ICD-10-CM

## 2023-07-01 DIAGNOSIS — E87.1 HYPONATREMIA: ICD-10-CM

## 2023-07-01 DIAGNOSIS — S90.122A TRAUMATIC ECCHYMOSIS OF TOE OF LEFT FOOT, INITIAL ENCOUNTER: ICD-10-CM

## 2023-07-01 DIAGNOSIS — A04.72 C. DIFFICILE COLITIS: ICD-10-CM

## 2023-07-01 DIAGNOSIS — S90.112A CONTUSION OF LEFT GREAT TOE WITHOUT DAMAGE TO NAIL, INITIAL ENCOUNTER: ICD-10-CM

## 2023-07-01 DIAGNOSIS — R79.89 ELEVATED TROPONIN: ICD-10-CM

## 2023-07-01 DIAGNOSIS — N17.9 AKI (ACUTE KIDNEY INJURY) (CMS/HCC): Primary | ICD-10-CM

## 2023-07-01 PROBLEM — N18.31 ACUTE RENAL FAILURE SUPERIMPOSED ON STAGE 3A CHRONIC KIDNEY DISEASE (CMS/HCC): Status: ACTIVE | Noted: 2023-07-01

## 2023-07-01 PROBLEM — E11.22 TYPE 2 DIABETES MELLITUS WITH STAGE 3A CHRONIC KIDNEY DISEASE, WITHOUT LONG-TERM CURRENT USE OF INSULIN (CMS/HCC): Status: ACTIVE | Noted: 2023-07-01

## 2023-07-01 PROBLEM — R07.89 ATYPICAL CHEST PAIN: Status: ACTIVE | Noted: 2023-07-01

## 2023-07-01 PROBLEM — G47.33 OSA (OBSTRUCTIVE SLEEP APNEA): Status: ACTIVE | Noted: 2023-07-01

## 2023-07-01 PROBLEM — N18.31 TYPE 2 DIABETES MELLITUS WITH STAGE 3A CHRONIC KIDNEY DISEASE, WITHOUT LONG-TERM CURRENT USE OF INSULIN (CMS/HCC): Status: ACTIVE | Noted: 2023-07-01

## 2023-07-01 LAB
ALBUMIN SERPL-MCNC: 3.9 G/DL (ref 3.5–5.3)
ALP SERPL-CCNC: 54 U/L (ref 45–115)
ALT SERPL-CCNC: 46 U/L (ref 7–52)
ANION GAP SERPL CALC-SCNC: 16 MMOL/L (ref 3–11)
AST SERPL-CCNC: 108 U/L
BACTERIA #/AREA URNS HPF: NORMAL /HPF
BASOPHILS # BLD AUTO: 0.1 10*3/UL
BASOPHILS NFR BLD AUTO: 1.1 % (ref 0–2)
BILIRUB SERPL-MCNC: 0.51 MG/DL (ref 0.2–1.4)
BILIRUB UR QL: NEGATIVE
BUN SERPL-MCNC: 75 MG/DL (ref 7–25)
CALCIUM ALBUM COR SERPL-MCNC: 9.6 MG/DL (ref 8.6–10.3)
CALCIUM SERPL-MCNC: 9.5 MG/DL (ref 8.6–10.3)
CHLORIDE SERPL-SCNC: 98 MMOL/L (ref 98–107)
CLARITY UR: CLEAR
CO2 SERPL-SCNC: 14 MMOL/L (ref 21–32)
COLOR UR: ABNORMAL
CREAT SERPL-MCNC: 4.77 MG/DL (ref 0.7–1.3)
CREAT UR-MCNC: 122.9 MG/DL
DELTA HIGH SENSITIVITY TROPONIN I, 1 HOUR: -1.1
DELTA HIGH SENSITIVITY TROPONIN I, 2 HOUR: -0.7
EOSINOPHIL # BLD AUTO: 0.1 10*3/UL
EOSINOPHIL NFR BLD AUTO: 0.8 % (ref 0–3)
ERYTHROCYTE [DISTWIDTH] IN BLOOD BY AUTOMATED COUNT: 13.9 % (ref 11.5–15)
EST. AVERAGE GLUCOSE BLD GHB EST-MCNC: 128.4 MG/DL
GFR SERPL CREATININE-BSD FRML MDRD: 12 ML/MIN/1.73M*2
GLUCOSE SERPL-MCNC: 84 MG/DL (ref 70–105)
GLUCOSE UR QL: NEGATIVE MG/DL
HBA1C MFR BLD: 6.1 % (ref 4–6)
HCT VFR BLD AUTO: 37.2 % (ref 38–50)
HGB BLD-MCNC: 12.8 G/DL (ref 13.2–17.2)
HGB UR QL: ABNORMAL
KETONES UR-MCNC: NEGATIVE MG/DL
LEUKOCYTE ESTERASE UR QL STRIP: NEGATIVE
LYMPHOCYTES # BLD AUTO: 1.8 10*3/UL
LYMPHOCYTES NFR BLD AUTO: 17 % (ref 15–47)
MAGNESIUM SERPL-MCNC: 1.9 MG/DL (ref 1.8–2.4)
MCH RBC QN AUTO: 31.9 PG (ref 29–34)
MCHC RBC AUTO-ENTMCNC: 34.3 G/DL (ref 32–36)
MCV RBC AUTO: 93.1 FL (ref 82–97)
MONOCYTES # BLD AUTO: 0.9 10*3/UL
MONOCYTES NFR BLD AUTO: 8.4 % (ref 5–13)
NEUTROPHILS # BLD AUTO: 7.9 10*3/UL
NEUTROPHILS NFR BLD AUTO: 72.7 % (ref 46–70)
NITRITE UR QL: NEGATIVE
OSMOLALITY UR: 271 MOSM/KG (ref 250–900)
PH UR: 5 PH
PLATELET # BLD AUTO: 239 10*3/UL (ref 130–350)
PMV BLD AUTO: 8 FL (ref 6.9–10.8)
POTASSIUM SERPL-SCNC: 4.2 MMOL/L (ref 3.5–5.1)
PROT SERPL-MCNC: 7.3 G/DL (ref 6–8.3)
PROT UR STRIP-MCNC: 10 MG/DL
RBC # BLD AUTO: 4 10*6/ΜL (ref 4.1–5.8)
RBC #/AREA URNS HPF: NORMAL /HPF
SODIUM SERPL-SCNC: 128 MMOL/L (ref 135–145)
SODIUM UR-SCNC: 51 MMOL/L
SP GR UR: 1.01 (ref 1–1.03)
SQUAMOUS #/AREA URNS HPF: NORMAL /HPF
TROPONIN I SERPL-MCNC: 28.6 PG/ML
TROPONIN I SERPL-MCNC: 29 PG/ML
TROPONIN I SERPL-MCNC: 29.7 PG/ML
UROBILINOGEN UR-MCNC: NORMAL MG/DL
WBC # BLD AUTO: 10.8 10*3/UL (ref 3.7–9.6)
WBC #/AREA URNS HPF: NORMAL /HPF
WBC CLUMPS #/AREA URNS HPF: NORMAL /HPF

## 2023-07-01 PROCEDURE — 84484 ASSAY OF TROPONIN QUANT: CPT | Performed by: EMERGENCY MEDICINE

## 2023-07-01 PROCEDURE — 99285 EMERGENCY DEPT VISIT HI MDM: CPT | Performed by: EMERGENCY MEDICINE

## 2023-07-01 PROCEDURE — 2590000100 HC RX 259: Performed by: EMERGENCY MEDICINE

## 2023-07-01 PROCEDURE — 99223 1ST HOSP IP/OBS HIGH 75: CPT | Mod: AI | Performed by: INTERNAL MEDICINE

## 2023-07-01 PROCEDURE — 85025 COMPLETE CBC W/AUTO DIFF WBC: CPT | Performed by: EMERGENCY MEDICINE

## 2023-07-01 PROCEDURE — 83935 ASSAY OF URINE OSMOLALITY: CPT | Performed by: INTERNAL MEDICINE

## 2023-07-01 PROCEDURE — 6370000100 HC RX 637 (ALT 250 FOR IP): Performed by: EMERGENCY MEDICINE

## 2023-07-01 PROCEDURE — 93005 ELECTROCARDIOGRAM TRACING: CPT

## 2023-07-01 PROCEDURE — 81001 URINALYSIS AUTO W/SCOPE: CPT | Performed by: INTERNAL MEDICINE

## 2023-07-01 PROCEDURE — 80053 COMPREHEN METABOLIC PANEL: CPT | Performed by: EMERGENCY MEDICINE

## 2023-07-01 PROCEDURE — 84300 ASSAY OF URINE SODIUM: CPT | Performed by: INTERNAL MEDICINE

## 2023-07-01 PROCEDURE — 83735 ASSAY OF MAGNESIUM: CPT | Performed by: EMERGENCY MEDICINE

## 2023-07-01 PROCEDURE — 82570 ASSAY OF URINE CREATININE: CPT | Performed by: INTERNAL MEDICINE

## 2023-07-01 PROCEDURE — 2580000300 HC RX 258: Performed by: EMERGENCY MEDICINE

## 2023-07-01 PROCEDURE — 73660 X-RAY EXAM OF TOE(S): CPT | Mod: LT

## 2023-07-01 PROCEDURE — 2500000200 HC RX 250 WO HCPCS: Performed by: EMERGENCY MEDICINE

## 2023-07-01 PROCEDURE — 83036 HEMOGLOBIN GLYCOSYLATED A1C: CPT | Performed by: INTERNAL MEDICINE

## 2023-07-01 PROCEDURE — 71045 X-RAY EXAM CHEST 1 VIEW: CPT

## 2023-07-01 PROCEDURE — 36415 COLL VENOUS BLD VENIPUNCTURE: CPT | Performed by: EMERGENCY MEDICINE

## 2023-07-01 RX ORDER — METFORMIN HYDROCHLORIDE 500 MG/1
500 TABLET ORAL 2 TIMES DAILY WITH MEALS
Status: ON HOLD | COMMUNITY
End: 2023-07-02

## 2023-07-01 RX ORDER — LEVOCETIRIZINE DIHYDROCHLORIDE 5 MG/1
5 TABLET, FILM COATED ORAL EVERY EVENING
COMMUNITY

## 2023-07-01 RX ORDER — INDOMETHACIN 50 MG/1
50 CAPSULE ORAL 3 TIMES DAILY PRN
COMMUNITY

## 2023-07-01 RX ORDER — MONTELUKAST SODIUM 10 MG/1
10 TABLET ORAL NIGHTLY
COMMUNITY

## 2023-07-01 RX ORDER — LEVOTHYROXINE SODIUM 125 UG/1
125 TABLET ORAL DAILY
Status: ON HOLD | COMMUNITY
End: 2023-07-02 | Stop reason: DRUGHIGH

## 2023-07-01 RX ORDER — SODIUM CHLORIDE 9 MG/ML
1000 INJECTION, SOLUTION INTRAVENOUS ONCE
Status: COMPLETED | OUTPATIENT
Start: 2023-07-01 | End: 2023-07-01

## 2023-07-01 RX ORDER — OXYCODONE HYDROCHLORIDE 5 MG/1
5 TABLET ORAL EVERY 4 HOURS PRN
Status: DISCONTINUED | OUTPATIENT
Start: 2023-07-01 | End: 2023-07-04 | Stop reason: HOSPADM

## 2023-07-01 RX ORDER — NAPROXEN SODIUM 220 MG/1
162 TABLET, FILM COATED ORAL ONCE
Status: COMPLETED | OUTPATIENT
Start: 2023-07-01 | End: 2023-07-01

## 2023-07-01 RX ORDER — ROSUVASTATIN CALCIUM 20 MG/1
20 TABLET, COATED ORAL DAILY
Status: ON HOLD | COMMUNITY
End: 2023-07-02 | Stop reason: DRUGHIGH

## 2023-07-01 RX ORDER — SODIUM CHLORIDE, SODIUM LACTATE, POTASSIUM CHLORIDE, AND CALCIUM CHLORIDE .6; .31; .03; .02 G/100ML; G/100ML; G/100ML; G/100ML
1000 INJECTION, SOLUTION INTRAVENOUS ONCE
Status: DISCONTINUED | OUTPATIENT
Start: 2023-07-01 | End: 2023-07-01

## 2023-07-01 RX ORDER — NAPROXEN 250 MG/1
500 TABLET ORAL ONCE
Status: COMPLETED | OUTPATIENT
Start: 2023-07-01 | End: 2023-07-02

## 2023-07-01 RX ORDER — ALLOPURINOL 300 MG/1
300 TABLET ORAL DAILY
COMMUNITY

## 2023-07-01 RX ORDER — SUCRALFATE 1 G/1
1 TABLET ORAL ONCE
Status: COMPLETED | OUTPATIENT
Start: 2023-07-01 | End: 2023-07-01

## 2023-07-01 RX ORDER — LISINOPRIL 20 MG/1
20 TABLET ORAL DAILY
Status: ON HOLD | COMMUNITY
End: 2023-07-02 | Stop reason: ALTCHOICE

## 2023-07-01 RX ORDER — ASPIRIN 81 MG/1
81 TABLET ORAL DAILY
COMMUNITY

## 2023-07-01 RX ADMIN — SODIUM CHLORIDE 1000 ML: 9 INJECTION, SOLUTION INTRAVENOUS at 19:31

## 2023-07-01 RX ADMIN — ALUMINUM HYDROXIDE, MAGNESIUM HYDROXIDE, AND SIMETHICONE 45 ML: 200; 200; 20 SUSPENSION ORAL at 19:45

## 2023-07-01 RX ADMIN — NAPROXEN SODIUM 162 MG: 220 TABLET, FILM COATED ORAL at 20:38

## 2023-07-01 RX ADMIN — SUCRALFATE 1 G: 1 TABLET ORAL at 21:07

## 2023-07-01 RX ADMIN — SODIUM CHLORIDE 1000 ML: 9 INJECTION, SOLUTION INTRAVENOUS at 20:38

## 2023-07-01 ASSESSMENT — PAIN DESCRIPTION - DESCRIPTORS: DESCRIPTORS: ACHING

## 2023-07-02 ENCOUNTER — APPOINTMENT (OUTPATIENT)
Dept: CARDIOLOGY | Facility: HOSPITAL | Age: 68
DRG: 372 | End: 2023-07-02
Payer: MEDICARE

## 2023-07-02 ENCOUNTER — APPOINTMENT (OUTPATIENT)
Dept: ULTRASOUND IMAGING | Facility: HOSPITAL | Age: 68
DRG: 372 | End: 2023-07-02
Payer: MEDICARE

## 2023-07-02 PROBLEM — N17.9 AKI (ACUTE KIDNEY INJURY) (CMS/HCC): Status: ACTIVE | Noted: 2023-07-02

## 2023-07-02 LAB
ANION GAP SERPL CALC-SCNC: 13 MMOL/L (ref 3–11)
ASCENDING AORTA: 3.4 CM
AV LVOT PEAK GRADIENT: 5.1 MMHG
AV MEAN GRADIENT: 9.78 MMHG
AV PEAK GRADIENT: 17.81 MMHG
B PARAP IS1001 DNA NPH QL NAA+NON-PROBE: NEGATIVE
B PERT.PT PRMT NPH QL NAA+NON-PROBE: NEGATIVE
BSA FOR ECHO PROCEDURE: 2.47 M2
BUN SERPL-MCNC: 68 MG/DL (ref 7–25)
C DIFF TOX A+B STL QL IA: POSITIVE
C DIFF TOX GENS STL QL NAA+PROBE: POSITIVE
C PNEUM DNA NPH QL NAA+NON-PROBE: NEGATIVE
CALCIUM SERPL-MCNC: 8.8 MG/DL (ref 8.6–10.3)
CHLORIDE SERPL-SCNC: 105 MMOL/L (ref 98–107)
CO2 SERPL-SCNC: 14 MMOL/L (ref 21–32)
CREAT SERPL-MCNC: 2.98 MG/DL (ref 0.7–1.3)
DOP CALC AO PEAK VEL: 2.11 M/S
DOP CALC AO VTI: 35.9 CM
DOP CALC LVOT DIAMETER: 2.39 CM
DOP CALC LVOT STROKE VOLUME: 88 CM3
DOP CALC MV VTI: 34.25 CM
DOP CALC RVOT PEAK VEL: 1 M/S
DOP CALCLVOT PEAK VEL VTI: 19.5 CM
E/A RATIO: 0.8
E/E' RATIO (AVERAGE): 13.1
E/E' RATIO: 17.8
ECHO EF ESTIMATED: 75 %
EJECTION FRACTION: 77 %
FIBRIN D-DIMER (NG/ML) IN PLATELET POOR PLASMA: 2025 NG/ML FEU
FLUAV RNA NPH QL NAA+NON-PROBE: NEGATIVE
FLUBV RNA NPH QL NAA+NON-PROBE: NEGATIVE
GFR SERPL CREATININE-BSD FRML MDRD: 22 ML/MIN/1.73M*2
GLUCOSE BLDC GLUCOMTR-SCNC: 71 MG/DL (ref 70–105)
GLUCOSE BLDC GLUCOMTR-SCNC: 76 MG/DL (ref 70–105)
GLUCOSE BLDC GLUCOMTR-SCNC: 76 MG/DL (ref 70–105)
GLUCOSE BLDC GLUCOMTR-SCNC: 78 MG/DL (ref 70–105)
GLUCOSE BLDC GLUCOMTR-SCNC: 82 MG/DL (ref 70–105)
GLUCOSE SERPL-MCNC: 85 MG/DL (ref 70–105)
HADV DNA NPH QL NAA+NON-PROBE: NEGATIVE
HCOV 229E RNA NPH QL NAA+NON-PROBE: NEGATIVE
HCOV HKU1 RNA NPH QL NAA+NON-PROBE: NEGATIVE
HCOV NL63 RNA NPH QL NAA+NON-PROBE: NEGATIVE
HCOV OC43 RNA NPH QL NAA+NON-PROBE: NEGATIVE
HMPV RNA NPH QL NAA+NON-PROBE: NEGATIVE
HPIV1 RNA NPH QL NAA+NON-PROBE: NEGATIVE
HPIV2 RNA NPH QL NAA+NON-PROBE: NEGATIVE
HPIV3 RNA NPH QL NAA+NON-PROBE: NEGATIVE
HPIV4 RNA NPH QL NAA+NON-PROBE: NEGATIVE
INTERVENTRICULAR SEPTUM: 1.1 CM (ref 0.6–1.1)
LA AREA A4C SYSTOLE: 36.3 CM3
LEFT ATRIUM SIZE: 3.35 CM
LEFT ATRIUM VOLUME INDEX: 21 ML/M2
LEFT ATRIUM VOLUME: 50.7 CM3
LEFT INTERNAL DIMENSION IN SYSTOLE: 3.2 CM (ref 2.1–4)
LEFT VENTRICLE DIASTOLIC VOLUME: 118 CM3
LEFT VENTRICLE SYSTOLIC VOLUME: 28 CM3
LEFT VENTRICULAR INTERNAL DIMENSION IN DIASTOLE: 4.8 CM (ref 3.5–6)
LVAD-AP2: 31.7 CM2
M PNEUMO DNA NPH QL NAA+NON-PROBE: NEGATIVE
ML OF DILUTED DEFINITY INJECTED: 2 ML
MV DEC SLOPE: 6610 MM/S2
MV DT: 153 MS
MV MEAN GRADIENT: 2.7 MMHG
MV PEAK A VEL: 134 CM/S
MV PEAK E VEL: 113 CM/S
MV PEAK GRADIENT: 8 MMHG
MV STENOSIS PRESSURE HALF TIME: 43 MS
MV VALVE AREA P 1/2 METHOD: 5.12 CM2
MV VMAX: 143 CM/S
MVA (VTI): 2.56 CM2
POSTERIOR WALL: 1.1 CM (ref 0.6–1.1)
POTASSIUM SERPL-SCNC: 4.4 MMOL/L (ref 3.5–5.1)
PULM VEIN S/D RATIO: 1.3
PV PEAK D VEL: 55 CM/S
PV PEAK GRADIENT: 7.84 MMHG
PV PEAK S VEL: 70 CM/S
PV VMAX: 1.4 M/S
RA AREA: 11 CM2
RH CV ECHO AV VALVE AREA VEL: 2.4 CM2
RH CV ECHO AV VALVE AREA VTI: 2.4 CM2
RH LVOT PEAK VELOCITY REST: 1.1 M/S
RIGHT VENTRICULAR INTERNAL DIMENSION IN DIASTOLE: 3.9 CM
RSV RNA NPH QL NAA+NON-PROBE: NEGATIVE
RV AP4 BASE: 3.7 CM
RV+EV RNA NPH QL NAA+NON-PROBE: NEGATIVE
S': 22 CM/S
SARS-COV-2 RNA NPH QL NAA+NON-PROBE: NEGATIVE
SODIUM SERPL-SCNC: 132 MMOL/L (ref 135–145)
TDI: 6.3 CM/S
TDILATERAL: 13.4 CM/S
TRICUSPID ANNULAR PLANE SYSTOLIC EXCURSION: 3.8 CM
Z-SCORE OF LEFT VENTRICULAR DIMENSION IN END DIASTOLE: -8.01
Z-SCORE OF LEFT VENTRICULAR DIMENSION IN END SYSTOLE: -5.49

## 2023-07-02 PROCEDURE — 6360000200 HC RX 636 W HCPCS (ALT 250 FOR IP): Performed by: INTERNAL MEDICINE

## 2023-07-02 PROCEDURE — 82947 ASSAY GLUCOSE BLOOD QUANT: CPT | Mod: QW

## 2023-07-02 PROCEDURE — 85379 FIBRIN DEGRADATION QUANT: CPT | Performed by: INTERNAL MEDICINE

## 2023-07-02 PROCEDURE — 36415 COLL VENOUS BLD VENIPUNCTURE: CPT | Performed by: INTERNAL MEDICINE

## 2023-07-02 PROCEDURE — 80048 BASIC METABOLIC PNL TOTAL CA: CPT | Performed by: INTERNAL MEDICINE

## 2023-07-02 PROCEDURE — 2550000100 HC RX 255: Performed by: INTERNAL MEDICINE

## 2023-07-02 PROCEDURE — 96361 HYDRATE IV INFUSION ADD-ON: CPT

## 2023-07-02 PROCEDURE — 99232 SBSQ HOSP IP/OBS MODERATE 35: CPT | Performed by: INTERNAL MEDICINE

## 2023-07-02 PROCEDURE — 99285 EMERGENCY DEPT VISIT HI MDM: CPT

## 2023-07-02 PROCEDURE — 2580000300 HC RX 258: Performed by: INTERNAL MEDICINE

## 2023-07-02 PROCEDURE — 1110000100 HC ROOM PRIVATE W TELE

## 2023-07-02 PROCEDURE — 87493 C DIFF AMPLIFIED PROBE: CPT | Performed by: INTERNAL MEDICINE

## 2023-07-02 PROCEDURE — 96360 HYDRATION IV INFUSION INIT: CPT

## 2023-07-02 PROCEDURE — 6370000100 HC RX 637 (ALT 250 FOR IP): Performed by: INTERNAL MEDICINE

## 2023-07-02 PROCEDURE — 93970 EXTREMITY STUDY: CPT

## 2023-07-02 PROCEDURE — 93306 TTE W/DOPPLER COMPLETE: CPT

## 2023-07-02 PROCEDURE — 87635 SARS-COV-2 COVID-19 AMP PRB: CPT | Performed by: INTERNAL MEDICINE

## 2023-07-02 PROCEDURE — 6370000100 HC RX 637 (ALT 250 FOR IP): Performed by: EMERGENCY MEDICINE

## 2023-07-02 PROCEDURE — C8929 TTE W OR WO FOL WCON,DOPPLER: HCPCS

## 2023-07-02 PROCEDURE — 93306 TTE W/DOPPLER COMPLETE: CPT | Mod: 26 | Performed by: INTERNAL MEDICINE

## 2023-07-02 RX ORDER — LISINOPRIL AND HYDROCHLOROTHIAZIDE 20; 25 MG/1; MG/1
1 TABLET ORAL DAILY
COMMUNITY
Start: 2023-05-08

## 2023-07-02 RX ORDER — ONDANSETRON HYDROCHLORIDE 2 MG/ML
4 INJECTION, SOLUTION INTRAVENOUS EVERY 6 HOURS PRN
Status: DISCONTINUED | OUTPATIENT
Start: 2023-07-02 | End: 2023-07-04 | Stop reason: HOSPADM

## 2023-07-02 RX ORDER — MONTELUKAST SODIUM 10 MG/1
10 TABLET ORAL NIGHTLY
Status: DISCONTINUED | OUTPATIENT
Start: 2023-07-02 | End: 2023-07-04 | Stop reason: HOSPADM

## 2023-07-02 RX ORDER — SODIUM CHLORIDE 0.9 % (FLUSH) 0.9 %
3 SYRINGE (ML) INJECTION AS NEEDED
Status: DISCONTINUED | OUTPATIENT
Start: 2023-07-02 | End: 2023-07-04 | Stop reason: HOSPADM

## 2023-07-02 RX ORDER — SODIUM CHLORIDE 9 MG/ML
125 INJECTION, SOLUTION INTRAVENOUS CONTINUOUS
Status: DISCONTINUED | OUTPATIENT
Start: 2023-07-02 | End: 2023-07-03

## 2023-07-02 RX ORDER — HYDROCODONE BITARTRATE AND ACETAMINOPHEN 7.5; 325 MG/1; MG/1
1 TABLET ORAL EVERY 6 HOURS PRN
Status: ON HOLD | COMMUNITY
Start: 2023-05-16 | End: 2023-07-02 | Stop reason: ALTCHOICE

## 2023-07-02 RX ORDER — NITROGLYCERIN 0.4 MG/1
0.4 TABLET SUBLINGUAL EVERY 5 MIN PRN
Status: DISCONTINUED | OUTPATIENT
Start: 2023-07-02 | End: 2023-07-04 | Stop reason: HOSPADM

## 2023-07-02 RX ORDER — ACETAMINOPHEN 325 MG/1
650 TABLET ORAL EVERY 4 HOURS PRN
Status: DISCONTINUED | OUTPATIENT
Start: 2023-07-02 | End: 2023-07-04 | Stop reason: HOSPADM

## 2023-07-02 RX ORDER — SODIUM CHLORIDE 9 MG/ML
125 INJECTION, SOLUTION INTRAVENOUS CONTINUOUS
Status: DISCONTINUED | OUTPATIENT
Start: 2023-07-02 | End: 2023-07-02

## 2023-07-02 RX ORDER — ALLOPURINOL 100 MG/1
100 TABLET ORAL DAILY
Status: DISCONTINUED | OUTPATIENT
Start: 2023-07-02 | End: 2023-07-04 | Stop reason: HOSPADM

## 2023-07-02 RX ORDER — MORPHINE SULFATE 2 MG/ML
2 INJECTION, SOLUTION INTRAMUSCULAR; INTRAVENOUS
Status: DISCONTINUED | OUTPATIENT
Start: 2023-07-02 | End: 2023-07-03

## 2023-07-02 RX ORDER — TALC
3 POWDER (GRAM) TOPICAL NIGHTLY PRN
Status: DISCONTINUED | OUTPATIENT
Start: 2023-07-02 | End: 2023-07-04 | Stop reason: HOSPADM

## 2023-07-02 RX ORDER — SODIUM CHLORIDE 0.9 % (FLUSH) 0.9 %
3 SYRINGE (ML) INJECTION ONCE
Status: COMPLETED | OUTPATIENT
Start: 2023-07-02 | End: 2023-07-02

## 2023-07-02 RX ORDER — VANCOMYCIN HYDROCHLORIDE 125 MG/1
125 CAPSULE ORAL 4 TIMES DAILY
Status: DISCONTINUED | OUTPATIENT
Start: 2023-07-02 | End: 2023-07-04 | Stop reason: HOSPADM

## 2023-07-02 RX ORDER — DEXTROSE 40 %
15 GEL (GRAM) ORAL
Status: DISCONTINUED | OUTPATIENT
Start: 2023-07-02 | End: 2023-07-04 | Stop reason: HOSPADM

## 2023-07-02 RX ORDER — DEXTROSE 50 % IN WATER (D50W) INTRAVENOUS SYRINGE
15-30
Status: DISCONTINUED | OUTPATIENT
Start: 2023-07-02 | End: 2023-07-04 | Stop reason: HOSPADM

## 2023-07-02 RX ORDER — LEVOTHYROXINE SODIUM 137 UG/1
137 TABLET ORAL EVERY MORNING
COMMUNITY
Start: 2023-04-09

## 2023-07-02 RX ORDER — ROSUVASTATIN CALCIUM 10 MG/1
10 TABLET, COATED ORAL DAILY
Status: DISCONTINUED | OUTPATIENT
Start: 2023-07-02 | End: 2023-07-04 | Stop reason: HOSPADM

## 2023-07-02 RX ORDER — METFORMIN HYDROCHLORIDE 500 MG/1
500 TABLET, EXTENDED RELEASE ORAL DAILY
COMMUNITY
Start: 2023-06-16

## 2023-07-02 RX ORDER — IBUPROFEN 800 MG/1
800 TABLET ORAL 3 TIMES DAILY
COMMUNITY
Start: 2023-05-31 | End: 2023-07-04 | Stop reason: HOSPADM

## 2023-07-02 RX ORDER — INSULIN ASPART 100 [IU]/ML
0-15 INJECTION, SOLUTION INTRAVENOUS; SUBCUTANEOUS
Status: DISCONTINUED | OUTPATIENT
Start: 2023-07-02 | End: 2023-07-04 | Stop reason: HOSPADM

## 2023-07-02 RX ORDER — ROSUVASTATIN CALCIUM 20 MG/1
20 TABLET, COATED ORAL DAILY
Status: DISCONTINUED | OUTPATIENT
Start: 2023-07-02 | End: 2023-07-02

## 2023-07-02 RX ORDER — FAMOTIDINE 20 MG/1
20 TABLET, FILM COATED ORAL DAILY
Status: DISCONTINUED | OUTPATIENT
Start: 2023-07-02 | End: 2023-07-04 | Stop reason: HOSPADM

## 2023-07-02 RX ORDER — BISACODYL 10 MG/1
10 SUPPOSITORY RECTAL DAILY PRN
Status: DISCONTINUED | OUTPATIENT
Start: 2023-07-02 | End: 2023-07-04 | Stop reason: HOSPADM

## 2023-07-02 RX ORDER — HEPARIN SODIUM 5000 [USP'U]/ML
5000 INJECTION, SOLUTION INTRAVENOUS; SUBCUTANEOUS EVERY 8 HOURS SCHEDULED
Status: DISCONTINUED | OUTPATIENT
Start: 2023-07-02 | End: 2023-07-04 | Stop reason: HOSPADM

## 2023-07-02 RX ORDER — ROSUVASTATIN CALCIUM 10 MG/1
10 TABLET, COATED ORAL DAILY
COMMUNITY
Start: 2023-06-26

## 2023-07-02 RX ADMIN — SODIUM CHLORIDE 125 ML/HR: 9 INJECTION, SOLUTION INTRAVENOUS at 01:21

## 2023-07-02 RX ADMIN — Medication 3 MG: at 02:08

## 2023-07-02 RX ADMIN — MONTELUKAST 10 MG: 10 TABLET, FILM COATED ORAL at 01:22

## 2023-07-02 RX ADMIN — NAPROXEN 500 MG: 250 TABLET ORAL at 00:28

## 2023-07-02 RX ADMIN — VANCOMYCIN HYDROCHLORIDE 125 MG: 125 CAPSULE ORAL at 17:01

## 2023-07-02 RX ADMIN — PERFLUTREN 2 ML: 6.52 INJECTION, SUSPENSION INTRAVENOUS at 11:30

## 2023-07-02 RX ADMIN — Medication 3 ML: at 11:35

## 2023-07-02 RX ADMIN — Medication 3 MG: at 23:24

## 2023-07-02 RX ADMIN — HEPARIN SODIUM 5000 UNITS: 5000 INJECTION, SOLUTION INTRAVENOUS; SUBCUTANEOUS at 06:07

## 2023-07-02 RX ADMIN — ALLOPURINOL 100 MG: 100 TABLET ORAL at 08:07

## 2023-07-02 RX ADMIN — FAMOTIDINE 20 MG: 20 TABLET ORAL at 14:51

## 2023-07-02 RX ADMIN — VANCOMYCIN HYDROCHLORIDE 125 MG: 125 CAPSULE ORAL at 21:53

## 2023-07-02 RX ADMIN — SODIUM CHLORIDE 125 ML/HR: 9 INJECTION, SOLUTION INTRAVENOUS at 17:03

## 2023-07-02 RX ADMIN — HEPARIN SODIUM 5000 UNITS: 5000 INJECTION, SOLUTION INTRAVENOUS; SUBCUTANEOUS at 21:53

## 2023-07-02 RX ADMIN — LEVOTHYROXINE SODIUM 125 MCG: 0.1 TABLET ORAL at 07:29

## 2023-07-02 RX ADMIN — MONTELUKAST 10 MG: 10 TABLET, FILM COATED ORAL at 21:53

## 2023-07-02 RX ADMIN — HEPARIN SODIUM 5000 UNITS: 5000 INJECTION, SOLUTION INTRAVENOUS; SUBCUTANEOUS at 14:51

## 2023-07-02 RX ADMIN — ROSUVASTATIN 10 MG: 10 TABLET, FILM COATED ORAL at 11:57

## 2023-07-02 RX ADMIN — HEPARIN SODIUM 5000 UNITS: 5000 INJECTION, SOLUTION INTRAVENOUS; SUBCUTANEOUS at 01:22

## 2023-07-02 ASSESSMENT — ENCOUNTER SYMPTOMS
DIARRHEA: 1
DIZZINESS: 1
SORE THROAT: 1

## 2023-07-02 ASSESSMENT — ACTIVITIES OF DAILY LIVING (ADL): ADEQUATE_TO_COMPLETE_ADL: YES
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
## 2023-07-02 NOTE — INTERDISCIPLINARY/THERAPY
Case Management Admission Note    Phone # [phone]    Living Situation: Spouse/significant other Private residence            ADLs: Independent  Stairs: No    HME/CPAP: None      Oxygen: No      Home Health:No     Current Resources: None      Diabetes/supplies: Do you have Diabetes?: Yes  Do you have diabetic supplies?: No (Comment) (Takes oral meds, no testing at home.)  PCP: Sarkis Nguyen  Funding: Medicare replacement plan.   Pharmacy:[facility] [id number] [address]    Source of Information: Patient Interview  Support Person: Primary Emergency Contact: FriendRobinson, Home Phone: [phone], Mobile Phone: [phone], Relation: Friend  Advance Directives (For Healthcare)  Advance Directive: Patient does not have advance directive  Needs transportation assistance at DC: No (Family/Friends to pick patient up and trailer bike home.)  Has discharge transport been arranged?: No  Discharge Needs/Barriers:   Limited support locally.   Narrative: Admitted for CP- Likely dehydrated, Acute on Chronic Kidney failure- Fluids/monitoring.  Ind and limited at baseline. Supportive friends and wife who is in poor health at home.  Plans to trailer bike and vehicle home. MEds to [facility] pharm.  From [place].  DSP: RAY  Dispo: RAY

## 2023-07-02 NOTE — PLAN OF CARE
Problem: Pain - Adult  Goal: Verbalizes/displays adequate comfort level or baseline comfort level  Description: INTERVENTIONS:  1. Encourage patient to monitor pain and request interventions  2. Assess pain using the appropriate pain scale  3. Administer analgesics based on type and severity of pain and evaluate response  4. Educate/Implement non-pharmacological measures as appropriate and evaluate response  5. Consider cultural, developmental and social influences on pain and pain management  6. Notify Provider if interventions unsuccessful or patient reports new pain  7/2/2023 0202 by Yovana Post RN  Outcome: Progressing  7/2/2023 0202 by Yovana Post RN  Outcome: Progressing     Problem: Infection - Adult  Goal: Absence of infection during hospitalization  Description: INTERVENTIONS:  1. Assess and monitor for signs and symptoms of infection  2. Monitor lab/diagnostic results  3. Monitor all insertion sites/wounds/incisions  4. Monitor secretions for changes in amount and color  5. Administer medications as ordered  6. Educate and encourage patient and family to use good hand hygiene technique  7. Identify and educate in appropriate isolation precautions for identified infection/condition  7/2/2023 0202 by Yovana Post RN  Outcome: Progressing  7/2/2023 0202 by Yovana Post RN  Outcome: Progressing     Problem: Safety Adult  Goal: Patient will remain safe during hospitalization  Description: INTERVENTIONS    1. Assess patient for fall risk and implement interventions if needed  2. Use safe transport techniques  3. Assess patient using the appropriate Charlie skin assessment scale  4. Assess patient for risk of aspiration  5. Assess patient for risk of elopement  6. Assess patient for risk of suicide  7/2/2023 0202 by Yovana Post RN  Outcome: Progressing  7/2/2023 0202 by Yovana Post RN  Outcome: Progressing     Problem: Daily Care  Goal: Daily care needs are met  Description: INTERVENTIONS:    1. Assess and monitor skin integrity  2. Identify patients at risk for skin breakdown on admission and per policy  3. Assess and monitor ability to perform self care and identify potential discharge needs  4. Assess skin integrity/risk for skin breakdown  5. Assist patient with activities of daily living as needed  6. Encourage independent activity per ability   7. Provide mouth care   8. Include patient/family/caregiver in decisions related to daily care   7/2/2023 0202 by Yovana Post RN  Outcome: Progressing  7/2/2023 0202 by Yovana Post RN  Outcome: Progressing     Problem: Knowledge Deficit  Goal: Patient/family/caregiver demonstrates understanding of disease process, treatment plan, medications, and discharge instructions  Description: INTERVENTIONS:   1. Complete learning assessment and assess knowledge base  2. Provide teaching at level of understanding   3. Provide teaching via preferred learning methods  7/2/2023 0202 by Yovana Post RN  Outcome: Progressing  7/2/2023 0202 by Yovana Post RN  Outcome: Progressing     Problem: Discharge Barriers  Goal: Patient's discharge needs are met  Description: INTERVENTIONS:  1. Assess patient for self-management skills  2. Encourage participation in management  3. Identify potential discharge barriers on admission and throughout hospital stay  4. Involve patient/family/caregiver in discharge planning process  5. Collaborate with case management/ for discharge needs  7/2/2023 0202 by Yovana Post RN  Outcome: Progressing  7/2/2023 0202 by Yovana Post RN  Outcome: Progressing     Problem: Cardiovascular - Adult  Goal: Maintains optimal cardiac output and hemodynamic stability  Description: INTERVENTIONS:  1. Monitor vital signs and rhythm  2. Monitor for hypotension and other signs of decreased cardiac output  3. Administer and titrate ordered vasoactive medications to optimize hemodynamic stability  4. Monitor for fluid  overload/dehydration, weight gain, shortness of breath and activity intolerance  5. Monitor arterial and/or venous puncture sites for bleeding and/or hematoma  6. Assess quality of pulses, capillary refill, edema, sensation, skin color and temperature  7. Assess for signs of decreased coronary artery perfusion - ex. angina  7/2/2023 0202 by Yovana Post, RN  Outcome: Progressing  7/2/2023 0202 by Yovana Post, RN  Outcome: Progressing     Problem: Metabolic/Fluid and Electrolytes - Adult  Goal: Electrolytes maintained within normal limits  Description: INTERVENTIONS:  1. Monitor labs and assess patient for signs and symptoms of electrolyte imbalances  2. Administer electrolyte replacement as ordered  3. Monitor response to electrolyte replacements, including repeat lab results as appropriate  4. Fluid restriction as ordered  5. Instruct patient on fluid and nutrition restrictions as appropriate  Outcome: Progressing  Goal: Maintain Optimal Renal Function and Hemodynamic Stability  Description: INTERVENTIONS:  1. Monitor labs and assess for signs and symptoms of volume excess or deficit  2. Monitor intake, output and patient weight  3. Monitor urine specific gravity, serum osmolarity and serum sodium as indicated or ordered  4. Monitor response to interventions for patient's volume status, including labs, urine output, blood pressure (other measures as available)  5. Encourage oral intake as appropriate  6. Instruct patient on fluid and nutrition restrictions as appropriate  Outcome: Progressing  Goal: Glucose maintained within prescribed range  Description: INTERVENTIONS:  1. Monitor blood glucose as ordered  2. Assess for signs and symptoms of hyperglycemia and hypoglycemia  3. Administer ordered medications to maintain glucose within target range  4. Assess barriers to adequate nutritional intake and initiate nutrition consult as needed  5. Assess baseline knowledge and provide education as indicated  6.  Monitor exercise as may reduce the requirements for insulin  Outcome: Progressing     Problem: Hematologic - Adult  Goal: Maintains hematologic stability  Description: INTERVENTIONS  1. Assess for signs and symptoms of bleeding or hemorrhage  2. Monitor labs  3. Administer supportive blood products/factors as ordered and appropriate  4. Administer medications as ordered  5. Initiate bleeding precautions as indicated  6. Educate patient/family to report signs/symptoms of bleeding  Outcome: Progressing

## 2023-07-02 NOTE — NURSING END OF SHIFT
Nursing End of Shift Summary:    Patient: Simone Banks Jr  MRN: [id number]  : 1955, Age: 67 y.o.    Location: [address] [place] [address]    Nursing Goals  Clinical Goals for the Shift: maintain comfort and safety, monitor VS and chest pain    Narrative Summary of Progress Toward Clinical Goals:  Pt alert and oriented x4 upon arrival from the ED. Complaints of pain had improved from admission to the ED. Pt attempted to eat dinner but the sandwich was too much for the pain in his throat. Comfort and safety maintained.    Barriers to Goals/Nursing Concerns:  No    New Patient or Family Concerns/Issues:  No    Shift Summary:   Significant Events & Communications to Providers (last 12 hours)       Last 5 Values    No documentation.                 Oxygen Usage (last 12 hours)       Last 5 Values       Row Name 23 0000                   Room Air or Baseline Oxygen Trial by Nursing    Is Patient on Room Air OR on the Same Amount of O2 as at Home? Yes                      Mobility (last 12 hours)       Last 5 Values       Row Name 23 0024 23 0300                Mobility    Patient Position Sitting Supine       Turning Turns self --                     Urethral Catheter       Active Urethral Catheter       None                  Active Lines       Active Central venous catheter / Peripherally inserted central catheter / Implantable Port / Hemodialysis catheter / Midline Catheter       None                  Infusing Medications   Medication Dose Last Rate    sodium chloride 0.9 %  125 mL/hr 125 mL/hr (23 0121)     PRN Medications   Medication Dose Last Admin    sodium chloride  3 mL      nitroglycerin  0.4 mg      acetaminophen  650 mg      morphine  2 mg      melatonin  3 mg 3 mg at 23 0208    bisacodyL  10 mg      ondansetron  4 mg      antacid/lidocaine 2% viscous (GI COCKTAIL KIT) 45 mL suspension  45 mL      dextrose 50 % in water (D50W)  15-30 mL      dextrose  15.2 g      glucagon  1 mg      Or     glucagon  1 mg      oxyCODONE  5 mg

## 2023-07-02 NOTE — MEDICATION HISTORY SPECIALIST NOTES
[facility] OU [id number]    CSN: [id number]  : [id number]    Information sources:  EPIC  Complete Dispense Report  Go Reconcile  Dr Thibodeaux med list    Allergies verified.    Patient interviewed who provided all history. Patient verified medications and provided last doses. Verified with Complete Dispense Report.    New medications added:  Ibuprofen  Lisinopril/Hydrochlorothiazide    Discontinued medications:  Noroc - therapy complete    Dose changes:  Levothyroxine changed from 125 mcg daily to 137 mcg daily.  Metformin changed from  mg twice daily to  mg daily.  Rosuvastatin changed from 20 mg daily to 10 mg daily.    See  for medication resources.     Pt and writer met in pt's room to complete safety plan.  Pt stated she was tired and asked that writer meet in her room so she could rest.    Pt stated it was an emotional day for her.  Pt became tearful again when owning that she \"came to terms that I really nee dto do this (AODA) treatment or I won't have a life\".  Pt stated it was also very difficult to hear the pain of a peer talking about the recent loss of her spouse.  Pt admits that she judges herself that she \"should be over it because it was 7 years ago\".  Writer and pt processed that grief is an ongoing evolution of pain and healing.    Pt stated she knows she needs more support because she \"does not feel that firm decision to stop drinking like I did when I decided to stop using drugs\".  Pt has intention to talk to her sons about her decision to pursue AODA residential tx.  Pt is aware that her attending physician has asked that an additional day of IP support be requested.      Pt able to identify that she \"hates dealing with my emotions\" and the fear she has about ability to cope with daily life if she is unable to use drinking \"to numb out\".  Pt praised for insight that she tends to turn to drinking when:  1. \"My anxiety gets to be that feeling in my chest where I feel \"crushed in my chest and can't breathe\"  2.  \"I get so depressed that alcohol gave me a sense of relief, maybe even a bit oh happiness\"    Pt also able to identify the anger and shame of her past bxs of drug use and drinking.  Writer praised pt for being able to start talking about these difficult issues and learning to live life differently.    PLAN:  1.  Pt informed that writer has initiated contact with her OP provider.  Writer will leave V/M of transition plan (intent to pursue AODA residential)  2.  Pt continues to attend groups  3.  Pt concern placed to nursing about ants in her restroom  4.  Unit 1 Nurse (Ladonna) placing repeat request for spiritual consult visit.
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
## 2023-07-02 NOTE — NURSING END OF SHIFT
Nursing End of Shift Summary:    Patient: Simone Banks Jr  MRN: [id number]  : 1955, Age: 67 y.o.    Location: [address] [place] [address]    Nursing Goals  Clinical Goals for the Shift: stool sample, s/sx of infection, VS and chest pain    Narrative Summary of Progress Toward Clinical Goals:  Comfort and safety maintained. Stool sample obtained this morning (See test results). VS stable. No complaints of chest pain during this shift.     Barriers to Goals/Nursing Concerns:  No    New Patient or Family Concerns/Issues:  No    Shift Summary:   Significant Events & Communications to Providers (last 12 hours)       Last 5 Values    No documentation.                 Oxygen Usage (last 12 hours)       Last 5 Values    No documentation.                 Mobility (last 12 hours)       Last 5 Values       Row Name 23 0800 23 0826 23 0944 23 1100 23 1200       Mobility    Activity Ambulate in room;Bathroom privileges -- Ambulate in room;Bathroom privileges -- Ambulate in room;Bathroom privileges    Level of Assistance -- -- Independent -- --    Length of Time in Chair (min) 0 -- -- -- 0    Distance Ambulated (feet) 20 Feet -- 20 Feet -- 10 Feet    Distance Ambulated (Meters Calculated) 6.1 Meters -- 6.1 Meters -- 3.05 Meters    Patient Position -- Supine Sitting Sitting --    Turning Turns self Turns self Turns self -- Turns self    Distance Ambulated (Meters Calculated)(Do Not Use) 6.1 Feet -- 6.1 Feet -- 3.05 Feet      Row Name 23 1551 23 1709                Mobility    Activity -- Ambulate in room;Bathroom privileges       Length of Time in Chair (min) -- 0       Distance Ambulated (feet) -- 15 Feet       Distance Ambulated (Meters Calculated) -- 4.57 Meters       Patient Position Supine --       Turning -- Turns self       Distance Ambulated (Meters Calculated)(Do Not Use) -- 4.57 Feet                     Urethral Catheter       Active Urethral Catheter       None                  Active  Lines       Active Central venous catheter / Peripherally inserted central catheter / Implantable Port / Hemodialysis catheter / Midline Catheter       None                  Infusing Medications   Medication Dose Last Rate    sodium chloride 0.9 %  125 mL/hr 125 mL/hr (07/02/23 1703)     PRN Medications   Medication Dose Last Admin    sodium chloride  3 mL      nitroglycerin  0.4 mg      acetaminophen  650 mg      morphine  2 mg      melatonin  3 mg 3 mg at 07/02/23 0208    bisacodyL  10 mg      ondansetron  4 mg      antacid/lidocaine 2% viscous (GI COCKTAIL KIT) 45 mL suspension  45 mL      dextrose 50 % in water (D50W)  15-30 mL      dextrose  15.2 g      glucagon  1 mg      Or    glucagon  1 mg      oxyCODONE  5 mg

## 2023-07-03 LAB
ALBUMIN SERPL-MCNC: 2.9 G/DL (ref 3.5–5.3)
ANION GAP SERPL CALC-SCNC: 11 MMOL/L (ref 3–11)
BUN SERPL-MCNC: 56 MG/DL (ref 7–25)
CALCIUM ALBUM COR SERPL-MCNC: 9.2 MG/DL (ref 8.6–10.3)
CALCIUM SERPL-MCNC: 8.3 MG/DL (ref 8.6–10.3)
CHLORIDE SERPL-SCNC: 111 MMOL/L (ref 98–107)
CHOLEST SERPL-MCNC: 64 MG/DL (ref 0–199)
CO2 SERPL-SCNC: 13 MMOL/L (ref 21–32)
CREAT SERPL-MCNC: 1.67 MG/DL (ref 0.7–1.3)
FASTING STATUS PATIENT QL REPORTED: YES
GFR SERPL CREATININE-BSD FRML MDRD: 44 ML/MIN/1.73M*2
GLUCOSE BLDC GLUCOMTR-SCNC: 101 MG/DL (ref 70–105)
GLUCOSE BLDC GLUCOMTR-SCNC: 68 MG/DL (ref 70–105)
GLUCOSE BLDC GLUCOMTR-SCNC: 73 MG/DL (ref 70–105)
GLUCOSE BLDC GLUCOMTR-SCNC: 98 MG/DL (ref 70–105)
GLUCOSE SERPL-MCNC: 83 MG/DL (ref 70–105)
HDLC SERPL-MCNC: 21 MG/DL
LDLC SERPL CALC-MCNC: 27 MG/DL (ref 20–99)
PHOSPHATE SERPL-MCNC: 3.2 MG/DL (ref 2.5–4.9)
POTASSIUM SERPL-SCNC: 4.2 MMOL/L (ref 3.5–5.1)
SODIUM SERPL-SCNC: 135 MMOL/L (ref 135–145)
TRIGL SERPL-MCNC: 82 MG/DL

## 2023-07-03 PROCEDURE — 80061 LIPID PANEL: CPT | Performed by: INTERNAL MEDICINE

## 2023-07-03 PROCEDURE — 36415 COLL VENOUS BLD VENIPUNCTURE: CPT | Performed by: INTERNAL MEDICINE

## 2023-07-03 PROCEDURE — 6370000100 HC RX 637 (ALT 250 FOR IP): Performed by: INTERNAL MEDICINE

## 2023-07-03 PROCEDURE — 80069 RENAL FUNCTION PANEL: CPT | Performed by: INTERNAL MEDICINE

## 2023-07-03 PROCEDURE — 99232 SBSQ HOSP IP/OBS MODERATE 35: CPT | Performed by: INTERNAL MEDICINE

## 2023-07-03 PROCEDURE — 6360000200 HC RX 636 W HCPCS (ALT 250 FOR IP): Performed by: INTERNAL MEDICINE

## 2023-07-03 PROCEDURE — 2580000300 HC RX 258: Performed by: INTERNAL MEDICINE

## 2023-07-03 PROCEDURE — 82947 ASSAY GLUCOSE BLOOD QUANT: CPT | Mod: QW

## 2023-07-03 PROCEDURE — (BLANK) HC ROOM PRIVATE

## 2023-07-03 RX ORDER — SODIUM CHLORIDE 9 MG/ML
75 INJECTION, SOLUTION INTRAVENOUS CONTINUOUS
Status: DISCONTINUED | OUTPATIENT
Start: 2023-07-03 | End: 2023-07-04 | Stop reason: HOSPADM

## 2023-07-03 RX ADMIN — ROSUVASTATIN 10 MG: 10 TABLET, FILM COATED ORAL at 09:08

## 2023-07-03 RX ADMIN — VANCOMYCIN HYDROCHLORIDE 125 MG: 125 CAPSULE ORAL at 17:16

## 2023-07-03 RX ADMIN — MONTELUKAST 10 MG: 10 TABLET, FILM COATED ORAL at 21:14

## 2023-07-03 RX ADMIN — ALLOPURINOL 100 MG: 100 TABLET ORAL at 09:09

## 2023-07-03 RX ADMIN — Medication 3 MG: at 21:13

## 2023-07-03 RX ADMIN — VANCOMYCIN HYDROCHLORIDE 125 MG: 125 CAPSULE ORAL at 21:14

## 2023-07-03 RX ADMIN — VANCOMYCIN HYDROCHLORIDE 125 MG: 125 CAPSULE ORAL at 12:20

## 2023-07-03 RX ADMIN — HEPARIN SODIUM 5000 UNITS: 5000 INJECTION, SOLUTION INTRAVENOUS; SUBCUTANEOUS at 21:13

## 2023-07-03 RX ADMIN — SODIUM CHLORIDE 75 ML/HR: 9 INJECTION, SOLUTION INTRAVENOUS at 16:36

## 2023-07-03 RX ADMIN — SODIUM CHLORIDE 75 ML/HR: 9 INJECTION, SOLUTION INTRAVENOUS at 21:14

## 2023-07-03 RX ADMIN — HEPARIN SODIUM 5000 UNITS: 5000 INJECTION, SOLUTION INTRAVENOUS; SUBCUTANEOUS at 12:18

## 2023-07-03 RX ADMIN — VANCOMYCIN HYDROCHLORIDE 125 MG: 125 CAPSULE ORAL at 09:08

## 2023-07-03 RX ADMIN — HEPARIN SODIUM 5000 UNITS: 5000 INJECTION, SOLUTION INTRAVENOUS; SUBCUTANEOUS at 06:07

## 2023-07-03 RX ADMIN — FAMOTIDINE 20 MG: 20 TABLET ORAL at 09:09

## 2023-07-03 RX ADMIN — SODIUM CHLORIDE 125 ML/HR: 9 INJECTION, SOLUTION INTRAVENOUS at 02:31

## 2023-07-03 ASSESSMENT — ENCOUNTER SYMPTOMS: DIARRHEA: 1

## 2023-07-03 NOTE — NURSING END OF SHIFT
Nursing End of Shift Summary:    Patient: Simone Banks Jr  MRN: [id number]  : 1955, Age: 67 y.o.    Location: [address] [place] [address]    Nursing Goals  Clinical Goals for the Shift: Monitor stooling/vital signs/labs    Narrative Summary of Progress Toward Clinical Goals:  Patient cdiff +  Active loose stools  FLOYD - IV fluids  Cardiac - telemetry - no cardiac events  Vital signs stable  Labs per MD orders    Barriers to Goals/Nursing Concerns:  Plan of care to be determined by patient response to current interventions  Positive cdiff  Discharge planner for home needs/[place] from out if state    New Patient or Family Concerns/Issues:      Shift Summary:   Significant Events & Communications to Providers (last 12 hours)       Last 5 Values       Row Name 23                   Provider Notification    Reason for Communication Vital signs out of parameters  low blood pressure/asymptomatic  -BS        Provider Name Franke CNP  -CYNTHIA                  User Key  (r) = Recorded By, (t) = Taken By, (c) = Cosigned By      Initials Name    Anni Early, RN                  Oxygen Usage (last 12 hours)       Last 5 Values       Row Name 23 0317             Room Air or Baseline Oxygen Trial by Nursing    Is Patient on Room Air OR on the Same Amount of O2 as at Home? Yes Yes Yes                    Mobility (last 12 hours)       Last 5 Values       Row Name 23 1551 23 1709 23 2016 23 0000       Mobility    Activity -- Ambulate in room;Bathroom privileges Bathroom privileges;Bedrest -- --    Level of Assistance -- -- Independent -- --    Length of Time in Chair (min) -- 0 -- -- --    Distance Ambulated (feet) -- 15 Feet -- -- --    Distance Ambulated (Meters Calculated) -- 4.57 Meters -- -- --    Patient Position Supine -- Supine Supine Supine    Turning -- Turns self Turns self -- --    Distance Ambulated (Meters Calculated)(Do Not Use) -- 4.57  Feet -- -- --      Row Name 07/03/23 0317                   Mobility    Patient Position Supine                      Urethral Catheter       Active Urethral Catheter       None                  Active Lines       Active Central venous catheter / Peripherally inserted central catheter / Implantable Port / Hemodialysis catheter / Midline Catheter       None                  Infusing Medications   Medication Dose Last Rate    sodium chloride 0.9 %  125 mL/hr 125 mL/hr (07/03/23 0231)     PRN Medications   Medication Dose Last Admin    sodium chloride  3 mL      nitroglycerin  0.4 mg      acetaminophen  650 mg      morphine  2 mg      melatonin  3 mg 3 mg at 07/02/23 2981    bisacodyL  10 mg      ondansetron  4 mg      antacid/lidocaine 2% viscous (GI COCKTAIL KIT) 45 mL suspension  45 mL      dextrose 50 % in water (D50W)  15-30 mL      dextrose  15.2 g      glucagon  1 mg      Or    glucagon  1 mg      oxyCODONE  5 mg
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
## 2023-07-04 VITALS
DIASTOLIC BLOOD PRESSURE: 91 MMHG | SYSTOLIC BLOOD PRESSURE: 139 MMHG | HEIGHT: 71 IN | WEIGHT: 274.47 LBS | TEMPERATURE: 97.9 F | BODY MASS INDEX: 38.43 KG/M2 | OXYGEN SATURATION: 97 % | RESPIRATION RATE: 19 BRPM | HEART RATE: 71 BPM

## 2023-07-04 PROBLEM — S90.112A CONTUSION OF LEFT GREAT TOE WITHOUT DAMAGE TO NAIL: Status: ACTIVE | Noted: 2023-07-04

## 2023-07-04 PROBLEM — A04.72 C. DIFFICILE COLITIS: Status: ACTIVE | Noted: 2023-07-04

## 2023-07-04 LAB
ALBUMIN SERPL-MCNC: 2.8 G/DL (ref 3.5–5.3)
ANION GAP SERPL CALC-SCNC: 8 MMOL/L (ref 3–11)
BUN SERPL-MCNC: 38 MG/DL (ref 7–25)
CALCIUM ALBUM COR SERPL-MCNC: 9.4 MG/DL (ref 8.6–10.3)
CALCIUM SERPL-MCNC: 8.4 MG/DL (ref 8.6–10.3)
CHLORIDE SERPL-SCNC: 111 MMOL/L (ref 98–107)
CO2 SERPL-SCNC: 17 MMOL/L (ref 21–32)
CREAT SERPL-MCNC: 1.39 MG/DL (ref 0.7–1.3)
GFR SERPL CREATININE-BSD FRML MDRD: 55 ML/MIN/1.73M*2
GLUCOSE BLDC GLUCOMTR-SCNC: 79 MG/DL (ref 70–105)
GLUCOSE BLDC GLUCOMTR-SCNC: 82 MG/DL (ref 70–105)
GLUCOSE SERPL-MCNC: 94 MG/DL (ref 70–105)
PHOSPHATE SERPL-MCNC: 2.9 MG/DL (ref 2.5–4.9)
POTASSIUM SERPL-SCNC: 4.1 MMOL/L (ref 3.5–5.1)
SODIUM SERPL-SCNC: 136 MMOL/L (ref 135–145)

## 2023-07-04 PROCEDURE — 80069 RENAL FUNCTION PANEL: CPT | Performed by: INTERNAL MEDICINE

## 2023-07-04 PROCEDURE — 99239 HOSP IP/OBS DSCHRG MGMT >30: CPT | Performed by: INTERNAL MEDICINE

## 2023-07-04 PROCEDURE — 6360000200 HC RX 636 W HCPCS (ALT 250 FOR IP): Performed by: INTERNAL MEDICINE

## 2023-07-04 PROCEDURE — 6370000100 HC RX 637 (ALT 250 FOR IP): Performed by: INTERNAL MEDICINE

## 2023-07-04 PROCEDURE — 2580000300 HC RX 258: Performed by: INTERNAL MEDICINE

## 2023-07-04 PROCEDURE — 36415 COLL VENOUS BLD VENIPUNCTURE: CPT | Performed by: INTERNAL MEDICINE

## 2023-07-04 PROCEDURE — 82947 ASSAY GLUCOSE BLOOD QUANT: CPT | Mod: QW

## 2023-07-04 RX ORDER — VANCOMYCIN HYDROCHLORIDE 125 MG/1
125 CAPSULE ORAL 4 TIMES DAILY
Qty: 33 CAPSULE | Refills: 0 | Status: SHIPPED | OUTPATIENT
Start: 2023-07-04 | End: 2023-07-13

## 2023-07-04 RX ORDER — ACETAMINOPHEN 500 MG
500 TABLET ORAL EVERY 6 HOURS PRN
Qty: 30 TABLET | Refills: 0
Start: 2023-07-04 | End: 2023-07-14

## 2023-07-04 RX ADMIN — ROSUVASTATIN 10 MG: 10 TABLET, FILM COATED ORAL at 07:45

## 2023-07-04 RX ADMIN — HEPARIN SODIUM 5000 UNITS: 5000 INJECTION, SOLUTION INTRAVENOUS; SUBCUTANEOUS at 05:51

## 2023-07-04 RX ADMIN — SODIUM CHLORIDE 75 ML/HR: 9 INJECTION, SOLUTION INTRAVENOUS at 10:07

## 2023-07-04 RX ADMIN — ALLOPURINOL 100 MG: 100 TABLET ORAL at 07:45

## 2023-07-04 RX ADMIN — LEVOTHYROXINE SODIUM 137 MCG: 0.11 TABLET ORAL at 07:45

## 2023-07-04 RX ADMIN — FAMOTIDINE 20 MG: 20 TABLET ORAL at 07:45

## 2023-07-04 RX ADMIN — VANCOMYCIN HYDROCHLORIDE 125 MG: 125 CAPSULE ORAL at 07:45

## 2023-07-04 RX ADMIN — VANCOMYCIN HYDROCHLORIDE 125 MG: 125 CAPSULE ORAL at 14:32

## 2023-07-04 NOTE — PLAN OF CARE
Problem: Pain - Adult  Goal: Verbalizes/displays adequate comfort level or baseline comfort level  Description: INTERVENTIONS:  1. Encourage patient to monitor pain and request interventions  2. Assess pain using the appropriate pain scale  3. Administer analgesics based on type and severity of pain and evaluate response  4. Educate/Implement non-pharmacological measures as appropriate and evaluate response  5. Consider cultural, developmental and social influences on pain and pain management  6. Notify Provider if interventions unsuccessful or patient reports new pain  Outcome: Progressing     Problem: Infection - Adult  Goal: Absence of infection during hospitalization  Description: INTERVENTIONS:  1. Assess and monitor for signs and symptoms of infection  2. Monitor lab/diagnostic results  3. Monitor all insertion sites/wounds/incisions  4. Monitor secretions for changes in amount and color  5. Administer medications as ordered  6. Educate and encourage patient and family to use good hand hygiene technique  7. Identify and educate in appropriate isolation precautions for identified infection/condition  Outcome: Progressing     Problem: Safety Adult  Goal: Patient will remain safe during hospitalization  Description: INTERVENTIONS    1. Assess patient for fall risk and implement interventions if needed  2. Use safe transport techniques  3. Assess patient using the appropriate Charlie skin assessment scale  4. Assess patient for risk of aspiration  5. Assess patient for risk of elopement  6. Assess patient for risk of suicide  Outcome: Progressing     Problem: Daily Care  Goal: Daily care needs are met  Description: INTERVENTIONS:   1. Assess and monitor skin integrity  2. Identify patients at risk for skin breakdown on admission and per policy  3. Assess and monitor ability to perform self care and identify potential discharge needs  4. Assess skin integrity/risk for skin breakdown  5. Assist patient with  activities of daily living as needed  6. Encourage independent activity per ability   7. Provide mouth care   8. Include patient/family/caregiver in decisions related to daily care   Outcome: Progressing     Problem: Knowledge Deficit  Goal: Patient/family/caregiver demonstrates understanding of disease process, treatment plan, medications, and discharge instructions  Description: INTERVENTIONS:   1. Complete learning assessment and assess knowledge base  2. Provide teaching at level of understanding   3. Provide teaching via preferred learning methods  Outcome: Progressing     Problem: Discharge Barriers  Goal: Patient's discharge needs are met  Description: INTERVENTIONS:  1. Assess patient for self-management skills  2. Encourage participation in management  3. Identify potential discharge barriers on admission and throughout hospital stay  4. Involve patient/family/caregiver in discharge planning process  5. Collaborate with case management/ for discharge needs  Outcome: Progressing     Problem: Cardiovascular - Adult  Goal: Maintains optimal cardiac output and hemodynamic stability  Description: INTERVENTIONS:  1. Monitor vital signs and rhythm  2. Monitor for hypotension and other signs of decreased cardiac output  3. Administer and titrate ordered vasoactive medications to optimize hemodynamic stability  4. Monitor for fluid overload/dehydration, weight gain, shortness of breath and activity intolerance  5. Monitor arterial and/or venous puncture sites for bleeding and/or hematoma  6. Assess quality of pulses, capillary refill, edema, sensation, skin color and temperature  7. Assess for signs of decreased coronary artery perfusion - ex. angina  Outcome: Progressing  Goal: Absence of cardiac dysrhythmias or at baseline  Description: INTERVENTIONS:  1. Continuous cardiac monitoring, monitor vital signs, obtain 12 lead EKG if indicated  2. Administer antiarrhythmic and heart rate control  medications as ordered  3. Initiate emergency measures for life threatening arrhythmias  4. Monitor electrolytes and administer replacement therapy as ordered  Outcome: Progressing     Problem: Metabolic/Fluid and Electrolytes - Adult  Goal: Electrolytes maintained within normal limits  Description: INTERVENTIONS:  1. Monitor labs and assess patient for signs and symptoms of electrolyte imbalances  2. Administer electrolyte replacement as ordered  3. Monitor response to electrolyte replacements, including repeat lab results as appropriate  4. Fluid restriction as ordered  5. Instruct patient on fluid and nutrition restrictions as appropriate  Outcome: Progressing  Goal: Maintain Optimal Renal Function and Hemodynamic Stability  Description: INTERVENTIONS:  1. Monitor labs and assess for signs and symptoms of volume excess or deficit  2. Monitor intake, output and patient weight  3. Monitor urine specific gravity, serum osmolarity and serum sodium as indicated or ordered  4. Monitor response to interventions for patient's volume status, including labs, urine output, blood pressure (other measures as available)  5. Encourage oral intake as appropriate  6. Instruct patient on fluid and nutrition restrictions as appropriate  Outcome: Progressing  Goal: Glucose maintained within prescribed range  Description: INTERVENTIONS:  1. Monitor blood glucose as ordered  2. Assess for signs and symptoms of hyperglycemia and hypoglycemia  3. Administer ordered medications to maintain glucose within target range  4. Assess barriers to adequate nutritional intake and initiate nutrition consult as needed  5. Assess baseline knowledge and provide education as indicated  6. Monitor exercise as may reduce the requirements for insulin  Outcome: Progressing     Problem: Hematologic - Adult  Goal: Maintains hematologic stability  Description: INTERVENTIONS  1. Assess for signs and symptoms of bleeding or hemorrhage  2. Monitor labs  3.  Administer supportive blood products/factors as ordered and appropriate  4. Administer medications as ordered  5. Initiate bleeding precautions as indicated  6. Educate patient/family to report signs/symptoms of bleeding  Outcome: Progressing     Problem: Infection Control  Goal: MINIMIZE THE ACQUISITION AND TRANSMISSION OF INFECTIOUS AGENTS  Description: INTERVENTIONS:  1. Isolate patient with suspected/diagnosed communicable disease  2. Place on designated isolation precautions  3. Maintain isolation techniques  4. Perform hand hygiene before and after each patient care activity  5. Dickinson universal precautions  6. Wear PPE as directed for type of isolation  7. Administer antibiotic therapy as ordered  8. Clean the environment appropriately after each patient use  9. Clean patient care equipment after each patient use as it leaves the room  10. Limit number of visitors, as appropriate  Outcome: Progressing     Problem: Safety Adult - Fall  Goal: Free from fall injury  Description: INTERVENTIONS:    Inpatient - Please reference Cares/Safety Flowsheet under Rankin Fall Risk for interventions.  Pediatrics - Please reference Peds Daily Cares/Safety Flowsheet under [facility] Pediatric Fall Assessment Fall Bundle for interventions  LD/OB - Please reference OB Shift Screening Flowsheet under OB Fall Risk for interventions.  Outcome: Progressing
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
## 2023-07-04 NOTE — NURSING END OF SHIFT
Nursing End of Shift Summary:    Patient: Simone Banks Jr  MRN: [id number]  : 1955, Age: 67 y.o.    Location: [address] [place] [address]    Nursing Goals  Clinical Goals for the Shift: monitor stools, VS, comfort&safety    Narrative Summary of Progress Toward Clinical Goals:  Stools remain watery and frequent throughout shift (see flowsheet). VS stable. Comfort and safety maintained.    Barriers to Goals/Nursing Concerns:  No    New Patient or Family Concerns/Issues:  No    Shift Summary:   Significant Events & Communications to Providers (last 12 hours)       Last 5 Values       Row Name 23 0917                   Shift Event    Shift Event Hypoglycemia  -KS        Hypoglycemia Symptoms None  -KS        Hypoglycemia Treatment Other (Comment)  pt is ordering breakfast now  -KS        Hypoglycemia Precipitating Factor Unknown  -KS        Hypoglycemia: Provider Contacted? Yes  -KS                  User Key  (r) = Recorded By, (t) = Taken By, (c) = Cosigned By      Initials Name    Shaila Garza, МАРИЯ                  Oxygen Usage (last 12 hours)       Last 5 Values    No documentation.                 Mobility (last 12 hours)       Last 5 Values       Row Name 23 0800 23 0808 23 1105 23 1128 23 1500       Mobility    Activity -- Bathroom privileges -- Ambulate in room;Bathroom privileges;Chair --    Level of Assistance -- Independent -- -- --    Length of Time in Chair (min) -- 0 -- 60 --    Distance Ambulated (feet) -- 15 Feet -- 20 Feet --    Distance Ambulated (Meters Calculated) -- 4.57 Meters -- 6.1 Meters --    Patient Position Supine -- Supine -- Standing    Turning -- Turns self -- Turns self --    Distance Ambulated (Meters Calculated)(Do Not Use) -- 4.57 Feet -- 6.1 Feet --      Row Name 23 1746                   Mobility    Activity Ambulate in room;Bathroom privileges        Length of Time in Chair (min) 0        Distance Ambulated (feet) 30 Feet        Distance Ambulated  (Meters Calculated) 9.14 Meters        Turning Turns self        Distance Ambulated (Meters Calculated)(Do Not Use) 9.14 Feet                      Urethral Catheter       Active Urethral Catheter       None                  Active Lines       Active Central venous catheter / Peripherally inserted central catheter / Implantable Port / Hemodialysis catheter / Midline Catheter       None                  Infusing Medications   Medication Dose Last Rate    sodium chloride 0.9 %  75 mL/hr 75 mL/hr (07/03/23 1636)     PRN Medications   Medication Dose Last Admin    sodium chloride  3 mL      nitroglycerin  0.4 mg      acetaminophen  650 mg      melatonin  3 mg 3 mg at 07/02/23 7574    bisacodyL  10 mg      ondansetron  4 mg      antacid/lidocaine 2% viscous (GI COCKTAIL KIT) 45 mL suspension  45 mL      dextrose 50 % in water (D50W)  15-30 mL      dextrose  15.2 g      glucagon  1 mg      Or    glucagon  1 mg      oxyCODONE  5 mg

## 2023-07-07 ENCOUNTER — TELEPHONE (OUTPATIENT)
Dept: PRIMARY CARE CLINIC | Facility: CLINIC | Age: 68
End: 2023-07-07
Payer: MEDICARE

## 2023-07-07 NOTE — TELEPHONE ENCOUNTER
----- Message from Gretchen Borrego sent at 7/7/2023  1:26 PM CDT -----  Regarding: advice  Contact: Patient  Type: Needs Medical Advice  Who Called:  Patient wife// Gabi   Symptoms (please be specific):    How long has patient had these symptoms:    Pharmacy name and phone #:    Best Call Back Number: [phone]  Additional Information: Patient wife stated that she would like to speak with the nurse. This is concerning patient having diarrhea. Wife would like to know what would be okay for her give to patient. Wife stated  that patient has been to restroom since he has been home at least 15 times. Please contact patient to advise. Thanks!          Your test results may take 1-3 days. You will get a text/email.  Please check the patient online portal (Ruth and website) for results. You can create a portal account at [email or web address] [facility]. If you have old records with [facility] or [facility]  or encounter any difficulties with us you will need to call the HELP line to merge results [id number] (Mon-Fri 8a-5p).    Please follow the instructions on provided coronavirus discharge educational forms and if needed self quarantine for 14 days.     If you test positive for COVID 19:    1. STAY HOME for 14 DAYS  2. Minimize human contact to ONLY ESSENTIAL  3. Every time you wash your hands, sing the HAPPY BIRTHDAY song so you know you're washing long enough.  Make sure to scrub the webspace between your fingers.  4. DRINK 1-3 Liters of fluids day x at least 5 days.  To remain hydrated. Your fatigue, lightheadedness, and body aches will decrease and your fever has a better chance of breaking if you are well hydrated.    5. For your Fever and Body aches takes Tylenol 650-100mg every 4-6h (max 4000mg/day). Try not to use ibuprofen, aspirin or naproxen (Advil, Motrin or Aleve) as these may worsen Coronavirus infection.  6. Use an inhaler for mild shortness of breath and cough  7. RETURN TO THE ER IMMEDIATELY IF YOU HAVE WORSENING SHORTNESS OF BREATH  8. TAKE THE FOLLOWING SUPPLEMENTS DAILY.        VITAMIN C 1000MG ONCE DAILY.        VITAMIN D 200IU ONCE DAILY.        ZINC 50MG ONCE DAILY.

## 2023-07-07 NOTE — TELEPHONE ENCOUNTER
Patient has kidney failure was admitted to the hospital, patient has cdip, and is having diarrhea. Has appointment with kidney specialist she just wanted to make you aware.

## 2023-07-10 ENCOUNTER — TELEPHONE (OUTPATIENT)
Dept: PRIMARY CARE CLINIC | Facility: CLINIC | Age: 68
End: 2023-07-10
Payer: MEDICARE

## 2023-07-10 ENCOUNTER — PATIENT MESSAGE (OUTPATIENT)
Dept: ADMINISTRATIVE | Facility: HOSPITAL | Age: 68
End: 2023-07-10
Payer: MEDICARE

## 2023-07-10 ENCOUNTER — PATIENT OUTREACH (OUTPATIENT)
Dept: ADMINISTRATIVE | Facility: HOSPITAL | Age: 68
End: 2023-07-10
Payer: MEDICARE

## 2023-07-10 ENCOUNTER — TELEPHONE (OUTPATIENT)
Dept: NEPHROLOGY | Facility: CLINIC | Age: 68
End: 2023-07-10
Payer: MEDICARE

## 2023-07-10 NOTE — PROGRESS NOTES
Routine Dilated Eye Exam  (Diabetic Retinopathy screening)     Non-compliant report chart audits for Eye Exam for Patients with Diabetes     Outreach to patient in reference to a routine Eye Exam    ACTIVE PORTAL MESSAGE SENT

## 2023-07-10 NOTE — TELEPHONE ENCOUNTER
----- Message from Paola Jama sent at 7/10/2023 11:42 AM CDT -----  Regarding: Giving update  Type: Needs Medical Advice  Wife  Best Call Back Number: [phone]    Additional Information: Wife just wanted to let the office know her hsuband got in to see Dr Rojo so they will cancel the one for the 14th, They will reach out if they needs anything, they appreicate the help

## 2023-07-10 NOTE — TELEPHONE ENCOUNTER
----- Message from Emilie Moses sent at 7/10/2023 10:44 AM CDT -----  Type:  Same Day Appointment Request    Caller is requesting a same day appointment.  Caller declined first available appointment listed below.      Name of Caller:  Gabi  When is the first available appointment?  7/14  Symptoms:  hospital f/u  Best Call Back Number:  [phone]  Additional Information:   Caller wants to know if the pt can get some lab work done as well, pl call bk to advise thanks

## 2023-07-10 NOTE — TELEPHONE ENCOUNTER
Spoke to patient wife. She states pat was put in the hospital for kidney failure from an antibiotic.   She states he is feeling better and set up to see Dr. Rojo at the end of the month. They are asking if they need to make an appt to see you or if seeing the nephrologist is fine at this point.

## 2023-07-11 ENCOUNTER — OFFICE VISIT (OUTPATIENT)
Dept: NEPHROLOGY | Facility: CLINIC | Age: 68
End: 2023-07-11
Payer: MEDICARE

## 2023-07-11 ENCOUNTER — HOSPITAL ENCOUNTER (OUTPATIENT)
Dept: RADIOLOGY | Facility: HOSPITAL | Age: 68
Discharge: HOME | End: 2023-07-11
Attending: INTERNAL MEDICINE
Payer: MEDICARE

## 2023-07-11 VITALS
BODY MASS INDEX: 38.05 KG/M2 | HEIGHT: 71 IN | DIASTOLIC BLOOD PRESSURE: 58 MMHG | HEART RATE: 61 BPM | SYSTOLIC BLOOD PRESSURE: 108 MMHG | WEIGHT: 271.81 LBS | OXYGEN SATURATION: 97 %

## 2023-07-11 DIAGNOSIS — I10 PRIMARY HYPERTENSION: ICD-10-CM

## 2023-07-11 DIAGNOSIS — N17.9 AKI (ACUTE KIDNEY INJURY): Primary | ICD-10-CM

## 2023-07-11 DIAGNOSIS — E66.01 SEVERE OBESITY: ICD-10-CM

## 2023-07-11 DIAGNOSIS — N17.9 AKI (ACUTE KIDNEY INJURY): ICD-10-CM

## 2023-07-11 PROCEDURE — 4010F PR ACE/ARB THEARPY RXD/TAKEN: ICD-10-PCS | Mod: CPTII,S$GLB,, | Performed by: INTERNAL MEDICINE

## 2023-07-11 PROCEDURE — 3288F PR FALLS RISK ASSESSMENT DOCUMENTED: ICD-10-PCS | Mod: CPTII,S$GLB,, | Performed by: INTERNAL MEDICINE

## 2023-07-11 PROCEDURE — 4010F ACE/ARB THERAPY RXD/TAKEN: CPT | Mod: CPTII,S$GLB,, | Performed by: INTERNAL MEDICINE

## 2023-07-11 PROCEDURE — 3008F BODY MASS INDEX DOCD: CPT | Mod: CPTII,S$GLB,, | Performed by: INTERNAL MEDICINE

## 2023-07-11 PROCEDURE — 99204 PR OFFICE/OUTPT VISIT, NEW, LEVL IV, 45-59 MIN: ICD-10-PCS | Mod: S$GLB,,, | Performed by: INTERNAL MEDICINE

## 2023-07-11 PROCEDURE — 76770 US EXAM ABDO BACK WALL COMP: CPT | Mod: TC,PO

## 2023-07-11 PROCEDURE — 3066F NEPHROPATHY DOC TX: CPT | Mod: CPTII,S$GLB,, | Performed by: INTERNAL MEDICINE

## 2023-07-11 PROCEDURE — 3074F PR MOST RECENT SYSTOLIC BLOOD PRESSURE < 130 MM HG: ICD-10-PCS | Mod: CPTII,S$GLB,, | Performed by: INTERNAL MEDICINE

## 2023-07-11 PROCEDURE — 3008F PR BODY MASS INDEX (BMI) DOCUMENTED: ICD-10-PCS | Mod: CPTII,S$GLB,, | Performed by: INTERNAL MEDICINE

## 2023-07-11 PROCEDURE — 99204 OFFICE O/P NEW MOD 45 MIN: CPT | Mod: S$GLB,,, | Performed by: INTERNAL MEDICINE

## 2023-07-11 PROCEDURE — 3078F PR MOST RECENT DIASTOLIC BLOOD PRESSURE < 80 MM HG: ICD-10-PCS | Mod: CPTII,S$GLB,, | Performed by: INTERNAL MEDICINE

## 2023-07-11 PROCEDURE — 76770 US EXAM ABDO BACK WALL COMP: CPT | Mod: 26,,, | Performed by: RADIOLOGY

## 2023-07-11 PROCEDURE — 1160F RVW MEDS BY RX/DR IN RCRD: CPT | Mod: CPTII,S$GLB,, | Performed by: INTERNAL MEDICINE

## 2023-07-11 PROCEDURE — 3078F DIAST BP <80 MM HG: CPT | Mod: CPTII,S$GLB,, | Performed by: INTERNAL MEDICINE

## 2023-07-11 PROCEDURE — 1101F PR PT FALLS ASSESS DOC 0-1 FALLS W/OUT INJ PAST YR: ICD-10-PCS | Mod: CPTII,S$GLB,, | Performed by: INTERNAL MEDICINE

## 2023-07-11 PROCEDURE — 1160F PR REVIEW ALL MEDS BY PRESCRIBER/CLIN PHARMACIST DOCUMENTED: ICD-10-PCS | Mod: CPTII,S$GLB,, | Performed by: INTERNAL MEDICINE

## 2023-07-11 PROCEDURE — 3288F FALL RISK ASSESSMENT DOCD: CPT | Mod: CPTII,S$GLB,, | Performed by: INTERNAL MEDICINE

## 2023-07-11 PROCEDURE — 3044F PR MOST RECENT HEMOGLOBIN A1C LEVEL <7.0%: ICD-10-PCS | Mod: CPTII,S$GLB,, | Performed by: INTERNAL MEDICINE

## 2023-07-11 PROCEDURE — 1159F MED LIST DOCD IN RCRD: CPT | Mod: CPTII,S$GLB,, | Performed by: INTERNAL MEDICINE

## 2023-07-11 PROCEDURE — 1101F PT FALLS ASSESS-DOCD LE1/YR: CPT | Mod: CPTII,S$GLB,, | Performed by: INTERNAL MEDICINE

## 2023-07-11 PROCEDURE — 99999 PR PBB SHADOW E&M-EST. PATIENT-LVL III: ICD-10-PCS | Mod: PBBFAC,,, | Performed by: INTERNAL MEDICINE

## 2023-07-11 PROCEDURE — 3044F HG A1C LEVEL LT 7.0%: CPT | Mod: CPTII,S$GLB,, | Performed by: INTERNAL MEDICINE

## 2023-07-11 PROCEDURE — 99999 PR PBB SHADOW E&M-EST. PATIENT-LVL III: CPT | Mod: PBBFAC,,, | Performed by: INTERNAL MEDICINE

## 2023-07-11 PROCEDURE — 1159F PR MEDICATION LIST DOCUMENTED IN MEDICAL RECORD: ICD-10-PCS | Mod: CPTII,S$GLB,, | Performed by: INTERNAL MEDICINE

## 2023-07-11 PROCEDURE — 3074F SYST BP LT 130 MM HG: CPT | Mod: CPTII,S$GLB,, | Performed by: INTERNAL MEDICINE

## 2023-07-11 PROCEDURE — 76770 US RETROPERITONEAL COMPLETE: ICD-10-PCS | Mod: 26,,, | Performed by: RADIOLOGY

## 2023-07-11 PROCEDURE — 3066F PR DOCUMENTATION OF TREATMENT FOR NEPHROPATHY: ICD-10-PCS | Mod: CPTII,S$GLB,, | Performed by: INTERNAL MEDICINE
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
## 2023-07-12 ENCOUNTER — TELEPHONE (OUTPATIENT)
Dept: NEPHROLOGY | Facility: CLINIC | Age: 68
End: 2023-07-12
Payer: MEDICARE

## 2023-07-12 NOTE — TELEPHONE ENCOUNTER
I spoke to patient's wife and she would like  to call on Monday to go over lab results.I am sending this message to .

## 2023-07-12 NOTE — TELEPHONE ENCOUNTER
----- Message from Jarek Anderson sent at 7/12/2023 12:47 PM CDT -----  Contact: self  Type: Needs Medical Advice  Who Called: Patient   Best Call Back Number: [phone]  Additional Information: Plz call pt back has some questions about results and lab work due to having kidney failure. Thanks

## 2023-08-04 RX ORDER — LISINOPRIL AND HYDROCHLOROTHIAZIDE 20; 25 MG/1; MG/1
TABLET ORAL
Qty: 90 TABLET | Refills: 0 | Status: SHIPPED | OUTPATIENT
Start: 2023-08-04 | End: 2023-10-10

## 2023-08-04 NOTE — TELEPHONE ENCOUNTER
"Shift [id number]  Neuro: A/Ox4 calls appropriately.  Respiratory: on room air denies SOB LS clear and coarse.   Cardiac: hypertensive, denies chest pain.  Diet: NPO with continuous tube feedings via J tube at a current rate of 55ml/hr.  GI/: +BS denies loose stools this shift, voiding adequately.  Incision/Drains: G/J tube, gastric tube to gravity drainage and J tube to continuous feeding. Pharyngostomy tube to LIS. Incision site from  R/L old chest tube. Redness/blister to incision site from previous pressure dressing.  IV Access: L/R PIV, left PIV TKO and R SL.  Pain: denies pain this shift.  Labs: reviewed  VS: BP (!) 147/80 (BP Location: Right arm)   Pulse 69   Temp 98.2  F (36.8  C) (Oral)   Resp 16   Ht 1.88 m (6' 2\")   Wt 102.1 kg (225 lb)   SpO2 98%   BMI 28.89 kg/m       Activity: SBA to bathroom, ambulated in hallway.  Plan:continue POC possible EDG in the morning.  " Lov 5/16/23

## 2023-08-18 ENCOUNTER — TELEPHONE (OUTPATIENT)
Dept: NEPHROLOGY | Facility: CLINIC | Age: 68
End: 2023-08-18
Payer: MEDICARE

## 2023-08-18 NOTE — TELEPHONE ENCOUNTER
Please contact patient to make appt to be evaluated for his symptoms. This patient last seen on 05/16/2023 bu HECTOR Guy and these seem to be new symptoms. Please get him in to be assessed.

## 2023-08-18 NOTE — TELEPHONE ENCOUNTER
asked me to speak with wife.     He is taking indocin (rx'd for gout) to appease the bilateral back pain just above his waist.   The indocin seems to help.  Denies any other s/s.      Advised to avoid NSAIDS if possible and try tylenol per package directions.     Advised will send this message to PCP, as he should follow up with PCP to address persistent back pain and they can assess him and r/o UTI  and direct them re:  what to do next.  Advised Dr Rojo is always available for consult and they can message each other.    Will also forward to Dr Rojo to see if he has further input.

## 2023-08-18 NOTE — TELEPHONE ENCOUNTER
----- Message from Evelia Chaudhry sent at 8/18/2023  4:06 PM CDT -----  Regarding: rt call  Type:  Patient Returning Call    Who Called:pt    Who Left Message for Patient:unknown    Does the patient know what this is regarding?:yes    Best Call Back Number:[phone]      Additional Information: pt st that he's having kidney pain on both sides. In wants to know how to treat pain till he gets to see dr. Please call to discuss.

## 2023-08-21 NOTE — TELEPHONE ENCOUNTER
Kidney function seems to be at baseline thus he can keep his current appointment with us.  He will need to see his PCP to evaluate his back pain.

## 2023-08-21 NOTE — TELEPHONE ENCOUNTER
Reached out to patient via phone to notify he will need a appointment to address new issues, vm left

## 2023-09-11 ENCOUNTER — OFFICE VISIT (OUTPATIENT)
Dept: PRIMARY CARE CLINIC | Facility: CLINIC | Age: 68
End: 2023-09-11
Payer: MEDICARE

## 2023-09-11 VITALS
RESPIRATION RATE: 18 BRPM | BODY MASS INDEX: 36.53 KG/M2 | HEART RATE: 60 BPM | OXYGEN SATURATION: 97 % | SYSTOLIC BLOOD PRESSURE: 100 MMHG | TEMPERATURE: 98 F | WEIGHT: 261.94 LBS | DIASTOLIC BLOOD PRESSURE: 60 MMHG

## 2023-09-11 DIAGNOSIS — E11.22 TYPE 2 DM WITH CKD STAGE 3 AND HYPERTENSION: Primary | ICD-10-CM

## 2023-09-11 DIAGNOSIS — E78.5 HYPERLIPIDEMIA, UNSPECIFIED HYPERLIPIDEMIA TYPE: ICD-10-CM

## 2023-09-11 DIAGNOSIS — N18.30 TYPE 2 DM WITH CKD STAGE 3 AND HYPERTENSION: Primary | ICD-10-CM

## 2023-09-11 DIAGNOSIS — I12.9 TYPE 2 DM WITH CKD STAGE 3 AND HYPERTENSION: Primary | ICD-10-CM

## 2023-09-11 LAB
ALBUMIN SERPL BCP-MCNC: 3.8 G/DL (ref 3.5–5.2)
ALP SERPL-CCNC: 56 U/L (ref 55–135)
ALT SERPL W/O P-5'-P-CCNC: 25 U/L (ref 10–44)
ANION GAP SERPL CALC-SCNC: 14 MMOL/L (ref 8–16)
AST SERPL-CCNC: 20 U/L (ref 10–40)
BASOPHILS # BLD AUTO: 0.04 K/UL (ref 0–0.2)
BASOPHILS NFR BLD: 0.6 % (ref 0–1.9)
BILIRUB SERPL-MCNC: 0.5 MG/DL (ref 0.1–1)
BUN SERPL-MCNC: 29 MG/DL (ref 8–23)
CALCIUM SERPL-MCNC: 9.6 MG/DL (ref 8.7–10.5)
CHLORIDE SERPL-SCNC: 108 MMOL/L (ref 95–110)
CHOLEST SERPL-MCNC: 100 MG/DL (ref 120–199)
CHOLEST/HDLC SERPL: 3.6 {RATIO} (ref 2–5)
CO2 SERPL-SCNC: 18 MMOL/L (ref 23–29)
CREAT SERPL-MCNC: 1.3 MG/DL (ref 0.5–1.4)
DIFFERENTIAL METHOD: ABNORMAL
EOSINOPHIL # BLD AUTO: 0.6 K/UL (ref 0–0.5)
EOSINOPHIL NFR BLD: 9.2 % (ref 0–8)
ERYTHROCYTE [DISTWIDTH] IN BLOOD BY AUTOMATED COUNT: 13 % (ref 11.5–14.5)
EST. GFR  (NO RACE VARIABLE): 59.1 ML/MIN/1.73 M^2
ESTIMATED AVG GLUCOSE: 114 MG/DL (ref 68–131)
GLUCOSE SERPL-MCNC: 98 MG/DL (ref 70–110)
HBA1C MFR BLD: 5.6 % (ref 4–5.6)
HCT VFR BLD AUTO: 40.7 % (ref 40–54)
HDLC SERPL-MCNC: 28 MG/DL (ref 40–75)
HDLC SERPL: 28 % (ref 20–50)
HGB BLD-MCNC: 13.4 G/DL (ref 14–18)
IMM GRANULOCYTES # BLD AUTO: 0.01 K/UL (ref 0–0.04)
IMM GRANULOCYTES NFR BLD AUTO: 0.1 % (ref 0–0.5)
LDLC SERPL CALC-MCNC: 49 MG/DL (ref 63–159)
LYMPHOCYTES # BLD AUTO: 2 K/UL (ref 1–4.8)
LYMPHOCYTES NFR BLD: 29.3 % (ref 18–48)
MCH RBC QN AUTO: 32.1 PG (ref 27–31)
MCHC RBC AUTO-ENTMCNC: 32.9 G/DL (ref 32–36)
MCV RBC AUTO: 98 FL (ref 82–98)
MONOCYTES # BLD AUTO: 0.6 K/UL (ref 0.3–1)
MONOCYTES NFR BLD: 8.7 % (ref 4–15)
NEUTROPHILS # BLD AUTO: 3.6 K/UL (ref 1.8–7.7)
NEUTROPHILS NFR BLD: 52.1 % (ref 38–73)
NONHDLC SERPL-MCNC: 72 MG/DL
NRBC BLD-RTO: 0 /100 WBC
PLATELET # BLD AUTO: 255 K/UL (ref 150–450)
PMV BLD AUTO: 10.7 FL (ref 9.2–12.9)
POTASSIUM SERPL-SCNC: 4.8 MMOL/L (ref 3.5–5.1)
PROT SERPL-MCNC: 7.6 G/DL (ref 6–8.4)
RBC # BLD AUTO: 4.17 M/UL (ref 4.6–6.2)
SODIUM SERPL-SCNC: 140 MMOL/L (ref 136–145)
T3FREE SERPL-MCNC: 2.7 PG/ML (ref 2.3–4.2)
TRIGL SERPL-MCNC: 115 MG/DL (ref 30–150)
TSH SERPL DL<=0.005 MIU/L-ACNC: 0.64 UIU/ML (ref 0.4–4)
URATE SERPL-MCNC: 12.5 MG/DL (ref 3.4–7)
WBC # BLD AUTO: 6.93 K/UL (ref 3.9–12.7)

## 2023-09-11 PROCEDURE — 1159F MED LIST DOCD IN RCRD: CPT | Mod: CPTII,S$GLB,, | Performed by: PHYSICIAN ASSISTANT

## 2023-09-11 PROCEDURE — 3066F NEPHROPATHY DOC TX: CPT | Mod: CPTII,S$GLB,, | Performed by: PHYSICIAN ASSISTANT

## 2023-09-11 PROCEDURE — 3288F PR FALLS RISK ASSESSMENT DOCUMENTED: ICD-10-PCS | Mod: CPTII,S$GLB,, | Performed by: PHYSICIAN ASSISTANT

## 2023-09-11 PROCEDURE — 1125F AMNT PAIN NOTED PAIN PRSNT: CPT | Mod: CPTII,S$GLB,, | Performed by: PHYSICIAN ASSISTANT

## 2023-09-11 PROCEDURE — 84550 ASSAY OF BLOOD/URIC ACID: CPT | Performed by: PHYSICIAN ASSISTANT

## 2023-09-11 PROCEDURE — 85025 COMPLETE CBC W/AUTO DIFF WBC: CPT | Performed by: PHYSICIAN ASSISTANT

## 2023-09-11 PROCEDURE — 3078F DIAST BP <80 MM HG: CPT | Mod: CPTII,S$GLB,, | Performed by: PHYSICIAN ASSISTANT

## 2023-09-11 PROCEDURE — 3288F FALL RISK ASSESSMENT DOCD: CPT | Mod: CPTII,S$GLB,, | Performed by: PHYSICIAN ASSISTANT

## 2023-09-11 PROCEDURE — 3008F PR BODY MASS INDEX (BMI) DOCUMENTED: ICD-10-PCS | Mod: CPTII,S$GLB,, | Performed by: PHYSICIAN ASSISTANT

## 2023-09-11 PROCEDURE — 1159F PR MEDICATION LIST DOCUMENTED IN MEDICAL RECORD: ICD-10-PCS | Mod: CPTII,S$GLB,, | Performed by: PHYSICIAN ASSISTANT

## 2023-09-11 PROCEDURE — 84481 FREE ASSAY (FT-3): CPT | Performed by: PHYSICIAN ASSISTANT

## 2023-09-11 PROCEDURE — 36415 PR COLLECTION VENOUS BLOOD,VENIPUNCTURE: ICD-10-PCS | Mod: S$GLB,,, | Performed by: PHYSICIAN ASSISTANT

## 2023-09-11 PROCEDURE — 3078F PR MOST RECENT DIASTOLIC BLOOD PRESSURE < 80 MM HG: ICD-10-PCS | Mod: CPTII,S$GLB,, | Performed by: PHYSICIAN ASSISTANT

## 2023-09-11 PROCEDURE — 83036 HEMOGLOBIN GLYCOSYLATED A1C: CPT | Performed by: PHYSICIAN ASSISTANT

## 2023-09-11 PROCEDURE — 4010F PR ACE/ARB THEARPY RXD/TAKEN: ICD-10-PCS | Mod: CPTII,S$GLB,, | Performed by: PHYSICIAN ASSISTANT

## 2023-09-11 PROCEDURE — 4010F ACE/ARB THERAPY RXD/TAKEN: CPT | Mod: CPTII,S$GLB,, | Performed by: PHYSICIAN ASSISTANT

## 2023-09-11 PROCEDURE — 36415 COLL VENOUS BLD VENIPUNCTURE: CPT | Mod: S$GLB,,, | Performed by: PHYSICIAN ASSISTANT

## 2023-09-11 PROCEDURE — 3044F PR MOST RECENT HEMOGLOBIN A1C LEVEL <7.0%: ICD-10-PCS | Mod: CPTII,S$GLB,, | Performed by: PHYSICIAN ASSISTANT

## 2023-09-11 PROCEDURE — 80053 COMPREHEN METABOLIC PANEL: CPT | Performed by: PHYSICIAN ASSISTANT

## 2023-09-11 PROCEDURE — 80061 LIPID PANEL: CPT | Performed by: PHYSICIAN ASSISTANT

## 2023-09-11 PROCEDURE — 1101F PT FALLS ASSESS-DOCD LE1/YR: CPT | Mod: CPTII,S$GLB,, | Performed by: PHYSICIAN ASSISTANT

## 2023-09-11 PROCEDURE — 1160F PR REVIEW ALL MEDS BY PRESCRIBER/CLIN PHARMACIST DOCUMENTED: ICD-10-PCS | Mod: CPTII,S$GLB,, | Performed by: PHYSICIAN ASSISTANT

## 2023-09-11 PROCEDURE — 1160F RVW MEDS BY RX/DR IN RCRD: CPT | Mod: CPTII,S$GLB,, | Performed by: PHYSICIAN ASSISTANT

## 2023-09-11 PROCEDURE — 84443 ASSAY THYROID STIM HORMONE: CPT | Performed by: PHYSICIAN ASSISTANT

## 2023-09-11 PROCEDURE — 99214 PR OFFICE/OUTPT VISIT, EST, LEVL IV, 30-39 MIN: ICD-10-PCS | Mod: S$GLB,,, | Performed by: PHYSICIAN ASSISTANT

## 2023-09-11 PROCEDURE — 1101F PR PT FALLS ASSESS DOC 0-1 FALLS W/OUT INJ PAST YR: ICD-10-PCS | Mod: CPTII,S$GLB,, | Performed by: PHYSICIAN ASSISTANT

## 2023-09-11 PROCEDURE — 3066F PR DOCUMENTATION OF TREATMENT FOR NEPHROPATHY: ICD-10-PCS | Mod: CPTII,S$GLB,, | Performed by: PHYSICIAN ASSISTANT

## 2023-09-11 PROCEDURE — 99214 OFFICE O/P EST MOD 30 MIN: CPT | Mod: S$GLB,,, | Performed by: PHYSICIAN ASSISTANT

## 2023-09-11 PROCEDURE — 3008F BODY MASS INDEX DOCD: CPT | Mod: CPTII,S$GLB,, | Performed by: PHYSICIAN ASSISTANT

## 2023-09-11 PROCEDURE — 3074F PR MOST RECENT SYSTOLIC BLOOD PRESSURE < 130 MM HG: ICD-10-PCS | Mod: CPTII,S$GLB,, | Performed by: PHYSICIAN ASSISTANT

## 2023-09-11 PROCEDURE — 3044F HG A1C LEVEL LT 7.0%: CPT | Mod: CPTII,S$GLB,, | Performed by: PHYSICIAN ASSISTANT

## 2023-09-11 PROCEDURE — 1125F PR PAIN SEVERITY QUANTIFIED, PAIN PRESENT: ICD-10-PCS | Mod: CPTII,S$GLB,, | Performed by: PHYSICIAN ASSISTANT

## 2023-09-11 PROCEDURE — 3074F SYST BP LT 130 MM HG: CPT | Mod: CPTII,S$GLB,, | Performed by: PHYSICIAN ASSISTANT

## 2023-09-11 RX ORDER — COLCHICINE 0.6 MG/1
0.6 TABLET ORAL 2 TIMES DAILY PRN
Qty: 60 TABLET | Refills: 11 | Status: SHIPPED | OUTPATIENT
Start: 2023-09-11 | End: 2024-09-10

## 2023-09-11 RX ORDER — TIZANIDINE 4 MG/1
4 TABLET ORAL EVERY 6 HOURS PRN
Qty: 30 TABLET | Refills: 0 | Status: SHIPPED | OUTPATIENT
Start: 2023-09-11 | End: 2023-09-21

## 2023-09-11 NOTE — PROGRESS NOTES
Subjective     Patient ID: Asim Wilson is a 68 y.o. male.    Chief Complaint: No chief complaint on file.    Diabetes  He presents for his follow-up diabetic visit. He has type 2 diabetes mellitus. His disease course has been stable. There are no hypoglycemic associated symptoms. Pertinent negatives for hypoglycemia include no dizziness, headaches, seizures or tremors. There are no diabetic associated symptoms. Pertinent negatives for diabetes include no chest pain, no fatigue and no weakness. There are no hypoglycemic complications. Symptoms are stable. Diabetic complications include heart disease and nephropathy. Risk factors for coronary artery disease include diabetes mellitus, dyslipidemia, family history, hypertension, male sex, obesity and sedentary lifestyle. Current diabetic treatment includes diet and oral agent (monotherapy). He is compliant with treatment some of the time. His weight is decreasing rapidly. He is following a generally healthy diet. Meal planning includes avoidance of concentrated sweets. He rarely participates in exercise. An ACE inhibitor/angiotensin II receptor blocker is being taken. He does not see a podiatrist.Eye exam is current.     Past Medical History:   Diagnosis Date    Arthritis     Asthma     Cataract     Done OU    Diabetic retinopathy     Gout attack     Hypertension     GERONIMO (obstructive sleep apnea)     noncompliant CPAP    Thyroid disease        Review of Systems   Constitutional:  Positive for activity change. Negative for fatigue and fever.   HENT: Negative.     Respiratory:  Negative for chest tightness, shortness of breath and wheezing.    Cardiovascular:  Negative for chest pain, palpitations and claudication.   Gastrointestinal:  Negative for abdominal pain, blood in stool, diarrhea and nausea.   Endocrine: Negative.    Genitourinary: Negative.    Musculoskeletal:  Negative for back pain.   Integumentary:  Negative.   Neurological:  Negative for dizziness,  tremors, seizures, weakness and headaches.          Objective     Physical Exam  Vitals reviewed.   Constitutional:       General: He is not in acute distress.     Appearance: Normal appearance. He is not ill-appearing, toxic-appearing or diaphoretic.   Cardiovascular:      Rate and Rhythm: Normal rate.      Pulses: Normal pulses.           Dorsalis pedis pulses are 2+ on the right side and 2+ on the left side.        Posterior tibial pulses are 2+ on the right side and 2+ on the left side.      Heart sounds: Normal heart sounds. No murmur heard.     No friction rub. No gallop.   Pulmonary:      Effort: Pulmonary effort is normal. No respiratory distress.      Breath sounds: Normal breath sounds. No stridor. No wheezing, rhonchi or rales.   Chest:      Chest wall: No tenderness.   Abdominal:      Palpations: Abdomen is soft.      Tenderness: There is no abdominal tenderness.   Musculoskeletal:      Cervical back: No rigidity.      Right foot: Normal range of motion. No deformity, bunion, Charcot foot, foot drop or prominent metatarsal heads.      Left foot: Normal range of motion. No deformity, bunion, Charcot foot, foot drop or prominent metatarsal heads.   Feet:      Right foot:      Protective Sensation: 9 sites tested.  9 sites sensed.      Skin integrity: Callus present.      Left foot:      Protective Sensation: 9 sites tested.  9 sites sensed.      Skin integrity: Callus present.   Lymphadenopathy:      Cervical: No cervical adenopathy.   Neurological:      Mental Status: He is alert.       Lab Results   Component Value Date    WBC 6.72 01/03/2023    HGB 13.1 (L) 01/03/2023    HCT 40.7 01/03/2023    MCV 98 01/03/2023     01/03/2023     CMP  Sodium   Date Value Ref Range Status   07/11/2023 137 136 - 145 mmol/L Final     Potassium   Date Value Ref Range Status   07/11/2023 4.5 3.5 - 5.1 mmol/L Final     Chloride   Date Value Ref Range Status   07/11/2023 103 95 - 110 mmol/L Final     CO2   Date Value  Ref Range Status   07/11/2023 25 23 - 29 mmol/L Final     Glucose   Date Value Ref Range Status   07/11/2023 83 70 - 110 mg/dL Final     BUN   Date Value Ref Range Status   07/11/2023 14 8 - 23 mg/dL Final     Creatinine   Date Value Ref Range Status   07/11/2023 1.4 0.5 - 1.4 mg/dL Final     Calcium   Date Value Ref Range Status   07/11/2023 9.6 8.7 - 10.5 mg/dL Final     Total Protein   Date Value Ref Range Status   02/10/2023 7.4 6.0 - 8.4 g/dL Final     Albumin   Date Value Ref Range Status   07/11/2023 3.4 (L) 3.5 - 5.2 g/dL Final   10/14/2020 3.9 3.6 - 5.1 g/dL Final     Comment:     For additional information, please refer to   http://education.[employer]Works/faq/LEZ454 (This link is   being provided for informational/ educational purposes only.)  This test was developed and its analytical performance   characteristics have been determined by [employer]  [facility]. It has not been cleared or approved by the   [place] Food and Drug Administration. This assay has been validated   pursuant to the CLIA regulations and is used for clinical   purposes.  @ Test Performed By:  [employer] [place]  Angelo Moreau M.D.,   [address] [place] [id number]  IA  [id number]       Total Bilirubin   Date Value Ref Range Status   02/10/2023 0.4 0.1 - 1.0 mg/dL Final     Comment:     For infants and newborns, interpretation of results should be based  on gestational age, weight and in agreement with clinical  observations.    Premature Infant recommended reference ranges:  Up to 24 hours.............<8.0 mg/dL  Up to 48 hours............<12.0 mg/dL  3-5 days..................<15.0 mg/dL  6-29 days.................<15.0 mg/dL       Alkaline Phosphatase   Date Value Ref Range Status   02/10/2023 60 55 - 135 U/L Final     AST   Date Value Ref Range Status   02/10/2023 25 10 - 40 U/L Final     ALT   Date Value Ref Range Status   02/10/2023 33 10 - 44 U/L  Final     Anion Gap   Date Value Ref Range Status   07/11/2023 9 8 - 16 mmol/L Final     eGFR if    Date Value Ref Range Status   06/23/2022 59.3 (A) >60 mL/min/1.73 m^2 Final     eGFR if non    Date Value Ref Range Status   06/23/2022 51.3 (A) >60 mL/min/1.73 m^2 Final     Comment:     Calculation used to obtain the estimated glomerular filtration  rate (eGFR) is the CKD-EPI equation.        Lab Results   Component Value Date    CHOL 101 (L) 01/03/2023     Lab Results   Component Value Date    HDL 29 (L) 01/03/2023     Lab Results   Component Value Date    LDLCALC 42.0 (L) 01/03/2023     Lab Results   Component Value Date    TRIG 150 01/03/2023     Lab Results   Component Value Date    CHOLHDL 28.7 01/03/2023     Lab Results   Component Value Date    HGBA1C 5.7 (H) 02/10/2023       Stop metformin stop indocin      Assessment and Plan     1. Type 2 DM with CKD stage 3 and hypertension  -     Comprehensive Metabolic Panel; Future; Expected date: 09/11/2023  -     Hemoglobin A1C; Future; Expected date: 09/11/2023  -     CBC Auto Differential; Future; Expected date: 09/11/2023  -     TSH; Future; Expected date: 09/11/2023  -     T3, Free; Future; Expected date: 09/11/2023  -     Uric Acid; Future; Expected date: 09/11/2023  -     Lipid Panel; Future; Expected date: 09/11/2023    2. Hyperlipidemia, unspecified hyperlipidemia type    Other orders  -     tiZANidine (ZANAFLEX) 4 MG tablet; Take 1 tablet (4 mg total) by mouth every 6 (six) hours as needed (acute back pain).  Dispense: 30 tablet; Refill: 0  -     colchicine, gout, (COLCRYS) 0.6 mg tablet; Take 1 tablet (0.6 mg total) by mouth 2 (two) times daily as needed (acute gout).  Dispense: 60 tablet; Refill: 11      I spent 30 minutes on this encounter, time includes face-to-face, chart review, documentation, test review and orders.           Follow up in about 3 months (around 12/11/2023).

## 2023-09-12 ENCOUNTER — TELEPHONE (OUTPATIENT)
Dept: PRIMARY CARE CLINIC | Facility: CLINIC | Age: 68
End: 2023-09-12
Payer: MEDICARE

## 2023-09-12 NOTE — TELEPHONE ENCOUNTER
----- Message from Naren Guy III, PA-C sent at 9/12/2023  7:04 AM CDT -----  Asim Wilson     I reviewed your lab work. The uric acid level is very high. I recommend to stay away from the foods that increase this. However, the rest of the lab has improved.

## 2023-09-14 ENCOUNTER — TELEPHONE (OUTPATIENT)
Dept: NEPHROLOGY | Facility: CLINIC | Age: 68
End: 2023-09-14

## 2023-09-14 ENCOUNTER — TELEPHONE (OUTPATIENT)
Dept: NEPHROLOGY | Facility: CLINIC | Age: 68
End: 2023-09-14
Payer: MEDICARE

## 2023-09-14 RX ORDER — INDOMETHACIN 50 MG/1
50 CAPSULE ORAL
COMMUNITY
End: 2023-10-02

## 2023-09-14 NOTE — TELEPHONE ENCOUNTER
Allopurinol daily  Indocin when he has a flare is that ok?  THe colchicine is expensive.  Please advise.   He usually just takes one, rather than 3 day and only if he really needs it.     You can answer via [facility] [employer] or we can call .

## 2023-09-14 NOTE — TELEPHONE ENCOUNTER
----- Message from Seble Baez sent at 9/14/2023  9:10 AM CDT -----  Regarding: medication questions  Contact: Gabi  Type:  Needs Medical Advice    Who Called: Gabi spouse    Would the patient rather a call back or a response via [facility]? Call back    Best Call Back Number: [phone]    Additional Information: sts she needs to speak to the nurse regarding his medication--please advise  and thank you

## 2023-09-20 DIAGNOSIS — E11.9 TYPE 2 DIABETES MELLITUS WITHOUT COMPLICATION: ICD-10-CM

## 2023-09-28 ENCOUNTER — OFFICE VISIT (OUTPATIENT)
Dept: PAIN MEDICINE | Facility: CLINIC | Age: 68
End: 2023-09-28
Payer: MEDICARE

## 2023-09-28 ENCOUNTER — HOSPITAL ENCOUNTER (OUTPATIENT)
Dept: RADIOLOGY | Facility: HOSPITAL | Age: 68
Discharge: HOME | End: 2023-09-28
Attending: ANESTHESIOLOGY
Payer: MEDICARE

## 2023-09-28 VITALS
WEIGHT: 255.63 LBS | DIASTOLIC BLOOD PRESSURE: 58 MMHG | RESPIRATION RATE: 18 BRPM | HEIGHT: 71 IN | SYSTOLIC BLOOD PRESSURE: 107 MMHG | BODY MASS INDEX: 35.79 KG/M2 | HEART RATE: 69 BPM

## 2023-09-28 DIAGNOSIS — M47.816 LUMBAR SPONDYLOSIS: ICD-10-CM

## 2023-09-28 DIAGNOSIS — M79.18 MYOFASCIAL PAIN: ICD-10-CM

## 2023-09-28 DIAGNOSIS — M54.9 DORSALGIA: Primary | ICD-10-CM

## 2023-09-28 DIAGNOSIS — M54.9 DORSALGIA: ICD-10-CM

## 2023-09-28 PROCEDURE — 99204 PR OFFICE/OUTPT VISIT, NEW, LEVL IV, 45-59 MIN: ICD-10-PCS | Mod: S$GLB,,, | Performed by: ANESTHESIOLOGY

## 2023-09-28 PROCEDURE — 1160F RVW MEDS BY RX/DR IN RCRD: CPT | Mod: CPTII,S$GLB,, | Performed by: ANESTHESIOLOGY

## 2023-09-28 PROCEDURE — 99999 PR PBB SHADOW E&M-EST. PATIENT-LVL IV: CPT | Mod: PBBFAC,,, | Performed by: ANESTHESIOLOGY

## 2023-09-28 PROCEDURE — 3074F PR MOST RECENT SYSTOLIC BLOOD PRESSURE < 130 MM HG: ICD-10-PCS | Mod: CPTII,S$GLB,, | Performed by: ANESTHESIOLOGY

## 2023-09-28 PROCEDURE — 1101F PR PT FALLS ASSESS DOC 0-1 FALLS W/OUT INJ PAST YR: ICD-10-PCS | Mod: CPTII,S$GLB,, | Performed by: ANESTHESIOLOGY

## 2023-09-28 PROCEDURE — 1159F MED LIST DOCD IN RCRD: CPT | Mod: CPTII,S$GLB,, | Performed by: ANESTHESIOLOGY

## 2023-09-28 PROCEDURE — 3008F BODY MASS INDEX DOCD: CPT | Mod: CPTII,S$GLB,, | Performed by: ANESTHESIOLOGY

## 2023-09-28 PROCEDURE — 4010F PR ACE/ARB THEARPY RXD/TAKEN: ICD-10-PCS | Mod: CPTII,S$GLB,, | Performed by: ANESTHESIOLOGY

## 2023-09-28 PROCEDURE — 3066F PR DOCUMENTATION OF TREATMENT FOR NEPHROPATHY: ICD-10-PCS | Mod: CPTII,S$GLB,, | Performed by: ANESTHESIOLOGY

## 2023-09-28 PROCEDURE — 3066F NEPHROPATHY DOC TX: CPT | Mod: CPTII,S$GLB,, | Performed by: ANESTHESIOLOGY

## 2023-09-28 PROCEDURE — 72114 XR LUMBAR SPINE 5 VIEW WITH FLEX AND EXT: ICD-10-PCS | Mod: 26,,, | Performed by: RADIOLOGY

## 2023-09-28 PROCEDURE — 1101F PT FALLS ASSESS-DOCD LE1/YR: CPT | Mod: CPTII,S$GLB,, | Performed by: ANESTHESIOLOGY

## 2023-09-28 PROCEDURE — 3074F SYST BP LT 130 MM HG: CPT | Mod: CPTII,S$GLB,, | Performed by: ANESTHESIOLOGY

## 2023-09-28 PROCEDURE — 72114 X-RAY EXAM L-S SPINE BENDING: CPT | Mod: TC,PO

## 2023-09-28 PROCEDURE — 3008F PR BODY MASS INDEX (BMI) DOCUMENTED: ICD-10-PCS | Mod: CPTII,S$GLB,, | Performed by: ANESTHESIOLOGY

## 2023-09-28 PROCEDURE — 3044F HG A1C LEVEL LT 7.0%: CPT | Mod: CPTII,S$GLB,, | Performed by: ANESTHESIOLOGY

## 2023-09-28 PROCEDURE — 1125F AMNT PAIN NOTED PAIN PRSNT: CPT | Mod: CPTII,S$GLB,, | Performed by: ANESTHESIOLOGY

## 2023-09-28 PROCEDURE — 3288F FALL RISK ASSESSMENT DOCD: CPT | Mod: CPTII,S$GLB,, | Performed by: ANESTHESIOLOGY

## 2023-09-28 PROCEDURE — 1160F PR REVIEW ALL MEDS BY PRESCRIBER/CLIN PHARMACIST DOCUMENTED: ICD-10-PCS | Mod: CPTII,S$GLB,, | Performed by: ANESTHESIOLOGY

## 2023-09-28 PROCEDURE — 3044F PR MOST RECENT HEMOGLOBIN A1C LEVEL <7.0%: ICD-10-PCS | Mod: CPTII,S$GLB,, | Performed by: ANESTHESIOLOGY

## 2023-09-28 PROCEDURE — 3078F PR MOST RECENT DIASTOLIC BLOOD PRESSURE < 80 MM HG: ICD-10-PCS | Mod: CPTII,S$GLB,, | Performed by: ANESTHESIOLOGY

## 2023-09-28 PROCEDURE — 3288F PR FALLS RISK ASSESSMENT DOCUMENTED: ICD-10-PCS | Mod: CPTII,S$GLB,, | Performed by: ANESTHESIOLOGY

## 2023-09-28 PROCEDURE — 99999 PR PBB SHADOW E&M-EST. PATIENT-LVL IV: ICD-10-PCS | Mod: PBBFAC,,, | Performed by: ANESTHESIOLOGY

## 2023-09-28 PROCEDURE — 72114 X-RAY EXAM L-S SPINE BENDING: CPT | Mod: 26,,, | Performed by: RADIOLOGY

## 2023-09-28 PROCEDURE — 4010F ACE/ARB THERAPY RXD/TAKEN: CPT | Mod: CPTII,S$GLB,, | Performed by: ANESTHESIOLOGY

## 2023-09-28 PROCEDURE — 1125F PR PAIN SEVERITY QUANTIFIED, PAIN PRESENT: ICD-10-PCS | Mod: CPTII,S$GLB,, | Performed by: ANESTHESIOLOGY

## 2023-09-28 PROCEDURE — 99204 OFFICE O/P NEW MOD 45 MIN: CPT | Mod: S$GLB,,, | Performed by: ANESTHESIOLOGY

## 2023-09-28 PROCEDURE — 1159F PR MEDICATION LIST DOCUMENTED IN MEDICAL RECORD: ICD-10-PCS | Mod: CPTII,S$GLB,, | Performed by: ANESTHESIOLOGY

## 2023-09-28 PROCEDURE — 3078F DIAST BP <80 MM HG: CPT | Mod: CPTII,S$GLB,, | Performed by: ANESTHESIOLOGY

## 2023-09-28 RX ORDER — METHOCARBAMOL 500 MG/1
500 TABLET, FILM COATED ORAL 2 TIMES DAILY PRN
Qty: 20 TABLET | Refills: 0 | Status: SHIPPED | OUTPATIENT
Start: 2023-09-28 | End: 2024-02-12

## 2023-10-02 ENCOUNTER — TELEPHONE (OUTPATIENT)
Dept: NEPHROLOGY | Facility: CLINIC | Age: 68
End: 2023-10-02
Payer: MEDICARE

## 2023-10-02 ENCOUNTER — OFFICE VISIT (OUTPATIENT)
Dept: PRIMARY CARE CLINIC | Facility: CLINIC | Age: 68
End: 2023-10-02
Payer: MEDICARE

## 2023-10-02 DIAGNOSIS — K57.92 DIVERTICULITIS: Primary | ICD-10-CM

## 2023-10-02 PROCEDURE — 3044F PR MOST RECENT HEMOGLOBIN A1C LEVEL <7.0%: ICD-10-PCS | Mod: CPTII,95,, | Performed by: PHYSICIAN ASSISTANT

## 2023-10-02 PROCEDURE — 4010F ACE/ARB THERAPY RXD/TAKEN: CPT | Mod: CPTII,95,, | Performed by: PHYSICIAN ASSISTANT

## 2023-10-02 PROCEDURE — 3044F HG A1C LEVEL LT 7.0%: CPT | Mod: CPTII,95,, | Performed by: PHYSICIAN ASSISTANT

## 2023-10-02 PROCEDURE — 99442 PR PHYSICIAN TELEPHONE EVALUATION 11-20 MIN: ICD-10-PCS | Mod: 95,,, | Performed by: PHYSICIAN ASSISTANT

## 2023-10-02 PROCEDURE — 3066F PR DOCUMENTATION OF TREATMENT FOR NEPHROPATHY: ICD-10-PCS | Mod: CPTII,95,, | Performed by: PHYSICIAN ASSISTANT

## 2023-10-02 PROCEDURE — 3066F NEPHROPATHY DOC TX: CPT | Mod: CPTII,95,, | Performed by: PHYSICIAN ASSISTANT

## 2023-10-02 PROCEDURE — 4010F PR ACE/ARB THEARPY RXD/TAKEN: ICD-10-PCS | Mod: CPTII,95,, | Performed by: PHYSICIAN ASSISTANT

## 2023-10-02 PROCEDURE — 99442 PR PHYSICIAN TELEPHONE EVALUATION 11-20 MIN: CPT | Mod: 95,,, | Performed by: PHYSICIAN ASSISTANT

## 2023-10-02 RX ORDER — METRONIDAZOLE 500 MG/1
500 TABLET ORAL 3 TIMES DAILY
Qty: 30 TABLET | Refills: 0 | Status: SHIPPED | OUTPATIENT
Start: 2023-10-02 | End: 2023-11-08

## 2023-10-02 NOTE — PROGRESS NOTES
Answers submitted by the patient for this visit:  High Blood Pressure Questionnaire (Submitted on 10/2/2023)  Chief Complaint: Hypertension  Chronicity: new  Onset: more than 1 year ago  Progression since onset: resolved  Condition status: controlled  anxiety: No  blurred vision: No  chest pain: No  headaches: No  malaise/fatigue: No  neck pain: No  orthopnea: No  palpitations: No  peripheral edema: Yes  PND: No  shortness of breath: No  sweats: No  Agents associated with hypertension: thyroid hormones  CAD risks: no known risk factors  Compliance problems: diet  Past treatments: lifestyle changes  Improvement on treatment: no improvement

## 2023-10-02 NOTE — TELEPHONE ENCOUNTER
----- Message from Marquise Maldonado sent at 10/2/2023 12:21 PM CDT -----  Contact: Mom  Type: Needs Medical Advice  Who Called:  Gabi/Wife     Best Call Back Number: [phone]    Additional Information: Wife states she would like to speak with office regarding pt going to ER on yesterday says he is having symptoms of diarrhea since Friday is concerned. Please call back

## 2023-10-02 NOTE — TELEPHONE ENCOUNTER
Patient has had diarrhea since last Friday. Patient also went to [facility] ed. He has labs in the chart review. Patient can not straighten his left leg  and in severe pain.[facility] put him on an antibiotic and his wife wants to know what he can take for the diarrhea.

## 2023-10-02 NOTE — TELEPHONE ENCOUNTER
----- Message from Jordi Joiner sent at 10/2/2023  9:49 AM CDT -----  Contact: Spouse/Gabi  Type: Needs Medical Advice  Who Called:  Spouse/Gabi    Best Call Back Number: [phone]  Additional Information:  Called to speak with office regarding pt's ED visit, diarrhea symptoms. Please call.

## 2023-10-02 NOTE — TELEPHONE ENCOUNTER
Patient went to ER last night.  For severe pain.     Has had non-stop diarrhea since Friday.     He was given Augmentin in the ER last night.  (OK per Dr Rojo.)    Appt given with PCP for ER Follow up    APpt with [facility] on Thursday.     Requesting recent [facility] discharge summary and labs from past few weeks.

## 2023-10-02 NOTE — PROGRESS NOTES
Subjective     Patient ID: Asim Wilson is a 68 y.o. male.    Chief Complaint: No chief complaint on file.    The patient location is: [place], [place]  The chief complaint leading to consultation is: Abdominal pain    Visit type: audio only    Face to Face time with patient: 10 minutes of total time spent on the encounter, which includes face to face time and non-face to face time preparing to see the patient (eg, review of tests), Obtaining and/or reviewing separately obtained history, Documenting clinical information in the electronic or other health record, Independently interpreting results (not separately reported) and communicating results to the patient/family/caregiver, or Care coordination (not separately reported).         Each patient to whom he or she provides medical services by telemedicine is:  (1) informed of the relationship between the physician and patient and the respective role of any other health care provider with respect to management of the patient; and (2) notified that he or she may decline to receive medical services by telemedicine and may withdraw from such care at any time.    Notes: Patient seen at [facility] ER for abdominal pain. CT scan abnormal.         Review of Systems   Gastrointestinal:  Positive for abdominal pain.          Objective     Physical Exam  CT Renal Stone Study ABD Pelvis WO  Order: [id number]  Status: Final result     Visible to patient: Yes (not seen)     Next appt: 10/03/2023 at 03:00 PM in Primary Care (Naren Guy III, PA-C)     0 Result Notes  Details    Reading Physician Reading Date Result Priority   Davin Belcher MD  [phone] 10/1/2023 STAT     Narrative & Impression  EXAMINATION:  CT RENAL STONE STUDY ABD PELVIS WO     CLINICAL HISTORY:  Flank pain, kidney stone suspected; .     TECHNIQUE:  Axial CT slice through the abdomen and pelvis were obtained without the administration of intravenous contrast. Total DLP for the study is approximately 1149  mGy-cm. Automated exposure control was utilized.     COMPARISON:  None     FINDINGS:  The visualized lung bases demonstrate mild dependent atelectasis.  Calcified granuloma in the right lower lobe is noted.  The assessment of the intra-abdominal organs and bowel loops is limited in the absence of contrast.     The liver demonstrates a tiny punctate calcification in the left hepatic lobe which is likely related to prior granulomatous disease.  The spleen demonstrates small calcification, likely related to prior granulomatous disease.  The noncontrast appearance of the liver, spleen, pancreas, gallbladder, and adrenal glands otherwise appears to be within normal limits.  No evidence of nephrolithiasis, hydronephrosis, or hydroureter is visualized.  Bilateral perinephric fat stranding is seen which is nonspecific, possibly related to medical-renal disease.  The urinary bladder is decompressed.  There is bladder wall thickening which may be related to decompression, but bladder pathology including an element of nonspecific cystitis cannot be excluded.  Please correlate with patient's clinical and laboratory findings with further evaluation including cystoscopy as warranted.     Portions of the small and large bowel are underdistended which along with lack of oral contrast limits assessment.  No dilated loops of bowel suggestive of high-grade obstruction are seen.  The appendix appears to be normal in caliber (for example series 5, images 283-357).  There are areas of colonic wall thickening throughout the colon which are likely related to underdistention as there is no significant pericolonic fat stranding about the majority of the colon.  However, there is evidence of subtle stranding about diverticula along the posterior margin of the proximal sigmoid colon (for example series 5, image 338).  This is concerning for focal colitis, likely related to acute diverticulitis.  Recommend follow-up to confirm resolution.  No  free air or free fluid is visualized.  Abdominal-pelvic atherosclerosis is seen.  The stomach is underdistended.  There appear to be enlarged right inguinal and pelvic lymph nodes.  For example, right inguinal lymph node measuring approximately 17 mm in short axis dimension is seen on image 417 of series 5.  For example, right external iliac chain lymph node measuring 17 mm in short axis is seen on image 358 of series 5..  For example, right external iliac chain lymph node measuring 15 mm in short axis on image 402 of series 5 is seen.  The visualized osseous structures appear demineralized.  Degenerative changes affect the visualized spine.  Is visualized.     Impression:     1. No evidence of nephrolithiasis, hydronephrosis, or hydroureter is visualized.  There is grossly symmetric bilateral perinephric stranding which is nonspecific, possibly related to medical-renal disease.  Infectious or inflammatory process cannot be excluded.  Please correlate with patient's clinical and laboratory findings.  2. The urinary bladder is underdistended.  There is bladder wall thickening which could be related to underdistention, but bladder pathology including an element of nonspecific cystitis cannot be excluded.  Please correlate with patient's clinical and laboratory findings with further evaluation including cystoscopy as warranted.  3. There are nonspecific enlarged right inguinal and right external iliac chain lymph nodes.  These lymph nodes are indeterminate, possibly reactive or metastatic.  4. There are areas of colonic wall thickening throughout the colon which are likely related to underdistention as there is no significant pericolonic fat stranding about the majority of the colon.  However, there is evidence of focal fat stranding about diverticula along the posterior margin of the proximal sigmoid colon which raises suspicion for focal colitis, likely related to acute diverticulitis.  Follow-up to confirm  resolution is recommended.  5. Additional findings and details as above.        Electronically signed by: Davin Belcher MD  Date:                                            10/01/2023        Assessment and Plan     1. Diverticulitis  - add   metroNIDAZOLE (FLAGYL) 500 MG tablet; Take 1 tablet (500 mg total) by mouth 3 (three) times daily.  Dispense: 30 tablet; Refill: 0      Continue with antibiotics and new medicines until gone. May take tylenol PRN fever. Increase fluids and rest. Suggest togo to the Emergency Room if symptoms get much worse. Otherwise follow up with your PCP tomorrow

## 2023-10-03 ENCOUNTER — TELEPHONE (OUTPATIENT)
Dept: PRIMARY CARE CLINIC | Facility: CLINIC | Age: 68
End: 2023-10-03

## 2023-10-03 NOTE — TELEPHONE ENCOUNTER
----- Message from Margie Patel sent at 10/3/2023  9:33 AM CDT -----  Contact: self  Type:  Needs Medical Advice    Who Called:  Pt   Symptoms (please be specific):  Pt need to know which hospitals Dr. Guy is affiliated with, pt need to be admitted ASAP...       Would the patient rather a call back or a response via [facility]? Call   Best Call Back Number:  [phone]  Please call as soon as possible... Thank you...

## 2023-10-03 NOTE — TELEPHONE ENCOUNTER
Left leg swollen and pt unable to stand on it. Pain is radiating to abdomen. Pt wife states they are going to [facility]. No other concerns were voiced

## 2023-10-10 ENCOUNTER — OFFICE VISIT (OUTPATIENT)
Dept: PRIMARY CARE CLINIC | Facility: CLINIC | Age: 68
End: 2023-10-10
Payer: MEDICARE

## 2023-10-10 ENCOUNTER — LAB VISIT (OUTPATIENT)
Dept: PRIMARY CARE CLINIC | Facility: CLINIC | Age: 68
End: 2023-10-10
Payer: MEDICARE

## 2023-10-10 VITALS
HEART RATE: 66 BPM | SYSTOLIC BLOOD PRESSURE: 114 MMHG | BODY MASS INDEX: 34.78 KG/M2 | OXYGEN SATURATION: 96 % | WEIGHT: 249.31 LBS | DIASTOLIC BLOOD PRESSURE: 68 MMHG

## 2023-10-10 DIAGNOSIS — E11.22 TYPE 2 DM WITH CKD STAGE 3 AND HYPERTENSION: ICD-10-CM

## 2023-10-10 DIAGNOSIS — N17.9 AKI (ACUTE KIDNEY INJURY): ICD-10-CM

## 2023-10-10 DIAGNOSIS — K57.92 DIVERTICULITIS: Primary | ICD-10-CM

## 2023-10-10 DIAGNOSIS — N18.30 TYPE 2 DM WITH CKD STAGE 3 AND HYPERTENSION: ICD-10-CM

## 2023-10-10 DIAGNOSIS — E11.9 TYPE 2 DIABETES MELLITUS WITHOUT COMPLICATION: ICD-10-CM

## 2023-10-10 DIAGNOSIS — I12.9 TYPE 2 DM WITH CKD STAGE 3 AND HYPERTENSION: ICD-10-CM

## 2023-10-10 LAB
ALBUMIN/CREAT UR: 9.8 UG/MG (ref 0–30)
CREAT UR-MCNC: 450 MG/DL (ref 23–375)
CREAT UR-MCNC: 450 MG/DL (ref 23–375)
MICROALBUMIN UR DL<=1MG/L-MCNC: 44 UG/ML
PROT UR-MCNC: 19 MG/DL (ref 0–15)
PROT/CREAT UR: 0.04 MG/G{CREAT} (ref 0–0.2)

## 2023-10-10 PROCEDURE — 1159F MED LIST DOCD IN RCRD: CPT | Mod: CPTII,S$GLB,, | Performed by: PHYSICIAN ASSISTANT

## 2023-10-10 PROCEDURE — 3044F HG A1C LEVEL LT 7.0%: CPT | Mod: CPTII,S$GLB,, | Performed by: PHYSICIAN ASSISTANT

## 2023-10-10 PROCEDURE — 99214 OFFICE O/P EST MOD 30 MIN: CPT | Mod: S$GLB,,, | Performed by: PHYSICIAN ASSISTANT

## 2023-10-10 PROCEDURE — 80069 RENAL FUNCTION PANEL: CPT | Performed by: INTERNAL MEDICINE

## 2023-10-10 PROCEDURE — 3008F BODY MASS INDEX DOCD: CPT | Mod: CPTII,S$GLB,, | Performed by: PHYSICIAN ASSISTANT

## 2023-10-10 PROCEDURE — 3066F PR DOCUMENTATION OF TREATMENT FOR NEPHROPATHY: ICD-10-PCS | Mod: CPTII,S$GLB,, | Performed by: PHYSICIAN ASSISTANT

## 2023-10-10 PROCEDURE — 4010F PR ACE/ARB THEARPY RXD/TAKEN: ICD-10-PCS | Mod: CPTII,S$GLB,, | Performed by: PHYSICIAN ASSISTANT

## 2023-10-10 PROCEDURE — 3288F PR FALLS RISK ASSESSMENT DOCUMENTED: ICD-10-PCS | Mod: CPTII,S$GLB,, | Performed by: PHYSICIAN ASSISTANT

## 2023-10-10 PROCEDURE — 1125F PR PAIN SEVERITY QUANTIFIED, PAIN PRESENT: ICD-10-PCS | Mod: CPTII,S$GLB,, | Performed by: PHYSICIAN ASSISTANT

## 2023-10-10 PROCEDURE — 82043 UR ALBUMIN QUANTITATIVE: CPT | Performed by: PHYSICIAN ASSISTANT

## 2023-10-10 PROCEDURE — 3008F PR BODY MASS INDEX (BMI) DOCUMENTED: ICD-10-PCS | Mod: CPTII,S$GLB,, | Performed by: PHYSICIAN ASSISTANT

## 2023-10-10 PROCEDURE — 1101F PR PT FALLS ASSESS DOC 0-1 FALLS W/OUT INJ PAST YR: ICD-10-PCS | Mod: CPTII,S$GLB,, | Performed by: PHYSICIAN ASSISTANT

## 2023-10-10 PROCEDURE — 1160F PR REVIEW ALL MEDS BY PRESCRIBER/CLIN PHARMACIST DOCUMENTED: ICD-10-PCS | Mod: CPTII,S$GLB,, | Performed by: PHYSICIAN ASSISTANT

## 2023-10-10 PROCEDURE — 99214 PR OFFICE/OUTPT VISIT, EST, LEVL IV, 30-39 MIN: ICD-10-PCS | Mod: S$GLB,,, | Performed by: PHYSICIAN ASSISTANT

## 2023-10-10 PROCEDURE — 3078F DIAST BP <80 MM HG: CPT | Mod: CPTII,S$GLB,, | Performed by: PHYSICIAN ASSISTANT

## 2023-10-10 PROCEDURE — 3288F FALL RISK ASSESSMENT DOCD: CPT | Mod: CPTII,S$GLB,, | Performed by: PHYSICIAN ASSISTANT

## 2023-10-10 PROCEDURE — 84550 ASSAY OF BLOOD/URIC ACID: CPT | Performed by: INTERNAL MEDICINE

## 2023-10-10 PROCEDURE — 3074F PR MOST RECENT SYSTOLIC BLOOD PRESSURE < 130 MM HG: ICD-10-PCS | Mod: CPTII,S$GLB,, | Performed by: PHYSICIAN ASSISTANT

## 2023-10-10 PROCEDURE — 3066F NEPHROPATHY DOC TX: CPT | Mod: CPTII,S$GLB,, | Performed by: PHYSICIAN ASSISTANT

## 2023-10-10 PROCEDURE — 1160F RVW MEDS BY RX/DR IN RCRD: CPT | Mod: CPTII,S$GLB,, | Performed by: PHYSICIAN ASSISTANT

## 2023-10-10 PROCEDURE — 3074F SYST BP LT 130 MM HG: CPT | Mod: CPTII,S$GLB,, | Performed by: PHYSICIAN ASSISTANT

## 2023-10-10 PROCEDURE — 3044F PR MOST RECENT HEMOGLOBIN A1C LEVEL <7.0%: ICD-10-PCS | Mod: CPTII,S$GLB,, | Performed by: PHYSICIAN ASSISTANT

## 2023-10-10 PROCEDURE — 1125F AMNT PAIN NOTED PAIN PRSNT: CPT | Mod: CPTII,S$GLB,, | Performed by: PHYSICIAN ASSISTANT

## 2023-10-10 PROCEDURE — 3078F PR MOST RECENT DIASTOLIC BLOOD PRESSURE < 80 MM HG: ICD-10-PCS | Mod: CPTII,S$GLB,, | Performed by: PHYSICIAN ASSISTANT

## 2023-10-10 PROCEDURE — 1159F PR MEDICATION LIST DOCUMENTED IN MEDICAL RECORD: ICD-10-PCS | Mod: CPTII,S$GLB,, | Performed by: PHYSICIAN ASSISTANT

## 2023-10-10 PROCEDURE — 84156 ASSAY OF PROTEIN URINE: CPT | Performed by: INTERNAL MEDICINE

## 2023-10-10 PROCEDURE — 1101F PT FALLS ASSESS-DOCD LE1/YR: CPT | Mod: CPTII,S$GLB,, | Performed by: PHYSICIAN ASSISTANT

## 2023-10-10 PROCEDURE — 4010F ACE/ARB THERAPY RXD/TAKEN: CPT | Mod: CPTII,S$GLB,, | Performed by: PHYSICIAN ASSISTANT

## 2023-10-10 PROCEDURE — 83970 ASSAY OF PARATHORMONE: CPT | Performed by: INTERNAL MEDICINE

## 2023-10-10 RX ORDER — INDOMETHACIN 50 MG/1
50 CAPSULE ORAL 3 TIMES DAILY
COMMUNITY
Start: 2023-10-03 | End: 2023-10-10

## 2023-10-10 RX ORDER — OLMESARTAN MEDOXOMIL 20 MG/1
20 TABLET ORAL DAILY
Qty: 90 TABLET | Refills: 3 | Status: SHIPPED | OUTPATIENT
Start: 2023-10-10 | End: 2024-02-01 | Stop reason: SDUPTHER

## 2023-10-10 NOTE — PROGRESS NOTES
Subjective     Patient ID: Asim Wilson is a 68 y.o. male.    Chief Complaint: ER Follow Up    Patient is a 69 yo male coming in today for a follow up. He was recently seen at [facility] for diverticulitis. He has been treated with Augmentin and Flagyl. He reports feeling much better.         Abdominal Pain  This is a new problem. The current episode started 1 to 4 weeks ago. The onset quality is undetermined. The problem occurs intermittently. The problem has been gradually improving. The pain is located in the left flank, right flank and generalized abdominal region. The pain is mild. The quality of the pain is aching. The abdominal pain does not radiate. Associated symptoms include hematochezia. Pertinent negatives include no constipation, fever, frequency, nausea or vomiting. Nothing aggravates the pain. Relieved by: antibiotics. The treatment provided moderate relief. Prior diagnostic workup includes CT scan. Patient's medical history includes kidney stones.     Patient Active Problem List   Diagnosis    Age-related nuclear cataract of right eye    Age-related nuclear cataract of left eye    Prediabetes    Gout    Hyperlipidemia    Hypothyroidism    Tinnitus of both ears    Hypertension    RBBB (right bundle branch block)    Abnormal nuclear stress test    CAD in native artery - mild non-obstructive by cath 12/2020    Dyslipidemia    Class 2 obesity in adult    Angina pectoris, unspecified    Hyperuricemia    Type 2 diabetes mellitus, without long-term current use of insulin    Type 2 DM with CKD stage 3 and hypertension       Past Medical History:   Diagnosis Date    Arthritis     Asthma     Cataract     Done OU    Diabetic retinopathy     Gout attack     Hypertension     GERONIMO (obstructive sleep apnea)     noncompliant CPAP    Thyroid disease        Review of Systems   Constitutional:  Negative for activity change, diaphoresis, fatigue and fever.   Respiratory:  Negative for chest tightness and shortness of  breath.    Cardiovascular:  Negative for chest pain.   Gastrointestinal:  Positive for abdominal pain and hematochezia. Negative for constipation, nausea and vomiting.   Genitourinary:  Negative for frequency.          Objective     Physical Exam  Vitals reviewed.   Constitutional:       General: He is not in acute distress.     Appearance: Normal appearance. He is not ill-appearing, toxic-appearing or diaphoretic.   Cardiovascular:      Rate and Rhythm: Normal rate and regular rhythm.      Pulses: Normal pulses.      Heart sounds: Normal heart sounds. No murmur heard.     No friction rub. No gallop.   Pulmonary:      Effort: Pulmonary effort is normal. No respiratory distress.      Breath sounds: Normal breath sounds. No stridor. No wheezing, rhonchi or rales.   Chest:      Chest wall: No tenderness.   Abdominal:      General: There is distension.      Palpations: There is no mass.      Tenderness: There is no abdominal tenderness. There is no right CVA tenderness, left CVA tenderness, guarding or rebound.      Hernia: No hernia is present.   Neurological:      Mental Status: He is alert.       Lab Results   Component Value Date    WBC 16.68 (H) 10/01/2023    HGB 12.9 (L) 10/01/2023    HCT 38.3 (L) 10/01/2023    MCV 97 10/01/2023     10/01/2023     CMP  Sodium   Date Value Ref Range Status   10/01/2023 138 136 - 145 mmol/L Final     Potassium   Date Value Ref Range Status   10/01/2023 4.0 3.5 - 5.1 mmol/L Final     Comment:     Anion Gap reference range revised on 4/28/2023     Chloride   Date Value Ref Range Status   10/01/2023 106 95 - 110 mmol/L Final     CO2   Date Value Ref Range Status   10/01/2023 20 (L) 22 - 31 mmol/L Final     Glucose   Date Value Ref Range Status   10/01/2023 110 70 - 110 mg/dL Final     Comment:     The ADA recommends the following guidelines for fasting glucose:    Normal:       less than 100 mg/dL    Prediabetes:  100 mg/dL to 125 mg/dL    Diabetes:     126 mg/dL or higher        BUN   Date Value Ref Range Status   10/01/2023 29 (H) 9 - 21 mg/dL Final     Creatinine   Date Value Ref Range Status   10/01/2023 1.78 (H) 0.50 - 1.40 mg/dL Final     Calcium   Date Value Ref Range Status   10/01/2023 9.2 8.4 - 10.2 mg/dL Final     Total Protein   Date Value Ref Range Status   10/01/2023 8.1 6.0 - 8.4 g/dL Final     Albumin   Date Value Ref Range Status   10/01/2023 4.1 3.5 - 5.2 g/dL Final   10/14/2020 3.9 3.6 - 5.1 g/dL Final     Comment:     For additional information, please refer to   http://education.[employer]/faq/CZE645 (This link is   being provided for informational/ educational purposes only.)  This test was developed and its analytical performance   characteristics have been determined by [facility]. It has not been cleared or approved by the   [place] Food and Drug Administration. This assay has been validated   pursuant to the CLIA regulations and is used for clinical   purposes.  @ Test Performed By:  [facility]  Angelo Moreau M.D.,   [address] [place] [id number]  CLIA  [id number]       Total Bilirubin   Date Value Ref Range Status   10/01/2023 0.7 0.2 - 1.3 mg/dL Final     Alkaline Phosphatase   Date Value Ref Range Status   10/01/2023 64 38 - 145 U/L Final     AST   Date Value Ref Range Status   10/01/2023 27 17 - 59 U/L Final     ALT   Date Value Ref Range Status   10/01/2023 29 0 - 50 U/L Final     Anion Gap   Date Value Ref Range Status   10/01/2023 12 5 - 12 mmol/L Final     Comment:     Anion Gap reference range revised on 4/28/2023     eGFR if    Date Value Ref Range Status   06/23/2022 59.3 (A) >60 mL/min/1.73 m^2 Final     eGFR if non    Date Value Ref Range Status   06/23/2022 51.3 (A) >60 mL/min/1.73 m^2 Final     Comment:     Calculation used to obtain the estimated glomerular filtration  rate (eGFR) is the CKD-EPI equation.        Lab  Results   Component Value Date    CHOL 100 (L) 09/11/2023     Lab Results   Component Value Date    HDL 28 (L) 09/11/2023     Lab Results   Component Value Date    LDLCALC 49.0 (L) 09/11/2023     Lab Results   Component Value Date    TRIG 115 09/11/2023     Lab Results   Component Value Date    CHOLHDL 28.0 09/11/2023     Lab Results   Component Value Date    HGBA1C 5.6 09/11/2023      CT Renal Stone Study ABD Pelvis WO  Order: [id number]  Status: Final result     Visible to patient: Yes (seen)     Next appt: 11/06/2023 at 11:30 AM in Pain Medicine (Brandon Llanos PA-C)     0 Result Notes  Details    Reading Physician Reading Date Result Priority   Davin Belcher MD  [phone] 10/1/2023 STAT     Narrative & Impression  EXAMINATION:  CT RENAL STONE STUDY ABD PELVIS WO     CLINICAL HISTORY:  Flank pain, kidney stone suspected; .     TECHNIQUE:  Axial CT slice through the abdomen and pelvis were obtained without the administration of intravenous contrast. Total DLP for the study is approximately 1149 mGy-cm. Automated exposure control was utilized.     COMPARISON:  None     FINDINGS:  The visualized lung bases demonstrate mild dependent atelectasis.  Calcified granuloma in the right lower lobe is noted.  The assessment of the intra-abdominal organs and bowel loops is limited in the absence of contrast.     The liver demonstrates a tiny punctate calcification in the left hepatic lobe which is likely related to prior granulomatous disease.  The spleen demonstrates small calcification, likely related to prior granulomatous disease.  The noncontrast appearance of the liver, spleen, pancreas, gallbladder, and adrenal glands otherwise appears to be within normal limits.  No evidence of nephrolithiasis, hydronephrosis, or hydroureter is visualized.  Bilateral perinephric fat stranding is seen which is nonspecific, possibly related to medical-renal disease.  The urinary bladder is decompressed.  There is bladder wall  thickening which may be related to decompression, but bladder pathology including an element of nonspecific cystitis cannot be excluded.  Please correlate with patient's clinical and laboratory findings with further evaluation including cystoscopy as warranted.     Portions of the small and large bowel are underdistended which along with lack of oral contrast limits assessment.  No dilated loops of bowel suggestive of high-grade obstruction are seen.  The appendix appears to be normal in caliber (for example series 5, images 283-357).  There are areas of colonic wall thickening throughout the colon which are likely related to underdistention as there is no significant pericolonic fat stranding about the majority of the colon.  However, there is evidence of subtle stranding about diverticula along the posterior margin of the proximal sigmoid colon (for example series 5, image 338).  This is concerning for focal colitis, likely related to acute diverticulitis.  Recommend follow-up to confirm resolution.  No free air or free fluid is visualized.  Abdominal-pelvic atherosclerosis is seen.  The stomach is underdistended.  There appear to be enlarged right inguinal and pelvic lymph nodes.  For example, right inguinal lymph node measuring approximately 17 mm in short axis dimension is seen on image 417 of series 5.  For example, right external iliac chain lymph node measuring 17 mm in short axis is seen on image 358 of series 5..  For example, right external iliac chain lymph node measuring 15 mm in short axis on image 402 of series 5 is seen.  The visualized osseous structures appear demineralized.  Degenerative changes affect the visualized spine.  Is visualized.     Impression:     1. No evidence of nephrolithiasis, hydronephrosis, or hydroureter is visualized.  There is grossly symmetric bilateral perinephric stranding which is nonspecific, possibly related to medical-renal disease.  Infectious or inflammatory process  cannot be excluded.  Please correlate with patient's clinical and laboratory findings.  2. The urinary bladder is underdistended.  There is bladder wall thickening which could be related to underdistention, but bladder pathology including an element of nonspecific cystitis cannot be excluded.  Please correlate with patient's clinical and laboratory findings with further evaluation including cystoscopy as warranted.  3. There are nonspecific enlarged right inguinal and right external iliac chain lymph nodes.  These lymph nodes are indeterminate, possibly reactive or metastatic.  4. There are areas of colonic wall thickening throughout the colon which are likely related to underdistention as there is no significant pericolonic fat stranding about the majority of the colon.  However, there is evidence of focal fat stranding about diverticula along the posterior margin of the proximal sigmoid colon which raises suspicion for focal colitis, likely related to acute diverticulitis.  Follow-up to confirm resolution is recommended.  5. Additional findings and details as above.        Electronically signed by: Davin Belcher MD  Date:                                            10/01/2023        Assessment and Plan     1. Diverticulitis  -     Ambulatory referral/consult to Gastroenterology; Future; Expected date: 10/17/2023    2. Type 2 DM with CKD stage 3 and hypertension  -     Hemoglobin A1C; Future; Expected date: 12/01/2023    Other orders  -     olmesartan (BENICAR) 20 MG tablet; Take 1 tablet (20 mg total) by mouth once daily.  Dispense: 90 tablet; Refill: 3  Stop indomethacin and lisinopril HCT      I spent 30 minutes on this encounter, time includes face-to-face, chart review, documentation, test review and orders.

## 2023-10-11 LAB
ALBUMIN SERPL BCP-MCNC: 3.6 G/DL (ref 3.5–5.2)
ANION GAP SERPL CALC-SCNC: 9 MMOL/L (ref 8–16)
BUN SERPL-MCNC: 32 MG/DL (ref 8–23)
CALCIUM SERPL-MCNC: 9 MG/DL (ref 8.7–10.5)
CHLORIDE SERPL-SCNC: 105 MMOL/L (ref 95–110)
CO2 SERPL-SCNC: 21 MMOL/L (ref 23–29)
CREAT SERPL-MCNC: 1.6 MG/DL (ref 0.5–1.4)
EST. GFR  (NO RACE VARIABLE): 46.1 ML/MIN/1.73 M^2
GLUCOSE SERPL-MCNC: 105 MG/DL (ref 70–110)
PHOSPHATE SERPL-MCNC: 3.5 MG/DL (ref 2.7–4.5)
POTASSIUM SERPL-SCNC: 4.1 MMOL/L (ref 3.5–5.1)
PTH-INTACT SERPL-MCNC: 144.3 PG/ML (ref 9–77)
SODIUM SERPL-SCNC: 135 MMOL/L (ref 136–145)
URATE SERPL-MCNC: 11.2 MG/DL (ref 3.4–7)

## 2023-10-31 ENCOUNTER — OFFICE VISIT (OUTPATIENT)
Dept: NEUROSURGERY | Facility: CLINIC | Age: 68
End: 2023-10-31
Payer: MEDICARE

## 2023-10-31 VITALS
RESPIRATION RATE: 18 BRPM | HEART RATE: 68 BPM | HEIGHT: 71 IN | BODY MASS INDEX: 34.86 KG/M2 | WEIGHT: 249 LBS | SYSTOLIC BLOOD PRESSURE: 127 MMHG | DIASTOLIC BLOOD PRESSURE: 73 MMHG

## 2023-10-31 DIAGNOSIS — M54.9 BACK PAIN, UNSPECIFIED BACK LOCATION, UNSPECIFIED BACK PAIN LATERALITY, UNSPECIFIED CHRONICITY: ICD-10-CM

## 2023-10-31 DIAGNOSIS — M43.16 SPONDYLOLISTHESIS OF LUMBAR REGION: Primary | ICD-10-CM

## 2023-10-31 PROCEDURE — 3288F FALL RISK ASSESSMENT DOCD: CPT | Mod: CPTII,S$GLB,, | Performed by: NURSE PRACTITIONER

## 2023-10-31 PROCEDURE — 3288F PR FALLS RISK ASSESSMENT DOCUMENTED: ICD-10-PCS | Mod: CPTII,S$GLB,, | Performed by: NURSE PRACTITIONER

## 2023-10-31 PROCEDURE — 3066F NEPHROPATHY DOC TX: CPT | Mod: CPTII,S$GLB,, | Performed by: NURSE PRACTITIONER

## 2023-10-31 PROCEDURE — 1125F AMNT PAIN NOTED PAIN PRSNT: CPT | Mod: CPTII,S$GLB,, | Performed by: NURSE PRACTITIONER

## 2023-10-31 PROCEDURE — 3061F NEG MICROALBUMINURIA REV: CPT | Mod: CPTII,S$GLB,, | Performed by: NURSE PRACTITIONER

## 2023-10-31 PROCEDURE — 99204 PR OFFICE/OUTPT VISIT, NEW, LEVL IV, 45-59 MIN: ICD-10-PCS | Mod: S$GLB,,, | Performed by: NURSE PRACTITIONER

## 2023-10-31 PROCEDURE — 1101F PT FALLS ASSESS-DOCD LE1/YR: CPT | Mod: CPTII,S$GLB,, | Performed by: NURSE PRACTITIONER

## 2023-10-31 PROCEDURE — 4010F PR ACE/ARB THEARPY RXD/TAKEN: ICD-10-PCS | Mod: CPTII,S$GLB,, | Performed by: NURSE PRACTITIONER

## 2023-10-31 PROCEDURE — 1159F PR MEDICATION LIST DOCUMENTED IN MEDICAL RECORD: ICD-10-PCS | Mod: CPTII,S$GLB,, | Performed by: NURSE PRACTITIONER

## 2023-10-31 PROCEDURE — 3044F HG A1C LEVEL LT 7.0%: CPT | Mod: CPTII,S$GLB,, | Performed by: NURSE PRACTITIONER

## 2023-10-31 PROCEDURE — 3074F SYST BP LT 130 MM HG: CPT | Mod: CPTII,S$GLB,, | Performed by: NURSE PRACTITIONER

## 2023-10-31 PROCEDURE — 1125F PR PAIN SEVERITY QUANTIFIED, PAIN PRESENT: ICD-10-PCS | Mod: CPTII,S$GLB,, | Performed by: NURSE PRACTITIONER

## 2023-10-31 PROCEDURE — 3044F PR MOST RECENT HEMOGLOBIN A1C LEVEL <7.0%: ICD-10-PCS | Mod: CPTII,S$GLB,, | Performed by: NURSE PRACTITIONER

## 2023-10-31 PROCEDURE — 3008F PR BODY MASS INDEX (BMI) DOCUMENTED: ICD-10-PCS | Mod: CPTII,S$GLB,, | Performed by: NURSE PRACTITIONER

## 2023-10-31 PROCEDURE — 3061F PR NEG MICROALBUMINURIA RESULT DOCUMENTED/REVIEW: ICD-10-PCS | Mod: CPTII,S$GLB,, | Performed by: NURSE PRACTITIONER

## 2023-10-31 PROCEDURE — 4010F ACE/ARB THERAPY RXD/TAKEN: CPT | Mod: CPTII,S$GLB,, | Performed by: NURSE PRACTITIONER

## 2023-10-31 PROCEDURE — 3008F BODY MASS INDEX DOCD: CPT | Mod: CPTII,S$GLB,, | Performed by: NURSE PRACTITIONER

## 2023-10-31 PROCEDURE — 3066F PR DOCUMENTATION OF TREATMENT FOR NEPHROPATHY: ICD-10-PCS | Mod: CPTII,S$GLB,, | Performed by: NURSE PRACTITIONER

## 2023-10-31 PROCEDURE — 99999 PR PBB SHADOW E&M-EST. PATIENT-LVL III: ICD-10-PCS | Mod: PBBFAC,,, | Performed by: NURSE PRACTITIONER

## 2023-10-31 PROCEDURE — 99204 OFFICE O/P NEW MOD 45 MIN: CPT | Mod: S$GLB,,, | Performed by: NURSE PRACTITIONER

## 2023-10-31 PROCEDURE — 3078F PR MOST RECENT DIASTOLIC BLOOD PRESSURE < 80 MM HG: ICD-10-PCS | Mod: CPTII,S$GLB,, | Performed by: NURSE PRACTITIONER

## 2023-10-31 PROCEDURE — 3078F DIAST BP <80 MM HG: CPT | Mod: CPTII,S$GLB,, | Performed by: NURSE PRACTITIONER

## 2023-10-31 PROCEDURE — 1101F PR PT FALLS ASSESS DOC 0-1 FALLS W/OUT INJ PAST YR: ICD-10-PCS | Mod: CPTII,S$GLB,, | Performed by: NURSE PRACTITIONER

## 2023-10-31 PROCEDURE — 1159F MED LIST DOCD IN RCRD: CPT | Mod: CPTII,S$GLB,, | Performed by: NURSE PRACTITIONER

## 2023-10-31 PROCEDURE — 99999 PR PBB SHADOW E&M-EST. PATIENT-LVL III: CPT | Mod: PBBFAC,,, | Performed by: NURSE PRACTITIONER

## 2023-10-31 PROCEDURE — 3074F PR MOST RECENT SYSTOLIC BLOOD PRESSURE < 130 MM HG: ICD-10-PCS | Mod: CPTII,S$GLB,, | Performed by: NURSE PRACTITIONER
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
## 2023-11-08 ENCOUNTER — OFFICE VISIT (OUTPATIENT)
Dept: NEPHROLOGY | Facility: CLINIC | Age: 68
End: 2023-11-08
Payer: MEDICARE

## 2023-11-08 ENCOUNTER — OFFICE VISIT (OUTPATIENT)
Dept: GASTROENTEROLOGY | Facility: CLINIC | Age: 68
End: 2023-11-08
Payer: MEDICARE

## 2023-11-08 ENCOUNTER — LAB VISIT (OUTPATIENT)
Dept: LAB | Facility: HOSPITAL | Age: 68
End: 2023-11-08
Attending: INTERNAL MEDICINE
Payer: MEDICARE

## 2023-11-08 VITALS — HEIGHT: 71 IN | BODY MASS INDEX: 36.15 KG/M2 | WEIGHT: 258.19 LBS

## 2023-11-08 VITALS — DIASTOLIC BLOOD PRESSURE: 68 MMHG | HEART RATE: 61 BPM | SYSTOLIC BLOOD PRESSURE: 118 MMHG | OXYGEN SATURATION: 95 %

## 2023-11-08 DIAGNOSIS — N18.31 CHRONIC KIDNEY DISEASE, STAGE 3A: ICD-10-CM

## 2023-11-08 DIAGNOSIS — E66.01 SEVERE OBESITY: ICD-10-CM

## 2023-11-08 DIAGNOSIS — R93.3 ABNORMAL CT SCAN, GASTROINTESTINAL TRACT: Primary | ICD-10-CM

## 2023-11-08 DIAGNOSIS — K52.9 COLITIS: ICD-10-CM

## 2023-11-08 DIAGNOSIS — I10 PRIMARY HYPERTENSION: ICD-10-CM

## 2023-11-08 DIAGNOSIS — Z86.19 HISTORY OF CLOSTRIDIOIDES DIFFICILE COLITIS: ICD-10-CM

## 2023-11-08 DIAGNOSIS — N17.9 AKI (ACUTE KIDNEY INJURY): ICD-10-CM

## 2023-11-08 DIAGNOSIS — R10.9 BILATERAL FLANK PAIN: ICD-10-CM

## 2023-11-08 DIAGNOSIS — N17.9 AKI (ACUTE KIDNEY INJURY): Primary | ICD-10-CM

## 2023-11-08 LAB
ALBUMIN SERPL BCP-MCNC: 3.7 G/DL (ref 3.5–5.2)
ANION GAP SERPL CALC-SCNC: 10 MMOL/L (ref 8–16)
BUN SERPL-MCNC: 18 MG/DL (ref 8–23)
CALCIUM SERPL-MCNC: 9.6 MG/DL (ref 8.7–10.5)
CHLORIDE SERPL-SCNC: 107 MMOL/L (ref 95–110)
CO2 SERPL-SCNC: 23 MMOL/L (ref 23–29)
CREAT SERPL-MCNC: 1.3 MG/DL (ref 0.5–1.4)
EST. GFR  (NO RACE VARIABLE): 59.1 ML/MIN/1.73 M^2
GLUCOSE SERPL-MCNC: 93 MG/DL (ref 70–110)
PHOSPHATE SERPL-MCNC: 3.5 MG/DL (ref 2.7–4.5)
POTASSIUM SERPL-SCNC: 4.2 MMOL/L (ref 3.5–5.1)
SODIUM SERPL-SCNC: 140 MMOL/L (ref 136–145)

## 2023-11-08 PROCEDURE — 99999 PR PBB SHADOW E&M-EST. PATIENT-LVL III: ICD-10-PCS | Mod: PBBFAC,,, | Performed by: INTERNAL MEDICINE

## 2023-11-08 PROCEDURE — 3061F PR NEG MICROALBUMINURIA RESULT DOCUMENTED/REVIEW: ICD-10-PCS | Mod: CPTII,S$GLB,, | Performed by: INTERNAL MEDICINE

## 2023-11-08 PROCEDURE — 3061F NEG MICROALBUMINURIA REV: CPT | Mod: CPTII,S$GLB,, | Performed by: INTERNAL MEDICINE

## 2023-11-08 PROCEDURE — 1101F PR PT FALLS ASSESS DOC 0-1 FALLS W/OUT INJ PAST YR: ICD-10-PCS | Mod: CPTII,S$GLB,, | Performed by: INTERNAL MEDICINE

## 2023-11-08 PROCEDURE — 80069 RENAL FUNCTION PANEL: CPT | Performed by: INTERNAL MEDICINE

## 2023-11-08 PROCEDURE — 99215 PR OFFICE/OUTPT VISIT, EST, LEVL V, 40-54 MIN: ICD-10-PCS | Mod: S$GLB,,, | Performed by: NURSE PRACTITIONER

## 2023-11-08 PROCEDURE — 3288F PR FALLS RISK ASSESSMENT DOCUMENTED: ICD-10-PCS | Mod: CPTII,S$GLB,, | Performed by: INTERNAL MEDICINE

## 2023-11-08 PROCEDURE — 3044F PR MOST RECENT HEMOGLOBIN A1C LEVEL <7.0%: ICD-10-PCS | Mod: CPTII,S$GLB,, | Performed by: INTERNAL MEDICINE

## 2023-11-08 PROCEDURE — 3044F HG A1C LEVEL LT 7.0%: CPT | Mod: CPTII,S$GLB,, | Performed by: NURSE PRACTITIONER

## 2023-11-08 PROCEDURE — 1101F PT FALLS ASSESS-DOCD LE1/YR: CPT | Mod: CPTII,S$GLB,, | Performed by: INTERNAL MEDICINE

## 2023-11-08 PROCEDURE — 3008F BODY MASS INDEX DOCD: CPT | Mod: CPTII,S$GLB,, | Performed by: NURSE PRACTITIONER

## 2023-11-08 PROCEDURE — 3066F NEPHROPATHY DOC TX: CPT | Mod: CPTII,S$GLB,, | Performed by: INTERNAL MEDICINE

## 2023-11-08 PROCEDURE — 3008F PR BODY MASS INDEX (BMI) DOCUMENTED: ICD-10-PCS | Mod: CPTII,S$GLB,, | Performed by: NURSE PRACTITIONER

## 2023-11-08 PROCEDURE — 3288F FALL RISK ASSESSMENT DOCD: CPT | Mod: CPTII,S$GLB,, | Performed by: NURSE PRACTITIONER

## 2023-11-08 PROCEDURE — 1160F PR REVIEW ALL MEDS BY PRESCRIBER/CLIN PHARMACIST DOCUMENTED: ICD-10-PCS | Mod: CPTII,S$GLB,, | Performed by: INTERNAL MEDICINE

## 2023-11-08 PROCEDURE — 1126F PR PAIN SEVERITY QUANTIFIED, NO PAIN PRESENT: ICD-10-PCS | Mod: CPTII,S$GLB,, | Performed by: NURSE PRACTITIONER

## 2023-11-08 PROCEDURE — 99999 PR PBB SHADOW E&M-EST. PATIENT-LVL III: CPT | Mod: PBBFAC,,, | Performed by: NURSE PRACTITIONER

## 2023-11-08 PROCEDURE — 3288F PR FALLS RISK ASSESSMENT DOCUMENTED: ICD-10-PCS | Mod: CPTII,S$GLB,, | Performed by: NURSE PRACTITIONER

## 2023-11-08 PROCEDURE — 36415 COLL VENOUS BLD VENIPUNCTURE: CPT | Mod: PO | Performed by: INTERNAL MEDICINE

## 2023-11-08 PROCEDURE — 4010F ACE/ARB THERAPY RXD/TAKEN: CPT | Mod: CPTII,S$GLB,, | Performed by: NURSE PRACTITIONER

## 2023-11-08 PROCEDURE — 99214 PR OFFICE/OUTPT VISIT, EST, LEVL IV, 30-39 MIN: ICD-10-PCS | Mod: S$GLB,,, | Performed by: INTERNAL MEDICINE

## 2023-11-08 PROCEDURE — 3074F PR MOST RECENT SYSTOLIC BLOOD PRESSURE < 130 MM HG: ICD-10-PCS | Mod: CPTII,S$GLB,, | Performed by: INTERNAL MEDICINE

## 2023-11-08 PROCEDURE — 3078F DIAST BP <80 MM HG: CPT | Mod: CPTII,S$GLB,, | Performed by: INTERNAL MEDICINE

## 2023-11-08 PROCEDURE — 3061F PR NEG MICROALBUMINURIA RESULT DOCUMENTED/REVIEW: ICD-10-PCS | Mod: CPTII,S$GLB,, | Performed by: NURSE PRACTITIONER

## 2023-11-08 PROCEDURE — 99215 OFFICE O/P EST HI 40 MIN: CPT | Mod: S$GLB,,, | Performed by: NURSE PRACTITIONER

## 2023-11-08 PROCEDURE — 99214 OFFICE O/P EST MOD 30 MIN: CPT | Mod: S$GLB,,, | Performed by: INTERNAL MEDICINE

## 2023-11-08 PROCEDURE — 3066F PR DOCUMENTATION OF TREATMENT FOR NEPHROPATHY: ICD-10-PCS | Mod: CPTII,S$GLB,, | Performed by: INTERNAL MEDICINE

## 2023-11-08 PROCEDURE — 3288F FALL RISK ASSESSMENT DOCD: CPT | Mod: CPTII,S$GLB,, | Performed by: INTERNAL MEDICINE

## 2023-11-08 PROCEDURE — 3074F SYST BP LT 130 MM HG: CPT | Mod: CPTII,S$GLB,, | Performed by: INTERNAL MEDICINE

## 2023-11-08 PROCEDURE — 1126F AMNT PAIN NOTED NONE PRSNT: CPT | Mod: CPTII,S$GLB,, | Performed by: NURSE PRACTITIONER

## 2023-11-08 PROCEDURE — 3078F PR MOST RECENT DIASTOLIC BLOOD PRESSURE < 80 MM HG: ICD-10-PCS | Mod: CPTII,S$GLB,, | Performed by: INTERNAL MEDICINE

## 2023-11-08 PROCEDURE — 1159F PR MEDICATION LIST DOCUMENTED IN MEDICAL RECORD: ICD-10-PCS | Mod: CPTII,S$GLB,, | Performed by: INTERNAL MEDICINE

## 2023-11-08 PROCEDURE — 99999 PR PBB SHADOW E&M-EST. PATIENT-LVL III: CPT | Mod: PBBFAC,,, | Performed by: INTERNAL MEDICINE

## 2023-11-08 PROCEDURE — 3044F PR MOST RECENT HEMOGLOBIN A1C LEVEL <7.0%: ICD-10-PCS | Mod: CPTII,S$GLB,, | Performed by: NURSE PRACTITIONER

## 2023-11-08 PROCEDURE — 1101F PR PT FALLS ASSESS DOC 0-1 FALLS W/OUT INJ PAST YR: ICD-10-PCS | Mod: CPTII,S$GLB,, | Performed by: NURSE PRACTITIONER

## 2023-11-08 PROCEDURE — 3066F NEPHROPATHY DOC TX: CPT | Mod: CPTII,S$GLB,, | Performed by: NURSE PRACTITIONER

## 2023-11-08 PROCEDURE — 3061F NEG MICROALBUMINURIA REV: CPT | Mod: CPTII,S$GLB,, | Performed by: NURSE PRACTITIONER

## 2023-11-08 PROCEDURE — 4010F PR ACE/ARB THEARPY RXD/TAKEN: ICD-10-PCS | Mod: CPTII,S$GLB,, | Performed by: NURSE PRACTITIONER

## 2023-11-08 PROCEDURE — 99999 PR PBB SHADOW E&M-EST. PATIENT-LVL III: ICD-10-PCS | Mod: PBBFAC,,, | Performed by: NURSE PRACTITIONER

## 2023-11-08 PROCEDURE — 4010F PR ACE/ARB THEARPY RXD/TAKEN: ICD-10-PCS | Mod: CPTII,S$GLB,, | Performed by: INTERNAL MEDICINE

## 2023-11-08 PROCEDURE — 1160F RVW MEDS BY RX/DR IN RCRD: CPT | Mod: CPTII,S$GLB,, | Performed by: INTERNAL MEDICINE

## 2023-11-08 PROCEDURE — 3066F PR DOCUMENTATION OF TREATMENT FOR NEPHROPATHY: ICD-10-PCS | Mod: CPTII,S$GLB,, | Performed by: NURSE PRACTITIONER

## 2023-11-08 PROCEDURE — 1101F PT FALLS ASSESS-DOCD LE1/YR: CPT | Mod: CPTII,S$GLB,, | Performed by: NURSE PRACTITIONER

## 2023-11-08 PROCEDURE — 3044F HG A1C LEVEL LT 7.0%: CPT | Mod: CPTII,S$GLB,, | Performed by: INTERNAL MEDICINE

## 2023-11-08 PROCEDURE — 4010F ACE/ARB THERAPY RXD/TAKEN: CPT | Mod: CPTII,S$GLB,, | Performed by: INTERNAL MEDICINE

## 2023-11-08 PROCEDURE — 1159F MED LIST DOCD IN RCRD: CPT | Mod: CPTII,S$GLB,, | Performed by: INTERNAL MEDICINE

## 2023-11-08 NOTE — Clinical Note
Alea Jarrell, I saw one of your established patients today. Just wanted to forward the progress note for your review and to see if you have any further recommendations (would you want to repeat a colonoscopy or just recheck CT for resolution of CT findings?) Thanks, Lorrie
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
## 2023-11-08 NOTE — Clinical Note
Hello.  I would change him to uloric 40 mg and stop allopurinol.  If 40 mg does not get him to goal then you could go to 80 mg.  I defer this decision to you.  I know we are trying to avoid the NSAIDs.

## 2023-11-08 NOTE — PATIENT INSTRUCTIONS
Diverticulitis    Some people get pouches along the wall of the colon as they get older. The pouches, called diverticuli, usually cause no symptoms. If the pouches become blocked, you can get an infection. This infection is called diverticulitis. It causes pain in your lower abdomen and fever. If not treated, it can become a serious condition, causing an abscess to form inside the pouch. The abscess may block the intestinal tract even or rupture, spreading infection throughout the abdomen.  When treatment is started early, oral antibiotics alone may be enough to cure diverticulitis. This method is tried first. But, if you don't improve or if your condition gets worse while using oral antibiotics, you may need to be admitted to the hospital for IV antibiotics. Severe cases may require surgery.  Home care  The following guidelines will help you care for yourself at home:  During the acute illness, rest and follow your healthcare provider's instructions about diet. Sometimes you will need to follow a clear liquid diet to rest your bowel. Once your symptoms are better, you may be told to follow a low-fiber diet for some time. Include foods like:  Flake cereal, mashed potatoes, pancakes, waffles, pasta, white bread, rice, applesauce, bananas, eggs, fish, poultry, tofu, and cooked soft vegetables  Take antibiotics exactly as instructed. Don't miss any doses or stop taking the medication, even if you feel better.  Monitor your temperature and tell your healthcare provider if you have rising temperatures.  Preventing future attacks  Once you have an episode of diverticulitis, you are at risk for having it again. After you have recovered from this episode, you may be able to lower your risk by eating a high-fiber diet (20 gm/day to 35 gm/day of fiber). This cleans out the colon pouches that already exist and may prevent new ones from forming. Foods high in fiber include fresh fruits and edible peelings, raw or lightly cooked  vegetables, whole grain cereals and breads, dried beans and peas, and bran.  Other steps that can help prevent future attacks include:  Take your medicines, such as antibiotics, as your healthcare provider says.  Drink 6 to 8 glasses of water every day, unless told otherwise.  Use a heating pad or hot water bottle to help abdominal cramping or pain.  Begin an exercise program. Ask your healthcare provider how to get started. You can benefit from simple activities such as walking or gardening.  Treat diarrhea with a bland diet. Start with liquids only; then slowly add fiber over time.  Watch for changes in your bowel movements (constipation to diarrhea). Avoid constipation by eating a high fiber diet and taking a stool softener if needed.  Get plenty of rest and sleep.  Follow-up care  Follow up with your healthcare provider as advised or sooner if you are not getting better in the next 2 days.  When to seek medical advice  Call your healthcare provider right away if any of these occur:  Fever of 100.4°F (38°C) or higher, or as directed by your healthcare provider  Repeated vomiting or swelling of the abdomen  Weakness, dizziness, light-headedness  Pain in your abdomen that gets worse, severe, or spreads to your back  Pain that moves to the right lower abdomen  Rectal bleeding (stools that are red, black or maroon color)  Unexpected vaginal bleeding  Date Last Reviewed: 9/1/2016  © 2639-8589 [employer]. [address] [place] [address], [place] [address]. All rights reserved. This information is not intended as a substitute for professional medical care. Always follow your healthcare professional's instructions.

## 2023-11-08 NOTE — PROGRESS NOTES
Subjective:       Patient ID: Asim Wilson is a 68 y.o. male Body mass index is 36.01 kg/m².    Chief Complaint: Diverticulitis    This patient is new to me.  Referring Provider: Naren YOUNG for diverticulitis.  Established patient of Dr. Jarrell.     Patient is here with his wife, whom assisted with history.  Reports started with right hip pain at least since ~5/2023, had ultrasound imaging and showed enlarged lymph nodes; treated with clindamycin, it caused kidney failure. Reports in July 2023 while on a biking trip headed [place] he had to stop the trip in [place] due to abdominal pain, diarrhea, and not urinating; was hospitalized for it. Reports kidney function improved but still having bilateral flank pain and see Dr. Rojo, nephrology. Saw ortho and told he didn't have enlarged lymph node and the pain was from a hip problem. History of back pain. Reports took indocin recently for gout flare-up and it relieved the flank & back pain.   Reports had a CT scan a week before going to [facility].    Reviewed chart and noted in care everywhere hospital admission for diagnoses including but not limited to: ZAKI and C. Diff colitis. Admission Date: 7/1/2023 Discharge Date: 7/4/2023. C diff treated with vancomycin.    GI Problem  The primary symptoms include weight loss (varies; has gained some recently) and abdominal pain (has resolved). Primary symptoms do not include fever, fatigue, nausea, vomiting, diarrhea, melena, hematemesis, hematochezia or dysuria.   The abdominal pain began more than 2 days ago (started ~9/28/2023, went to the ER for it, treated for it with augmentin & flagyl). The abdominal pain has been resolved since its onset. The abdominal pain is generalized. The severity of the abdominal pain is 0/10 (currently).   The illness does not include chills, dysphagia, odynophagia or constipation. Associated medical issues do not include inflammatory bowel disease or  GERD.     Review of Systems   Constitutional:  Positive for weight loss (varies; has gained some recently). Negative for appetite change, chills, fatigue and fever.   HENT:  Negative for sore throat and trouble swallowing.    Respiratory:  Negative for cough, choking and shortness of breath.    Cardiovascular:  Negative for chest pain.   Gastrointestinal:  Positive for abdominal pain (has resolved). Negative for anal bleeding, blood in stool, constipation, diarrhea, dysphagia, hematemesis, hematochezia, melena, nausea, rectal pain and vomiting.   Genitourinary:  Positive for flank pain. Negative for difficulty urinating and dysuria.   Neurological:  Negative for weakness.       Past Medical History:   Diagnosis Date    Arthritis     Asthma     C. difficile colitis 07/01/2023    in care everywhere    Cataract     Done OU    Colon polyp     Diabetic retinopathy     Gout attack     Hypertension     GERONIMO (obstructive sleep apnea)     noncompliant CPAP    Thyroid disease      Past Surgical History:   Procedure Laterality Date    CATARACT EXTRACTION W/  INTRAOCULAR LENS IMPLANT Right 06/13/2019    Dr Moses    CATARACT EXTRACTION W/  INTRAOCULAR LENS IMPLANT Left 07/18/2019    Dr Moses    COLONOSCOPY N/A 10/18/2022    Procedure: COLONOSCOPY;  Surgeon: Rahat Jarrell MD;  [facility]: [facility] ENDO;  Service: Endoscopy;  Laterality: N/A; Repeat colonoscopy in 3 years for surveillance    CORONARY ANGIOGRAPHY N/A 12/10/2020    Procedure: ANGIOGRAM, CORONARY ARTERY;  Surgeon: Roseanna Barr MD;  Location: [facility] CATH;  Service: Cardiology;  Laterality: N/A;    Cyst removed      From back of neck    cyst removed      from back    LEFT HEART CATHETERIZATION Left 12/10/2020    Procedure: Left heart cath;  Surgeon: Roseanna Barr MD;  Location: [facility] CATH;  Service: Cardiology;  Laterality: Left;    PHACOEMULSIFICATION OF CATARACT Right 06/13/2019    Procedure: PHACOEMULSIFICATION, CATARACT;  Surgeon: Gabe Moses Jr., MD;   Location: [facility] OR;  Service: Ophthalmology;  Laterality: Right;  Right    PHACOEMULSIFICATION OF CATARACT Left 07/18/2019    Procedure: PHACOEMULSIFICATION, CATARACT;  Surgeon: Gabe Moses Jr., MD;  Location: [facility] OR;  Service: Ophthalmology;  Laterality: Left;  Left     Family History   Problem Relation Age of Onset    Cancer Mother     Alzheimer's disease Father     No Known Problems Sister     No Known Problems Sister     No Known Problems Sister     No Known Problems Maternal Grandmother     No Known Problems Maternal Grandfather     No Known Problems Paternal Grandmother     No Known Problems Paternal Grandfather     Amblyopia Neg Hx     Blindness Neg Hx     Cataracts Neg Hx     Diabetes Neg Hx     Hypertension Neg Hx     Macular degeneration Neg Hx     Glaucoma Neg Hx     Retinal detachment Neg Hx     Strabismus Neg Hx     Stroke Neg Hx     Thyroid disease Neg Hx     Colon cancer Neg Hx     Crohn's disease Neg Hx     Esophageal cancer Neg Hx     Stomach cancer Neg Hx     Ulcerative colitis Neg Hx      Social History     Tobacco Use    Smoking status: Former    Smokeless tobacco: Never    Tobacco comments:     Quit 40 yrs ago   Substance Use Topics    Alcohol use: Not Currently     Comment: rarely    Drug use: Never     Wt Readings from Last 10 Encounters:   11/08/23 117.1 kg (258 lb 2.5 oz)   10/31/23 112.9 kg (249 lb)   10/10/23 113.1 kg (249 lb 5.4 oz)   09/28/23 115.9 kg (255 lb 10 oz)   09/11/23 118.8 kg (261 lb 14.5 oz)   07/11/23 123.3 kg (271 lb 13.2 oz)   05/16/23 123.3 kg (271 lb 13.2 oz)   01/10/23 124.3 kg (274 lb 0.5 oz)   01/03/23 122.3 kg (269 lb 10 oz)   06/27/22 121.4 kg (267 lb 10.2 oz)     Lab Results   Component Value Date    WBC 16.68 (H) 10/01/2023    HGB 12.9 (L) 10/01/2023    HCT 38.3 (L) 10/01/2023    MCV 97 10/01/2023     10/01/2023     CMP  Sodium   Date Value Ref Range Status   10/10/2023 135 (L) 136 - 145 mmol/L Final     Potassium   Date Value Ref Range Status    10/10/2023 4.1 3.5 - 5.1 mmol/L Final     Chloride   Date Value Ref Range Status   10/10/2023 105 95 - 110 mmol/L Final     CO2   Date Value Ref Range Status   10/10/2023 21 (L) 23 - 29 mmol/L Final     Glucose   Date Value Ref Range Status   10/10/2023 105 70 - 110 mg/dL Final     BUN   Date Value Ref Range Status   10/10/2023 32 (H) 8 - 23 mg/dL Final     Creatinine   Date Value Ref Range Status   10/10/2023 1.6 (H) 0.5 - 1.4 mg/dL Final     Calcium   Date Value Ref Range Status   10/10/2023 9.0 8.7 - 10.5 mg/dL Final     Total Protein   Date Value Ref Range Status   10/01/2023 8.1 6.0 - 8.4 g/dL Final     Albumin   Date Value Ref Range Status   10/10/2023 3.6 3.5 - 5.2 g/dL Final   10/14/2020 3.9 3.6 - 5.1 g/dL Final     Comment:     For additional information, please refer to   http://education.[employer]/faq/EMW344 (This link is   being provided for informational/ educational purposes only.)  This test was developed and its analytical performance   characteristics have been determined by [facility]. It has not been cleared or approved by the   [place] Food and Drug Administration. This assay has been validated   pursuant to the CLIA regulations and is used for clinical   purposes.  @ Test Performed By:  [facility]  Angelo Moreau M.D.,   [address] [place] [id number]  CLIA  [id number]       Total Bilirubin   Date Value Ref Range Status   10/01/2023 0.7 0.2 - 1.3 mg/dL Final     Alkaline Phosphatase   Date Value Ref Range Status   10/01/2023 64 38 - 145 U/L Final     AST   Date Value Ref Range Status   10/01/2023 27 17 - 59 U/L Final     ALT   Date Value Ref Range Status   10/01/2023 29 0 - 50 U/L Final     Anion Gap   Date Value Ref Range Status   10/10/2023 9 8 - 16 mmol/L Final     eGFR if    Date Value Ref Range Status   06/23/2022 59.3 (A) >60 mL/min/1.73 m^2 Final     eGFR if non African  American   Date Value Ref Range Status   06/23/2022 51.3 (A) >60 mL/min/1.73 m^2 Final     Comment:     Calculation used to obtain the estimated glomerular filtration  rate (eGFR) is the CKD-EPI equation.        Lab Results   Component Value Date    TSH 0.644 09/11/2023     Reviewed prior medical records including radiology report of 10/1/2023 CT renal stone abdomen pelvis and ER visit note; 5/23/2023 ultrasound soft tissue groin; 10/2/2023 visit note with MARIELOS YOUNG; & endoscopy history (see surgical history/procedures).    Objective:      Physical Exam  Vitals and nursing note reviewed.   Constitutional:       General: He is not in acute distress.     Appearance: Normal appearance. He is well-developed. He is not diaphoretic.   HENT:      Mouth/Throat:      Lips: Pink. No lesions.      Mouth: Mucous membranes are moist. No oral lesions.      Tongue: No lesions.      Pharynx: Oropharynx is clear. No pharyngeal swelling or posterior oropharyngeal erythema.   Eyes:      General: No scleral icterus.     Conjunctiva/sclera: Conjunctivae normal.   Pulmonary:      Effort: Pulmonary effort is normal. No respiratory distress.      Breath sounds: Normal breath sounds. No wheezing.   Abdominal:      General: Bowel sounds are normal. There is no distension or abdominal bruit.      Palpations: Abdomen is soft. Abdomen is not rigid. There is no mass.      Tenderness: There is no abdominal tenderness. There is no guarding or rebound. Negative signs include Garnica's sign and McBurney's sign.   Skin:     General: Skin is warm and dry.      Coloration: Skin is not jaundiced or pale.      Findings: No erythema or rash.   Neurological:      Mental Status: He is alert and oriented to person, place, and time.   Psychiatric:         Behavior: Behavior normal.         Thought Content: Thought content normal.         Judgment: Judgment normal.         Assessment:       1. Abnormal CT scan, gastrointestinal tract    2. Colitis    3.  History of Clostridioides difficile colitis    4. Bilateral flank pain        Plan:       Abnormal CT scan, gastrointestinal tract: Colitis  -     CBC Without Differential; Future; Expected date: 11/08/2023  -     CT Abdomen Pelvis With IV Contrast; Future; Expected date: 12/08/2023 (to check for resolution)  -     Creatinine, Serum; Future; Expected date: 11/08/2023  - if diarrhea recurs, recommend follow-up for continued evaluation and management to include but not limited to stool studies  - informed patient that I will update Dr. Jarrell on patient's status and see if he has any further recommendations. Discussed with patient about possible repeat lower endoscopy, pending results and Dr. Jarrell's recommendations; patient verbalized understanding    History of Clostridioides difficile colitis  - if diarrhea recurs, recommend follow-up for continued evaluation and management to include but not limited to stool studies    Bilateral flank pain  Recommend follow-up with Primary Care Provider, nephrology, & ortho for continued evaluation and management.    Follow up in about 1 month (around 12/8/2023), or if symptoms worsen or fail to improve.      If no improvement in symptoms or symptoms worsen, call/follow-up at clinic or go to ER.        43 minutes of total time spent on the encounter, which includes face to face time and non-face to face time preparing to see the patient (e.g., review of tests), Obtaining and/or reviewing separately obtained history, Documenting clinical information in the electronic or other health record, Independently interpreting results (not separately reported) and communicating results to the patient/family/caregiver, or Care coordination (not separately reported).

## 2023-11-09 ENCOUNTER — TELEPHONE (OUTPATIENT)
Dept: GASTROENTEROLOGY | Facility: CLINIC | Age: 68
End: 2023-11-09
Payer: MEDICARE

## 2023-11-09 NOTE — TELEPHONE ENCOUNTER
----- Message from Rahat Jarrell MD sent at 11/8/2023  3:32 PM CST -----  Agree with repeat CT scan for resolution.  ----- Message -----  From: Lorrie Alicea FNP  Sent: 11/8/2023  11:04 AM CST  To: Rahat Jarrell MD    Hey Dr. Jarrell,  I saw one of your established patients today. Just wanted to forward the progress note for your review and to see if you have any further recommendations (would you want to repeat a colonoscopy or just recheck CT for resolution of CT findings?)  Thanks,  Lorrie

## 2023-11-09 NOTE — TELEPHONE ENCOUNTER
Please inform patient that I updated Dr. Jarrell and he agreed with repeat CT scan as scheduled and no need for repeat colonoscopy at this time.  Thanks  ZARINA

## 2023-11-10 ENCOUNTER — HOSPITAL ENCOUNTER (OUTPATIENT)
Dept: RADIOLOGY | Facility: HOSPITAL | Age: 68
Discharge: HOME | End: 2023-11-10
Attending: NURSE PRACTITIONER
Payer: MEDICARE

## 2023-11-10 DIAGNOSIS — K52.9 COLITIS: ICD-10-CM

## 2023-11-10 DIAGNOSIS — M43.16 SPONDYLOLISTHESIS OF LUMBAR REGION: ICD-10-CM

## 2023-11-10 DIAGNOSIS — R93.3 ABNORMAL CT SCAN, GASTROINTESTINAL TRACT: ICD-10-CM

## 2023-11-10 PROCEDURE — 25500020 PHARM REV CODE 255: Mod: PO | Performed by: NURSE PRACTITIONER

## 2023-11-10 PROCEDURE — 74177 CT ABDOMEN PELVIS WITH IV CONTRAST: ICD-10-PCS | Mod: 26,,, | Performed by: RADIOLOGY

## 2023-11-10 PROCEDURE — A9698 NON-RAD CONTRAST MATERIALNOC: HCPCS | Mod: PO | Performed by: NURSE PRACTITIONER

## 2023-11-10 PROCEDURE — 74177 CT ABD & PELVIS W/CONTRAST: CPT | Mod: 26,,, | Performed by: RADIOLOGY

## 2023-11-10 PROCEDURE — 72148 MRI LUMBAR SPINE WITHOUT CONTRAST: ICD-10-PCS | Mod: 26,,, | Performed by: RADIOLOGY

## 2023-11-10 PROCEDURE — 72148 MRI LUMBAR SPINE W/O DYE: CPT | Mod: TC,PO

## 2023-11-10 PROCEDURE — 72148 MRI LUMBAR SPINE W/O DYE: CPT | Mod: 26,,, | Performed by: RADIOLOGY

## 2023-11-10 PROCEDURE — 74177 CT ABD & PELVIS W/CONTRAST: CPT | Mod: TC,PO

## 2023-11-10 RX ADMIN — IOHEXOL 100 ML: 350 INJECTION, SOLUTION INTRAVENOUS at 10:11

## 2023-11-10 RX ADMIN — IOHEXOL 1000 ML: 12 SOLUTION ORAL at 10:11

## 2023-11-13 ENCOUNTER — TELEPHONE (OUTPATIENT)
Dept: GASTROENTEROLOGY | Facility: CLINIC | Age: 68
End: 2023-11-13
Payer: MEDICARE

## 2023-11-13 DIAGNOSIS — R59.0 PELVIC LYMPHADENOPATHY: Primary | ICD-10-CM

## 2023-11-13 NOTE — TELEPHONE ENCOUNTER
Spoke with patient and went over SANJAY Gibbons result review for the CT scan. They have also read her review and recommendations via [employer] and will call to schedule with general surgery.

## 2023-11-13 NOTE — TELEPHONE ENCOUNTER
----- Message from Lizabeth Restrepo sent at 11/13/2023  3:32 PM CST -----  Contact: pt wife marcellus  Type: Needs Medical Advice  Who Called:  pt zohra germain  Best Call Back Number: [phone]   Additional Information: please call to discuss what the referral is for

## 2023-11-14 ENCOUNTER — OFFICE VISIT (OUTPATIENT)
Dept: PAIN MEDICINE | Facility: CLINIC | Age: 68
End: 2023-11-14
Payer: MEDICARE

## 2023-11-14 ENCOUNTER — TELEPHONE (OUTPATIENT)
Dept: PAIN MEDICINE | Facility: CLINIC | Age: 68
End: 2023-11-14

## 2023-11-14 ENCOUNTER — TELEPHONE (OUTPATIENT)
Dept: GASTROENTEROLOGY | Facility: CLINIC | Age: 68
End: 2023-11-14
Payer: MEDICARE

## 2023-11-14 VITALS
SYSTOLIC BLOOD PRESSURE: 122 MMHG | BODY MASS INDEX: 36.07 KG/M2 | HEIGHT: 71 IN | DIASTOLIC BLOOD PRESSURE: 65 MMHG | HEART RATE: 70 BPM | WEIGHT: 257.63 LBS

## 2023-11-14 DIAGNOSIS — M47.816 LUMBAR SPONDYLOSIS: Primary | ICD-10-CM

## 2023-11-14 DIAGNOSIS — M54.9 DORSALGIA: ICD-10-CM

## 2023-11-14 DIAGNOSIS — M48.061 SPINAL STENOSIS OF LUMBAR REGION WITHOUT NEUROGENIC CLAUDICATION: ICD-10-CM

## 2023-11-14 DIAGNOSIS — M79.18 MYOFASCIAL PAIN: ICD-10-CM

## 2023-11-14 PROCEDURE — 3044F HG A1C LEVEL LT 7.0%: CPT | Mod: CPTII,S$GLB,,

## 2023-11-14 PROCEDURE — 99214 PR OFFICE/OUTPT VISIT, EST, LEVL IV, 30-39 MIN: ICD-10-PCS | Mod: S$GLB,,,

## 2023-11-14 PROCEDURE — 1101F PR PT FALLS ASSESS DOC 0-1 FALLS W/OUT INJ PAST YR: ICD-10-PCS | Mod: CPTII,S$GLB,,

## 2023-11-14 PROCEDURE — 3066F PR DOCUMENTATION OF TREATMENT FOR NEPHROPATHY: ICD-10-PCS | Mod: CPTII,S$GLB,,

## 2023-11-14 PROCEDURE — 1125F AMNT PAIN NOTED PAIN PRSNT: CPT | Mod: CPTII,S$GLB,,

## 2023-11-14 PROCEDURE — 1101F PT FALLS ASSESS-DOCD LE1/YR: CPT | Mod: CPTII,S$GLB,,

## 2023-11-14 PROCEDURE — 3078F DIAST BP <80 MM HG: CPT | Mod: CPTII,S$GLB,,

## 2023-11-14 PROCEDURE — 1159F MED LIST DOCD IN RCRD: CPT | Mod: CPTII,S$GLB,,

## 2023-11-14 PROCEDURE — 3074F SYST BP LT 130 MM HG: CPT | Mod: CPTII,S$GLB,,

## 2023-11-14 PROCEDURE — 1125F PR PAIN SEVERITY QUANTIFIED, PAIN PRESENT: ICD-10-PCS | Mod: CPTII,S$GLB,,

## 2023-11-14 PROCEDURE — 3074F PR MOST RECENT SYSTOLIC BLOOD PRESSURE < 130 MM HG: ICD-10-PCS | Mod: CPTII,S$GLB,,

## 2023-11-14 PROCEDURE — 3066F NEPHROPATHY DOC TX: CPT | Mod: CPTII,S$GLB,,

## 2023-11-14 PROCEDURE — 3061F PR NEG MICROALBUMINURIA RESULT DOCUMENTED/REVIEW: ICD-10-PCS | Mod: CPTII,S$GLB,,

## 2023-11-14 PROCEDURE — 3288F PR FALLS RISK ASSESSMENT DOCUMENTED: ICD-10-PCS | Mod: CPTII,S$GLB,,

## 2023-11-14 PROCEDURE — 99999 PR PBB SHADOW E&M-EST. PATIENT-LVL III: CPT | Mod: PBBFAC,,,

## 2023-11-14 PROCEDURE — 4010F PR ACE/ARB THEARPY RXD/TAKEN: ICD-10-PCS | Mod: CPTII,S$GLB,,

## 2023-11-14 PROCEDURE — 3061F NEG MICROALBUMINURIA REV: CPT | Mod: CPTII,S$GLB,,

## 2023-11-14 PROCEDURE — 99214 OFFICE O/P EST MOD 30 MIN: CPT | Mod: S$GLB,,,

## 2023-11-14 PROCEDURE — 99999 PR PBB SHADOW E&M-EST. PATIENT-LVL III: ICD-10-PCS | Mod: PBBFAC,,,

## 2023-11-14 PROCEDURE — 1159F PR MEDICATION LIST DOCUMENTED IN MEDICAL RECORD: ICD-10-PCS | Mod: CPTII,S$GLB,,

## 2023-11-14 PROCEDURE — 3008F BODY MASS INDEX DOCD: CPT | Mod: CPTII,S$GLB,,

## 2023-11-14 PROCEDURE — 4010F ACE/ARB THERAPY RXD/TAKEN: CPT | Mod: CPTII,S$GLB,,

## 2023-11-14 PROCEDURE — 3288F FALL RISK ASSESSMENT DOCD: CPT | Mod: CPTII,S$GLB,,

## 2023-11-14 PROCEDURE — 3044F PR MOST RECENT HEMOGLOBIN A1C LEVEL <7.0%: ICD-10-PCS | Mod: CPTII,S$GLB,,

## 2023-11-14 PROCEDURE — 3078F PR MOST RECENT DIASTOLIC BLOOD PRESSURE < 80 MM HG: ICD-10-PCS | Mod: CPTII,S$GLB,,

## 2023-11-14 PROCEDURE — 3008F PR BODY MASS INDEX (BMI) DOCUMENTED: ICD-10-PCS | Mod: CPTII,S$GLB,,

## 2023-11-14 RX ORDER — SODIUM CHLORIDE, SODIUM LACTATE, POTASSIUM CHLORIDE, CALCIUM CHLORIDE 600; 310; 30; 20 MG/100ML; MG/100ML; MG/100ML; MG/100ML
INJECTION, SOLUTION INTRAVENOUS CONTINUOUS
Status: CANCELLED | OUTPATIENT
Start: 2023-11-14

## 2023-11-14 NOTE — TELEPHONE ENCOUNTER
----- Message from Brent Ledbetter sent at 11/14/2023  1:42 PM CST -----  Type: Needs Medical Advice  Who Called:  pt's spouse Gabi  Best Call Back Number: [phone]    Additional Information: pt's spouse stated she would like to be advised un regards to pt's results, Gabi stated someone called yesterday to discuss final result but did not take to entail details in laymans term or location of finding please call back to advise now that pt is marlene'd and did what was advised to do thanks!

## 2023-11-14 NOTE — PROGRESS NOTES
[facility] Follow Up Evaluation      Referred by: No ref. provider found    PCP:     CC:   Chief Complaint   Patient presents with    Low-back Pain          11/14/2023     9:22 AM 9/28/2023     3:18 PM   Last 3 PDI Scores   Pain Disability Index (PDI) 35 40     Interval HPI 11/14/2023: Asim Wilson returns to the office for follow up.  He returns today with continued lower back pain, 7/10, constant, worsened with physical activities, located across his lower back without any radiation into his lower extremities.  He denies any numbness, weakness or any new changes to his bowel or bladder function.  He has been taking muscle relaxers for his pain without significant relief.      HPI:   Asim Wilson is a 68 y.o. male patient who has a past medical history of Arthritis, Asthma, C. difficile colitis, Cataract, Colon polyp, Diabetic retinopathy, Gout attack, Hypertension, GERONIMO (obstructive sleep apnea), and Thyroid disease. He presents with back pain.  He has been having lower back pain for several years and has been gradually worsening.  Today his pain is 5/10 however at times it can get up to 10/10.  He reports his pain is intermittent, aching, sharp, shooting.  He is not having any radicular pain at this time.  Pain is worse with sitting, bending, walking, coughing, lifting.  He gets some relief with gout medications such as Indocin.      Pain Intervention History:      Past Spine Surgical History:      Past and current medications:  Antineuropathics:  NSAIDs:  Physical therapy:  Antidepressants:  Muscle relaxers:  Robaxin  Opioids:  Antiplatelets/Anticoagulants: aspirin     History:    Current Outpatient Medications:     allopurinoL (ZYLOPRIM) 300 MG tablet, TAKE 1 TABLET (300 MG TOTAL) BY MOUTH DAILY AS NEEDED., Disp: 90 tablet, Rfl: 3    aspirin (ECOTRIN) 81 MG EC tablet, Take 1 tablet (81 mg total) by mouth once daily., Disp: 90 tablet, Rfl: 0    colchicine, gout, (COLCRYS) 0.6 mg tablet, Take 1  tablet (0.6 mg total) by mouth 2 (two) times daily as needed (acute gout)., Disp: 60 tablet, Rfl: 11    levocetirizine (XYZAL) 5 MG tablet, TAKE 1 TABLET BY MOUTH EVERY DAY IN THE EVENING, Disp: 90 tablet, Rfl: 3    levothyroxine (SYNTHROID) 137 MCG Tab tablet, Take 1 tablet (137 mcg total) by mouth before breakfast., Disp: 90 tablet, Rfl: 3    methocarbamoL (ROBAXIN) 500 MG Tab, Take 1 tablet (500 mg total) by mouth 2 (two) times daily as needed (muscle spasms)., Disp: 20 tablet, Rfl: 0    montelukast (SINGULAIR) 10 mg tablet, TAKE 1 TABLET BY MOUTH EVERY DAY, Disp: 90 tablet, Rfl: 3    olmesartan (BENICAR) 20 MG tablet, Take 1 tablet (20 mg total) by mouth once daily., Disp: 90 tablet, Rfl: 3    rosuvastatin (CRESTOR) 10 MG tablet, TAKE 1 TABLET BY MOUTH EVERY DAY, Disp: 90 tablet, Rfl: 3    Past Medical History:   Diagnosis Date    Arthritis     Asthma     C. difficile colitis 07/01/2023    in care everywhere    Cataract     Done OU    Colon polyp     Diabetic retinopathy     Gout attack     Hypertension     GERONIMO (obstructive sleep apnea)     noncompliant CPAP    Thyroid disease        Past Surgical History:   Procedure Laterality Date    CATARACT EXTRACTION W/  INTRAOCULAR LENS IMPLANT Right 06/13/2019    Dr Moses    CATARACT EXTRACTION W/  INTRAOCULAR LENS IMPLANT Left 07/18/2019    Dr Moses    COLONOSCOPY N/A 10/18/2022    Procedure: COLONOSCOPY;  Surgeon: Rahat Jarrell MD;  Location: [facility] ENDO;  Service: Endoscopy;  Laterality: N/A; Repeat colonoscopy in 3 years for surveillance    CORONARY ANGIOGRAPHY N/A 12/10/2020    Procedure: ANGIOGRAM, CORONARY ARTERY;  Surgeon: Roseanna Barr MD;  Location: [facility] CATH;  Service: Cardiology;  Laterality: N/A;    Cyst removed      From back of neck    cyst removed      from back    LEFT HEART CATHETERIZATION Left 12/10/2020    Procedure: Left heart cath;  Surgeon: Roseanna Barr MD;  Location: [facility] CATH;  Service: Cardiology;  Laterality: Left;     "PHACOEMULSIFICATION OF CATARACT Right 06/13/2019    Procedure: PHACOEMULSIFICATION, CATARACT;  Surgeon: Gabe Moses Jr., MD;  Location: [facility] OR;  Service: Ophthalmology;  Laterality: Right;  Right    PHACOEMULSIFICATION OF CATARACT Left 07/18/2019    Procedure: PHACOEMULSIFICATION, CATARACT;  Surgeon: Gabe Moses Jr., MD;  Location: [facility] OR;  Service: Ophthalmology;  Laterality: Left;  Left       Family History   Problem Relation Age of Onset    Cancer Mother     Alzheimer's disease Father     No Known Problems Sister     No Known Problems Sister     No Known Problems Sister     No Known Problems Maternal Grandmother     No Known Problems Maternal Grandfather     No Known Problems Paternal Grandmother     No Known Problems Paternal Grandfather     Amblyopia Neg Hx     Blindness Neg Hx     Cataracts Neg Hx     Diabetes Neg Hx     Hypertension Neg Hx     Macular degeneration Neg Hx     Glaucoma Neg Hx     Retinal detachment Neg Hx     Strabismus Neg Hx     Stroke Neg Hx     Thyroid disease Neg Hx     Colon cancer Neg Hx     Crohn's disease Neg Hx     Esophageal cancer Neg Hx     Stomach cancer Neg Hx     Ulcerative colitis Neg Hx        Social History     Socioeconomic History    Marital status:    Tobacco Use    Smoking status: Former    Smokeless tobacco: Never    Tobacco comments:     Quit 40 yrs ago   Substance and Sexual Activity    Alcohol use: Not Currently     Comment: rarely    Drug use: Never       Review of patient's allergies indicates:   Allergen Reactions    Cleocin [clindamycin hcl] Other (See Comments)     ARF       Review of Systems:  12 point review of systems is negative.    Physical Exam:  Vitals:    11/14/23 0920   BP: 122/65   Pulse: 70   Weight: 116.8 kg (257 lb 9.7 oz)   Height: 5' 11" (1.803 m)   PainSc:   7   PainLoc: Back     Body mass index is 35.93 kg/m².    Gen: NAD  Psych: mood appropriate for given condition  HEENT: eyes anicteric   CV: RRR  HEENT: anicteric "   Respiratory: non-labored, no signs of respiratory distress  Abd: non-distended  Skin: warm, dry and intact.  Gait: antalgic gait.     Lumbar axial facet loading  Tenderness over the bilateral lumbar paraspinals    Sensory:  Intact and symmetrical to light touch in L1-S1 dermatomes bilaterally.    Motor:     Right Left   L2/3 Iliacus Hip flexion  5  5   L3/4 Qudratus Femoris Knee Extension  5  5   L4/5 Tib Anterior Ankle Dorsiflexion   5  5   L5/S1 Extensor Hallicus Longus Great toe extension  5  5   S1/S2 Gastroc/Soleus Plantar Flexion  5  5      Right Left                  Patellar DTR 0 0   Achilles DTR 0 0                      Labs:  Lab Results   Component Value Date    HGBA1C 5.6 09/11/2023       Lab Results   Component Value Date    WBC 16.68 (H) 10/01/2023    HGB 12.9 (L) 10/01/2023    HCT 38.3 (L) 10/01/2023    MCV 97 10/01/2023     10/01/2023           Imaging:  X-ray lumbar spine with flexion-extension 09/28/2023  FINDINGS:  Mild rotatory levoconvex curvature of the lumbar spine.  Mild grade 1 anterolisthesis of L4 on L5.  No abnormal translation with flexion or extension.  Vertebral body heights are maintained without evidence of fracture or osseous destructive process.  Multilevel mild-to-moderate disc degeneration with intervertebral disc space narrowing, degenerative endplate change and marginal osteophyte formation, most pronounced at L2-3 through L4-5.  Lower lumbar facet arthropathy.  Aortoiliac calcific atherosclerosis.    MRI lumbar spine 11/10/2023  FINDINGS:  Vertebral column: Comparison plain films demonstrated a broad levocurvature of the lumbar spine.  There is suggestion of mild anterolisthesis of L4 on L5.  This is not clearly demonstrated on MRI but there is trace retrolisthesis of L2 on L3 which is exaggerated by osteophyte formation.  There is marked disc space narrowing at the T10-11 and T11-12 levels with mild-to-moderate disc space narrowing at the T12-L1 through L2-3 levels.   There is multilevel endplate osteophyte formation.  The discs are desiccated.  Baseline marrow signal is normal.     Spinal canal, conus, epidural space: The spinal canal is small on a developmental basis.  The conus terminates at the level of T12 and is grossly normal in signal intensity.  There is no abnormal epidural mass or fluid collection.     Findings by level:     On the sagittal images, in addition to marked disc space narrowing at both the T10-11 and T11-12 levels, there are shallow disc protrusions in addition a ligamentum flavum thickening.  There is moderate spinal stenosis and some degree of bilateral foraminal stenosis without acute cord compression.     T12-L1: There is disc space narrowing, diffuse disc bulge with osteophytic ridging in addition to facet joint arthropathy.  There is only borderline to mild spinal stenosis.  There is however mild-to-moderate bilateral, left greater than right, foraminal stenosis.  L1-2: There is disc space narrowing, diffuse disc bulge with osteophytic ridging and facet joint arthropathy.  There is mild spinal stenosis with crowding of the left lateral recess.  There is mild-to-moderate left but only mild right foraminal stenosis.  L2-3: There is disc space narrowing, trace retrolisthesis of L2 on L3.  There is a diffuse disc bulge with osteophytic ridging eccentric to the right.  There is right greater than left facet joint arthropathy.  There is moderate spinal stenosis with crowding of the right lateral recess as well as severe right and moderate to severe left foraminal stenosis.  L3-4: There is a diffuse disc bulge with osteophytic ridging in addition to bilateral facet joint arthropathy resulting in moderate spinal stenosis, severe crowding of the lateral recess bilaterally as well as severe bilateral foraminal stenosis.  L4-5: There is a diffuse disc bulge with superimposed shallow broad central disc protrusion.  There is marked facet joint arthropathy with  ligamentum flavum thickening.  There is severe spinal canal, bilateral lateral recess and foraminal stenosis.  L5-S1: There is left greater than right facet joint arthropathy.  There is no spinal stenosis.  There is moderate to marked left and only mild right foraminal stenosis.     Soft tissues, other: The posterior spinous muscles are mildly atrophic.  The prevertebral soft tissues appear normal.  The aorta is normal in caliber.  There is no hydronephrosis but there is a right renal cyst measuring approximately 10 mm.  This is present on concurrent CT abdomen and pelvis.     Impression:     1. There is multilevel degenerative disc and facet disease.  These findings are present in the setting of a spinal canal which is small on a developmental basis.  There is no fracture.  Question mild anterolisthesis of L4 on L5 by plain film evaluation is not confirmed on MRI but there is trace retrolisthesis of L2 on L3 which is exaggerated by osteophyte formation.  There is some degree of disc bulge or protrusion in addition to facet joint arthropathy at multiple levels.  There is multilevel spinal canal, lateral recess and foraminal stenosis.  These findings are discussed in detail by level above.    Assessment:   Problem List Items Addressed This Visit    None  Visit Diagnoses       Lumbar spondylosis    -  Primary    Myofascial pain        Dorsalgia        Spinal stenosis of lumbar region without neurogenic claudication                    Asim Wilson is a 68 y.o. male patient who has a past medical history of Arthritis, Asthma, C. difficile colitis, Cataract, Colon polyp, Diabetic retinopathy, Gout attack, Hypertension, GERONIMO (obstructive sleep apnea), and Thyroid disease. He presents with back pain.  He has been having lower back pain for several years and has been gradually worsening.  Today his pain is 5/10 however at times it can get up to 10/10.  He reports his pain is intermittent, aching, sharp, shooting.  He is  not having any radicular pain at this time.  Pain is worse with sitting, bending, walking, coughing, lifting.  He gets some relief with gout medications such as Indocin.    11/14/2023: Asim Wilson returns to the office for follow up.  He returns today with continued lower back pain, 7/10, constant, worsened with physical activities, located across his lower back without any radiation into his lower extremities.  He denies any numbness, weakness or any new changes to his bowel or bladder function.  He has been taking muscle relaxers for his pain without significant relief.    - on exam he has full strength in his lower extremities and intact sensation to light touch bilateral L2-S1.  He has reproducible pain with lumbar axial facet loading.   - we reviewed his lumbar MRI in office today and is consistent with L3-4: diffuse disc bulge bilateral facet joint arthropathy resulting in moderate spinal stenosis, severe crowding of the lateral recess bilaterally as well as severe bilateral foraminal stenosis.L4-5: diffuse disc bulge with superimposed shallow broad central disc protrusion, marked facet joint arthropathy, severe spinal canal, bilateral lateral recess and foraminal stenosis. L5-S1:  facet joint arthropathy.   - he continues to have pain that limits his mobility interferes with his ADLs and quality of life.  - no relief with muscle relaxers.  He avoids NSAIDs due to recent acute kidney failure.  His pain has become too severe to fully participate in formal physical therapy.  - he is not describing any symptoms of neurogenic claudication or any radicular symptoms.  I believe his pain is likely axial facet mediated pain.  - for his continued pain I would like to schedule him for a bilateral L4/5 and L5/S1 diagnostic medial branch blocks.  If successful we will repeat the blocks prior to proceeding with radiofrequency ablation.  - follow-up 4 weeks post procedure or sooner if needed.  If he fails to get relief  with this can consider lumbar epidural.        : Not applicable      This note was completed with dictation software and grammatical errors may exist.

## 2023-11-14 NOTE — H&P (VIEW-ONLY)
[facility] Follow Up Evaluation      Referred by: No ref. provider found    PCP:     CC:   Chief Complaint   Patient presents with    Low-back Pain          11/14/2023     9:22 AM 9/28/2023     3:18 PM   Last 3 PDI Scores   Pain Disability Index (PDI) 35 40     Interval HPI 11/14/2023: Asim Wilson returns to the office for follow up.  He returns today with continued lower back pain, 7/10, constant, worsened with physical activities, located across his lower back without any radiation into his lower extremities.  He denies any numbness, weakness or any new changes to his bowel or bladder function.  He has been taking muscle relaxers for his pain without significant relief.      HPI:   Asim Wilson is a 68 y.o. male patient who has a past medical history of Arthritis, Asthma, C. difficile colitis, Cataract, Colon polyp, Diabetic retinopathy, Gout attack, Hypertension, GERONIMO (obstructive sleep apnea), and Thyroid disease. He presents with back pain.  He has been having lower back pain for several years and has been gradually worsening.  Today his pain is 5/10 however at times it can get up to 10/10.  He reports his pain is intermittent, aching, sharp, shooting.  He is not having any radicular pain at this time.  Pain is worse with sitting, bending, walking, coughing, lifting.  He gets some relief with gout medications such as Indocin.      Pain Intervention History:      Past Spine Surgical History:      Past and current medications:  Antineuropathics:  NSAIDs:  Physical therapy:  Antidepressants:  Muscle relaxers:  Robaxin  Opioids:  Antiplatelets/Anticoagulants: aspirin     History:    Current Outpatient Medications:     allopurinoL (ZYLOPRIM) 300 MG tablet, TAKE 1 TABLET (300 MG TOTAL) BY MOUTH DAILY AS NEEDED., Disp: 90 tablet, Rfl: 3    aspirin (ECOTRIN) 81 MG EC tablet, Take 1 tablet (81 mg total) by mouth once daily., Disp: 90 tablet, Rfl: 0    colchicine, gout, (COLCRYS) 0.6 mg tablet, Take 1  tablet (0.6 mg total) by mouth 2 (two) times daily as needed (acute gout)., Disp: 60 tablet, Rfl: 11    levocetirizine (XYZAL) 5 MG tablet, TAKE 1 TABLET BY MOUTH EVERY DAY IN THE EVENING, Disp: 90 tablet, Rfl: 3    levothyroxine (SYNTHROID) 137 MCG Tab tablet, Take 1 tablet (137 mcg total) by mouth before breakfast., Disp: 90 tablet, Rfl: 3    methocarbamoL (ROBAXIN) 500 MG Tab, Take 1 tablet (500 mg total) by mouth 2 (two) times daily as needed (muscle spasms)., Disp: 20 tablet, Rfl: 0    montelukast (SINGULAIR) 10 mg tablet, TAKE 1 TABLET BY MOUTH EVERY DAY, Disp: 90 tablet, Rfl: 3    olmesartan (BENICAR) 20 MG tablet, Take 1 tablet (20 mg total) by mouth once daily., Disp: 90 tablet, Rfl: 3    rosuvastatin (CRESTOR) 10 MG tablet, TAKE 1 TABLET BY MOUTH EVERY DAY, Disp: 90 tablet, Rfl: 3    Past Medical History:   Diagnosis Date    Arthritis     Asthma     C. difficile colitis 07/01/2023    in care everywhere    Cataract     Done OU    Colon polyp     Diabetic retinopathy     Gout attack     Hypertension     GERONIMO (obstructive sleep apnea)     noncompliant CPAP    Thyroid disease        Past Surgical History:   Procedure Laterality Date    CATARACT EXTRACTION W/  INTRAOCULAR LENS IMPLANT Right 06/13/2019    Dr Moses    CATARACT EXTRACTION W/  INTRAOCULAR LENS IMPLANT Left 07/18/2019    Dr Moses    COLONOSCOPY N/A 10/18/2022    Procedure: COLONOSCOPY;  Surgeon: Rahat Jarrell MD;  Location: [facility] ENDO;  Service: Endoscopy;  Laterality: N/A; Repeat colonoscopy in 3 years for surveillance    CORONARY ANGIOGRAPHY N/A 12/10/2020    Procedure: ANGIOGRAM, CORONARY ARTERY;  Surgeon: Roseanna Barr MD;  Location: [facility] CATH;  Service: Cardiology;  Laterality: N/A;    Cyst removed      From back of neck    cyst removed      from back    LEFT HEART CATHETERIZATION Left 12/10/2020    Procedure: Left heart cath;  Surgeon: Roseanna Barr MD;  Location: [facility] CATH;  Service: Cardiology;  Laterality: Left;     "PHACOEMULSIFICATION OF CATARACT Right 06/13/2019    Procedure: PHACOEMULSIFICATION, CATARACT;  Surgeon: Gabe Moses Jr., MD;  Location: [facility] OR;  Service: Ophthalmology;  Laterality: Right;  Right    PHACOEMULSIFICATION OF CATARACT Left 07/18/2019    Procedure: PHACOEMULSIFICATION, CATARACT;  Surgeon: Gabe Moses Jr., MD;  Location: [facility] OR;  Service: Ophthalmology;  Laterality: Left;  Left       Family History   Problem Relation Age of Onset    Cancer Mother     Alzheimer's disease Father     No Known Problems Sister     No Known Problems Sister     No Known Problems Sister     No Known Problems Maternal Grandmother     No Known Problems Maternal Grandfather     No Known Problems Paternal Grandmother     No Known Problems Paternal Grandfather     Amblyopia Neg Hx     Blindness Neg Hx     Cataracts Neg Hx     Diabetes Neg Hx     Hypertension Neg Hx     Macular degeneration Neg Hx     Glaucoma Neg Hx     Retinal detachment Neg Hx     Strabismus Neg Hx     Stroke Neg Hx     Thyroid disease Neg Hx     Colon cancer Neg Hx     Crohn's disease Neg Hx     Esophageal cancer Neg Hx     Stomach cancer Neg Hx     Ulcerative colitis Neg Hx        Social History     Socioeconomic History    Marital status:    Tobacco Use    Smoking status: Former    Smokeless tobacco: Never    Tobacco comments:     Quit 40 yrs ago   Substance and Sexual Activity    Alcohol use: Not Currently     Comment: rarely    Drug use: Never       Review of patient's allergies indicates:   Allergen Reactions    Cleocin [clindamycin hcl] Other (See Comments)     ARF       Review of Systems:  12 point review of systems is negative.    Physical Exam:  Vitals:    11/14/23 0920   BP: 122/65   Pulse: 70   Weight: 116.8 kg (257 lb 9.7 oz)   Height: 5' 11" (1.803 m)   PainSc:   7   PainLoc: Back     Body mass index is 35.93 kg/m².    Gen: NAD  Psych: mood appropriate for given condition  HEENT: eyes anicteric   CV: RRR  HEENT: anicteric "   Respiratory: non-labored, no signs of respiratory distress  Abd: non-distended  Skin: warm, dry and intact.  Gait: antalgic gait.     Lumbar axial facet loading  Tenderness over the bilateral lumbar paraspinals    Sensory:  Intact and symmetrical to light touch in L1-S1 dermatomes bilaterally.    Motor:     Right Left   L2/3 Iliacus Hip flexion  5  5   L3/4 Qudratus Femoris Knee Extension  5  5   L4/5 Tib Anterior Ankle Dorsiflexion   5  5   L5/S1 Extensor Hallicus Longus Great toe extension  5  5   S1/S2 Gastroc/Soleus Plantar Flexion  5  5      Right Left                  Patellar DTR 0 0   Achilles DTR 0 0                      Labs:  Lab Results   Component Value Date    HGBA1C 5.6 09/11/2023       Lab Results   Component Value Date    WBC 16.68 (H) 10/01/2023    HGB 12.9 (L) 10/01/2023    HCT 38.3 (L) 10/01/2023    MCV 97 10/01/2023     10/01/2023           Imaging:  X-ray lumbar spine with flexion-extension 09/28/2023  FINDINGS:  Mild rotatory levoconvex curvature of the lumbar spine.  Mild grade 1 anterolisthesis of L4 on L5.  No abnormal translation with flexion or extension.  Vertebral body heights are maintained without evidence of fracture or osseous destructive process.  Multilevel mild-to-moderate disc degeneration with intervertebral disc space narrowing, degenerative endplate change and marginal osteophyte formation, most pronounced at L2-3 through L4-5.  Lower lumbar facet arthropathy.  Aortoiliac calcific atherosclerosis.    MRI lumbar spine 11/10/2023  FINDINGS:  Vertebral column: Comparison plain films demonstrated a broad levocurvature of the lumbar spine.  There is suggestion of mild anterolisthesis of L4 on L5.  This is not clearly demonstrated on MRI but there is trace retrolisthesis of L2 on L3 which is exaggerated by osteophyte formation.  There is marked disc space narrowing at the T10-11 and T11-12 levels with mild-to-moderate disc space narrowing at the T12-L1 through L2-3 levels.   There is multilevel endplate osteophyte formation.  The discs are desiccated.  Baseline marrow signal is normal.     Spinal canal, conus, epidural space: The spinal canal is small on a developmental basis.  The conus terminates at the level of T12 and is grossly normal in signal intensity.  There is no abnormal epidural mass or fluid collection.     Findings by level:     On the sagittal images, in addition to marked disc space narrowing at both the T10-11 and T11-12 levels, there are shallow disc protrusions in addition a ligamentum flavum thickening.  There is moderate spinal stenosis and some degree of bilateral foraminal stenosis without acute cord compression.     T12-L1: There is disc space narrowing, diffuse disc bulge with osteophytic ridging in addition to facet joint arthropathy.  There is only borderline to mild spinal stenosis.  There is however mild-to-moderate bilateral, left greater than right, foraminal stenosis.  L1-2: There is disc space narrowing, diffuse disc bulge with osteophytic ridging and facet joint arthropathy.  There is mild spinal stenosis with crowding of the left lateral recess.  There is mild-to-moderate left but only mild right foraminal stenosis.  L2-3: There is disc space narrowing, trace retrolisthesis of L2 on L3.  There is a diffuse disc bulge with osteophytic ridging eccentric to the right.  There is right greater than left facet joint arthropathy.  There is moderate spinal stenosis with crowding of the right lateral recess as well as severe right and moderate to severe left foraminal stenosis.  L3-4: There is a diffuse disc bulge with osteophytic ridging in addition to bilateral facet joint arthropathy resulting in moderate spinal stenosis, severe crowding of the lateral recess bilaterally as well as severe bilateral foraminal stenosis.  L4-5: There is a diffuse disc bulge with superimposed shallow broad central disc protrusion.  There is marked facet joint arthropathy with  ligamentum flavum thickening.  There is severe spinal canal, bilateral lateral recess and foraminal stenosis.  L5-S1: There is left greater than right facet joint arthropathy.  There is no spinal stenosis.  There is moderate to marked left and only mild right foraminal stenosis.     Soft tissues, other: The posterior spinous muscles are mildly atrophic.  The prevertebral soft tissues appear normal.  The aorta is normal in caliber.  There is no hydronephrosis but there is a right renal cyst measuring approximately 10 mm.  This is present on concurrent CT abdomen and pelvis.     Impression:     1. There is multilevel degenerative disc and facet disease.  These findings are present in the setting of a spinal canal which is small on a developmental basis.  There is no fracture.  Question mild anterolisthesis of L4 on L5 by plain film evaluation is not confirmed on MRI but there is trace retrolisthesis of L2 on L3 which is exaggerated by osteophyte formation.  There is some degree of disc bulge or protrusion in addition to facet joint arthropathy at multiple levels.  There is multilevel spinal canal, lateral recess and foraminal stenosis.  These findings are discussed in detail by level above.    Assessment:   Problem List Items Addressed This Visit    None  Visit Diagnoses       Lumbar spondylosis    -  Primary    Myofascial pain        Dorsalgia        Spinal stenosis of lumbar region without neurogenic claudication                    Asim Wilson is a 68 y.o. male patient who has a past medical history of Arthritis, Asthma, C. difficile colitis, Cataract, Colon polyp, Diabetic retinopathy, Gout attack, Hypertension, GERONIMO (obstructive sleep apnea), and Thyroid disease. He presents with back pain.  He has been having lower back pain for several years and has been gradually worsening.  Today his pain is 5/10 however at times it can get up to 10/10.  He reports his pain is intermittent, aching, sharp, shooting.  He is  not having any radicular pain at this time.  Pain is worse with sitting, bending, walking, coughing, lifting.  He gets some relief with gout medications such as Indocin.    11/14/2023: Asim Wilson returns to the office for follow up.  He returns today with continued lower back pain, 7/10, constant, worsened with physical activities, located across his lower back without any radiation into his lower extremities.  He denies any numbness, weakness or any new changes to his bowel or bladder function.  He has been taking muscle relaxers for his pain without significant relief.    - on exam he has full strength in his lower extremities and intact sensation to light touch bilateral L2-S1.  He has reproducible pain with lumbar axial facet loading.   - we reviewed his lumbar MRI in office today and is consistent with L3-4: diffuse disc bulge bilateral facet joint arthropathy resulting in moderate spinal stenosis, severe crowding of the lateral recess bilaterally as well as severe bilateral foraminal stenosis.L4-5: diffuse disc bulge with superimposed shallow broad central disc protrusion, marked facet joint arthropathy, severe spinal canal, bilateral lateral recess and foraminal stenosis. L5-S1:  facet joint arthropathy.   - he continues to have pain that limits his mobility interferes with his ADLs and quality of life.  - no relief with muscle relaxers.  He avoids NSAIDs due to recent acute kidney failure.  His pain has become too severe to fully participate in formal physical therapy.  - he is not describing any symptoms of neurogenic claudication or any radicular symptoms.  I believe his pain is likely axial facet mediated pain.  - for his continued pain I would like to schedule him for a bilateral L4/5 and L5/S1 diagnostic medial branch blocks.  If successful we will repeat the blocks prior to proceeding with radiofrequency ablation.  - follow-up 4 weeks post procedure or sooner if needed.  If he fails to get relief  with this can consider lumbar epidural.        : Not applicable      This note was completed with dictation software and grammatical errors may exist.

## 2023-11-14 NOTE — TELEPHONE ENCOUNTER
Called and spoke to patient and his wife. Pre op information given. Patient verbalized understanding

## 2023-11-14 NOTE — TELEPHONE ENCOUNTER
Returned pt call, spoke to pt wife about results per Pallavi Alicea NP. Pt wife verbalized understanding. Will follow up with general surgery.

## 2023-11-14 NOTE — TELEPHONE ENCOUNTER
Please schedule patient for the following procedure:    Physician - Dr Gutierrez    Type of Procedure/Injection - Lumbar Medial Branch Block  L4/5 and L5/S1           Laterality - Bilateral      Anxiolysis- RNIV      Need to hold medication - No      N/A      Clearance needed - No      Follow up - phone call next day

## 2023-11-15 ENCOUNTER — PATIENT OUTREACH (OUTPATIENT)
Dept: ADMINISTRATIVE | Facility: HOSPITAL | Age: 68
End: 2023-11-15
Payer: MEDICARE

## 2023-11-15 ENCOUNTER — OFFICE VISIT (OUTPATIENT)
Dept: SURGERY | Facility: CLINIC | Age: 68
End: 2023-11-15
Payer: MEDICARE

## 2023-11-15 VITALS
DIASTOLIC BLOOD PRESSURE: 68 MMHG | TEMPERATURE: 98 F | SYSTOLIC BLOOD PRESSURE: 120 MMHG | BODY MASS INDEX: 36.35 KG/M2 | WEIGHT: 259.69 LBS | HEIGHT: 71 IN | HEART RATE: 58 BPM

## 2023-11-15 DIAGNOSIS — R59.0 LOCALIZED ENLARGED LYMPH NODES: Primary | ICD-10-CM

## 2023-11-15 DIAGNOSIS — R59.0 PELVIC LYMPHADENOPATHY: ICD-10-CM

## 2023-11-15 PROCEDURE — 3061F PR NEG MICROALBUMINURIA RESULT DOCUMENTED/REVIEW: ICD-10-PCS | Mod: CPTII,S$GLB,, | Performed by: SURGERY

## 2023-11-15 PROCEDURE — 3288F PR FALLS RISK ASSESSMENT DOCUMENTED: ICD-10-PCS | Mod: CPTII,S$GLB,, | Performed by: SURGERY

## 2023-11-15 PROCEDURE — 4010F PR ACE/ARB THEARPY RXD/TAKEN: ICD-10-PCS | Mod: CPTII,S$GLB,, | Performed by: SURGERY

## 2023-11-15 PROCEDURE — 99999 PR PBB SHADOW E&M-EST. PATIENT-LVL III: ICD-10-PCS | Mod: PBBFAC,,, | Performed by: SURGERY

## 2023-11-15 PROCEDURE — 3008F PR BODY MASS INDEX (BMI) DOCUMENTED: ICD-10-PCS | Mod: CPTII,S$GLB,, | Performed by: SURGERY

## 2023-11-15 PROCEDURE — 3044F HG A1C LEVEL LT 7.0%: CPT | Mod: CPTII,S$GLB,, | Performed by: SURGERY

## 2023-11-15 PROCEDURE — 4010F ACE/ARB THERAPY RXD/TAKEN: CPT | Mod: CPTII,S$GLB,, | Performed by: SURGERY

## 2023-11-15 PROCEDURE — 1159F MED LIST DOCD IN RCRD: CPT | Mod: CPTII,S$GLB,, | Performed by: SURGERY

## 2023-11-15 PROCEDURE — 3078F PR MOST RECENT DIASTOLIC BLOOD PRESSURE < 80 MM HG: ICD-10-PCS | Mod: CPTII,S$GLB,, | Performed by: SURGERY

## 2023-11-15 PROCEDURE — 3288F FALL RISK ASSESSMENT DOCD: CPT | Mod: CPTII,S$GLB,, | Performed by: SURGERY

## 2023-11-15 PROCEDURE — 3061F NEG MICROALBUMINURIA REV: CPT | Mod: CPTII,S$GLB,, | Performed by: SURGERY

## 2023-11-15 PROCEDURE — 3078F DIAST BP <80 MM HG: CPT | Mod: CPTII,S$GLB,, | Performed by: SURGERY

## 2023-11-15 PROCEDURE — 99204 OFFICE O/P NEW MOD 45 MIN: CPT | Mod: S$GLB,,, | Performed by: SURGERY

## 2023-11-15 PROCEDURE — 3074F SYST BP LT 130 MM HG: CPT | Mod: CPTII,S$GLB,, | Performed by: SURGERY

## 2023-11-15 PROCEDURE — 3008F BODY MASS INDEX DOCD: CPT | Mod: CPTII,S$GLB,, | Performed by: SURGERY

## 2023-11-15 PROCEDURE — 1101F PT FALLS ASSESS-DOCD LE1/YR: CPT | Mod: CPTII,S$GLB,, | Performed by: SURGERY

## 2023-11-15 PROCEDURE — 3066F NEPHROPATHY DOC TX: CPT | Mod: CPTII,S$GLB,, | Performed by: SURGERY

## 2023-11-15 PROCEDURE — 99204 PR OFFICE/OUTPT VISIT, NEW, LEVL IV, 45-59 MIN: ICD-10-PCS | Mod: S$GLB,,, | Performed by: SURGERY

## 2023-11-15 PROCEDURE — 1125F PR PAIN SEVERITY QUANTIFIED, PAIN PRESENT: ICD-10-PCS | Mod: CPTII,S$GLB,, | Performed by: SURGERY

## 2023-11-15 PROCEDURE — 3044F PR MOST RECENT HEMOGLOBIN A1C LEVEL <7.0%: ICD-10-PCS | Mod: CPTII,S$GLB,, | Performed by: SURGERY

## 2023-11-15 PROCEDURE — 1159F PR MEDICATION LIST DOCUMENTED IN MEDICAL RECORD: ICD-10-PCS | Mod: CPTII,S$GLB,, | Performed by: SURGERY

## 2023-11-15 PROCEDURE — 1101F PR PT FALLS ASSESS DOC 0-1 FALLS W/OUT INJ PAST YR: ICD-10-PCS | Mod: CPTII,S$GLB,, | Performed by: SURGERY

## 2023-11-15 PROCEDURE — 99999 PR PBB SHADOW E&M-EST. PATIENT-LVL III: CPT | Mod: PBBFAC,,, | Performed by: SURGERY

## 2023-11-15 PROCEDURE — 3066F PR DOCUMENTATION OF TREATMENT FOR NEPHROPATHY: ICD-10-PCS | Mod: CPTII,S$GLB,, | Performed by: SURGERY

## 2023-11-15 PROCEDURE — 1125F AMNT PAIN NOTED PAIN PRSNT: CPT | Mod: CPTII,S$GLB,, | Performed by: SURGERY

## 2023-11-15 PROCEDURE — 3074F PR MOST RECENT SYSTOLIC BLOOD PRESSURE < 130 MM HG: ICD-10-PCS | Mod: CPTII,S$GLB,, | Performed by: SURGERY

## 2023-11-15 NOTE — PROGRESS NOTES
Updates were requested from care everywhere.  Health Maintenance has been updated.  [facility] immunization registry triggered.  Immunizations were reconciled.  Per JAYLEEN in basket message, pt declined flu vaccine 11/15/2023.

## 2023-11-15 NOTE — PROGRESS NOTES
Subjective     Patient ID: Asim Wilson is a 68 y.o. male.    Chief Complaint: Consult (Pelvic lymphadenopathy)    HPI   this is a pleasant 68-year-old gentleman who was referred to me for evaluation of inguinal lymphadenopathy.  Patient notes that he had been having some lower back and hip pain.  As result of this he had a CT scan of the abdomen and pelvis.  On the CT scan in early October there was identification of inguinal adenopathy.  He had repeat CT scan November 10th demonstrating findings of persistent right inguinal lymphadenopathy.  He was referred to me for evaluation.  He denies any fevers or chills no night sweats.  Denies any pain or palpable abnormalities in the inguinal area.  Patient did have a colonoscopy performed last October with a large tubulovillous adenoma removed the did have focal high-grade dysplasia.  Review of Systems   Constitutional:  Negative for activity change and appetite change.   Respiratory:  Negative for apnea and chest tightness.    Cardiovascular:  Negative for chest pain.   Gastrointestinal:  Negative for abdominal distention.          Objective     Physical Exam  Vitals reviewed.   Constitutional:       Appearance: He is obese.   Cardiovascular:      Rate and Rhythm: Normal rate.      Pulses: Normal pulses.      Heart sounds: No murmur heard.  Pulmonary:      Effort: Pulmonary effort is normal.   Abdominal:      General: Abdomen is flat. Bowel sounds are normal. There is no distension.   Musculoskeletal:         General: Normal range of motion.   Skin:     General: Skin is warm.      Comments: No palpable lymphadenopathy appreciated in R groin   Neurological:      Mental Status: He is alert.   Psychiatric:         Mood and Affect: Mood normal.       Ct scan reviewed.  Large R inguinal lymph node 2.3x1.5 cm noted.  Enlarged pelvic nodes.      Assessment and Plan     1. Localized enlarged lymph nodes  -     CT Abdomen Pelvis With IV Contrast; Future; Expected date:  11/16/2023    2. Pelvic lymphadenopathy  -     Ambulatory referral/consult to General Surgery  -     CT Abdomen Pelvis With IV Contrast; Future; Expected date: 11/16/2023        Discussion with patient.  I have offered operative excisional biopsy of the inguinal lymph node.  Would likely ask radiology to perform ultrasound to stephanie the exact location as this was not palpable.  Patient's body habitus does make identification of the lymph nodes somewhat difficult.  Alternatively could wait and repeat the CT scan about 6 weeks time to determine if the lymphadenopathy is still present.  If it were present at that time within proceed with surgery.  Patient notes that he would like to avoid surgery at the present time.  They prefer observation.  They will have repeat CT scan early January.  Pending the results this may require further surgical excision.         No follow-ups on file.

## 2023-11-16 ENCOUNTER — TELEPHONE (OUTPATIENT)
Dept: SURGERY | Facility: CLINIC | Age: 68
End: 2023-11-16
Payer: MEDICARE

## 2023-11-16 NOTE — TELEPHONE ENCOUNTER
I called and spoke to patients wife to inform her that I scheduled the CT on Friday, 12/8/23 @ 11:45am.  He needs to fast for 4hrs prior and arrive @ 10:45am in [place].  She stated that her  cannot come on a Friday.  Awaiting Armida in Radiology to let me know if she has a Monday morning.  I'll call her back to let her know of the new appointment.  Alfredo

## 2023-11-16 NOTE — TELEPHONE ENCOUNTER
I called and spoke to patients wife and informed her that I'm waiting on Armida in Radiology to let me know the next available morning appointment to schedule his CT Scan.  I'll call her back as soon as I get the appointment scheduled.  Alfredo

## 2023-11-16 NOTE — TELEPHONE ENCOUNTER
Patient and she stated that Dr. Kingsley wanted him to wait 3 months before he has another CT Scan to see if the lymph nodes have gone down.  I informed her that I'll ask Dr. Kingsley and call her back.  Alfredo

## 2023-11-16 NOTE — TELEPHONE ENCOUNTER
I called and spoke to patients wife to inform her that I scheduled him on Monday, 1/8/24 @ 10:45am in [place].  He is to fast for 4hrs prior and come in 1hr before. Alfredo

## 2023-11-20 ENCOUNTER — TELEPHONE (OUTPATIENT)
Dept: NEPHROLOGY | Facility: CLINIC | Age: 68
End: 2023-11-20
Payer: MEDICARE

## 2023-11-20 RX ORDER — FUROSEMIDE 20 MG/1
20 TABLET ORAL DAILY
Qty: 30 TABLET | Refills: 0 | Status: SHIPPED | OUTPATIENT
Start: 2023-11-20 | End: 2024-02-09 | Stop reason: SDUPTHER

## 2023-11-20 NOTE — TELEPHONE ENCOUNTER
Patient walk-in Bilateral swelling in feet, not above ankles.  Denies swelling in any other areas and no shortness of breath.     They want to know if he can have a fluid pill.     They have difficulty travelling, as neither of them drive .

## 2023-11-24 ENCOUNTER — TELEPHONE (OUTPATIENT)
Dept: PAIN MEDICINE | Facility: CLINIC | Age: 68
End: 2023-11-24
Payer: MEDICARE

## 2023-11-24 NOTE — TELEPHONE ENCOUNTER
----- Message from Paola Jama sent at 11/24/2023 12:19 PM CST -----  Regarding: Needs return call  Type: Needs Medical Advice  Who Called:  Gabi العلي    Best Call Back Number: [phone]  Additional Information: they are trying to figure out if he is going to need someone to drive him after his nerve block procedure, please call to juwan

## 2023-11-28 ENCOUNTER — OFFICE VISIT (OUTPATIENT)
Dept: PRIMARY CARE CLINIC | Facility: CLINIC | Age: 68
End: 2023-11-28
Payer: MEDICARE

## 2023-11-28 VITALS
OXYGEN SATURATION: 98 % | HEART RATE: 71 BPM | DIASTOLIC BLOOD PRESSURE: 58 MMHG | BODY MASS INDEX: 35.56 KG/M2 | SYSTOLIC BLOOD PRESSURE: 124 MMHG | HEIGHT: 71 IN | WEIGHT: 254 LBS

## 2023-11-28 DIAGNOSIS — E11.69 TYPE 2 DIABETES MELLITUS WITH OTHER SPECIFIED COMPLICATION, WITHOUT LONG-TERM CURRENT USE OF INSULIN: ICD-10-CM

## 2023-11-28 DIAGNOSIS — G62.9 NEUROPATHY: Primary | ICD-10-CM

## 2023-11-28 DIAGNOSIS — E53.8 VITAMIN B 12 DEFICIENCY: ICD-10-CM

## 2023-11-28 LAB
ESTIMATED AVG GLUCOSE: 117 MG/DL (ref 68–131)
HBA1C MFR BLD: 5.7 % (ref 4–5.6)
VIT B12 SERPL-MCNC: 279 PG/ML (ref 210–950)

## 2023-11-28 PROCEDURE — 82607 VITAMIN B-12: CPT | Performed by: PHYSICIAN ASSISTANT

## 2023-11-28 PROCEDURE — 3074F SYST BP LT 130 MM HG: CPT | Mod: CPTII,S$GLB,, | Performed by: PHYSICIAN ASSISTANT

## 2023-11-28 PROCEDURE — 3078F PR MOST RECENT DIASTOLIC BLOOD PRESSURE < 80 MM HG: ICD-10-PCS | Mod: CPTII,S$GLB,, | Performed by: PHYSICIAN ASSISTANT

## 2023-11-28 PROCEDURE — 99214 OFFICE O/P EST MOD 30 MIN: CPT | Mod: S$GLB,,, | Performed by: PHYSICIAN ASSISTANT

## 2023-11-28 PROCEDURE — 36415 PR COLLECTION VENOUS BLOOD,VENIPUNCTURE: ICD-10-PCS | Mod: S$GLB,,, | Performed by: PHYSICIAN ASSISTANT

## 2023-11-28 PROCEDURE — 1159F PR MEDICATION LIST DOCUMENTED IN MEDICAL RECORD: ICD-10-PCS | Mod: CPTII,S$GLB,, | Performed by: PHYSICIAN ASSISTANT

## 2023-11-28 PROCEDURE — 99214 PR OFFICE/OUTPT VISIT, EST, LEVL IV, 30-39 MIN: ICD-10-PCS | Mod: S$GLB,,, | Performed by: PHYSICIAN ASSISTANT

## 2023-11-28 PROCEDURE — 3008F PR BODY MASS INDEX (BMI) DOCUMENTED: ICD-10-PCS | Mod: CPTII,S$GLB,, | Performed by: PHYSICIAN ASSISTANT

## 2023-11-28 PROCEDURE — 1101F PT FALLS ASSESS-DOCD LE1/YR: CPT | Mod: CPTII,S$GLB,, | Performed by: PHYSICIAN ASSISTANT

## 2023-11-28 PROCEDURE — 36415 COLL VENOUS BLD VENIPUNCTURE: CPT | Mod: S$GLB,,, | Performed by: PHYSICIAN ASSISTANT

## 2023-11-28 PROCEDURE — 4010F PR ACE/ARB THEARPY RXD/TAKEN: ICD-10-PCS | Mod: CPTII,S$GLB,, | Performed by: PHYSICIAN ASSISTANT

## 2023-11-28 PROCEDURE — 3288F PR FALLS RISK ASSESSMENT DOCUMENTED: ICD-10-PCS | Mod: CPTII,S$GLB,, | Performed by: PHYSICIAN ASSISTANT

## 2023-11-28 PROCEDURE — 1125F PR PAIN SEVERITY QUANTIFIED, PAIN PRESENT: ICD-10-PCS | Mod: CPTII,S$GLB,, | Performed by: PHYSICIAN ASSISTANT

## 2023-11-28 PROCEDURE — 3074F PR MOST RECENT SYSTOLIC BLOOD PRESSURE < 130 MM HG: ICD-10-PCS | Mod: CPTII,S$GLB,, | Performed by: PHYSICIAN ASSISTANT

## 2023-11-28 PROCEDURE — 3061F NEG MICROALBUMINURIA REV: CPT | Mod: CPTII,S$GLB,, | Performed by: PHYSICIAN ASSISTANT

## 2023-11-28 PROCEDURE — 4010F ACE/ARB THERAPY RXD/TAKEN: CPT | Mod: CPTII,S$GLB,, | Performed by: PHYSICIAN ASSISTANT

## 2023-11-28 PROCEDURE — 3066F NEPHROPATHY DOC TX: CPT | Mod: CPTII,S$GLB,, | Performed by: PHYSICIAN ASSISTANT

## 2023-11-28 PROCEDURE — 3044F PR MOST RECENT HEMOGLOBIN A1C LEVEL <7.0%: ICD-10-PCS | Mod: CPTII,S$GLB,, | Performed by: PHYSICIAN ASSISTANT

## 2023-11-28 PROCEDURE — 3078F DIAST BP <80 MM HG: CPT | Mod: CPTII,S$GLB,, | Performed by: PHYSICIAN ASSISTANT

## 2023-11-28 PROCEDURE — 3061F PR NEG MICROALBUMINURIA RESULT DOCUMENTED/REVIEW: ICD-10-PCS | Mod: CPTII,S$GLB,, | Performed by: PHYSICIAN ASSISTANT

## 2023-11-28 PROCEDURE — 3288F FALL RISK ASSESSMENT DOCD: CPT | Mod: CPTII,S$GLB,, | Performed by: PHYSICIAN ASSISTANT

## 2023-11-28 PROCEDURE — 1125F AMNT PAIN NOTED PAIN PRSNT: CPT | Mod: CPTII,S$GLB,, | Performed by: PHYSICIAN ASSISTANT

## 2023-11-28 PROCEDURE — 3008F BODY MASS INDEX DOCD: CPT | Mod: CPTII,S$GLB,, | Performed by: PHYSICIAN ASSISTANT

## 2023-11-28 PROCEDURE — 3066F PR DOCUMENTATION OF TREATMENT FOR NEPHROPATHY: ICD-10-PCS | Mod: CPTII,S$GLB,, | Performed by: PHYSICIAN ASSISTANT

## 2023-11-28 PROCEDURE — 83036 HEMOGLOBIN GLYCOSYLATED A1C: CPT | Performed by: PHYSICIAN ASSISTANT

## 2023-11-28 PROCEDURE — 3044F HG A1C LEVEL LT 7.0%: CPT | Mod: CPTII,S$GLB,, | Performed by: PHYSICIAN ASSISTANT

## 2023-11-28 PROCEDURE — 1159F MED LIST DOCD IN RCRD: CPT | Mod: CPTII,S$GLB,, | Performed by: PHYSICIAN ASSISTANT

## 2023-11-28 PROCEDURE — 1101F PR PT FALLS ASSESS DOC 0-1 FALLS W/OUT INJ PAST YR: ICD-10-PCS | Mod: CPTII,S$GLB,, | Performed by: PHYSICIAN ASSISTANT

## 2023-11-28 RX ORDER — INDOMETHACIN 50 MG/1
50 CAPSULE ORAL 2 TIMES DAILY WITH MEALS
Qty: 15 CAPSULE | Refills: 0 | Status: SHIPPED | OUTPATIENT
Start: 2023-11-28 | End: 2024-02-12

## 2023-11-28 RX ORDER — PREGABALIN 100 MG/1
100 CAPSULE ORAL 2 TIMES DAILY
Qty: 60 CAPSULE | Refills: 5 | Status: SHIPPED | OUTPATIENT
Start: 2023-11-28 | End: 2023-12-04

## 2023-11-28 RX ORDER — HYDROCODONE BITARTRATE AND ACETAMINOPHEN 5; 325 MG/1; MG/1
1 TABLET ORAL EVERY 6 HOURS PRN
COMMUNITY
Start: 2023-11-25 | End: 2023-12-05
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
## 2023-12-01 ENCOUNTER — HOSPITAL ENCOUNTER (OUTPATIENT)
Facility: HOSPITAL | Age: 68
Discharge: HOME | End: 2023-12-01
Attending: ANESTHESIOLOGY | Admitting: ANESTHESIOLOGY
Payer: MEDICARE

## 2023-12-01 ENCOUNTER — TELEPHONE (OUTPATIENT)
Dept: PAIN MEDICINE | Facility: HOSPITAL | Age: 68
End: 2023-12-01
Payer: MEDICARE

## 2023-12-01 ENCOUNTER — HOSPITAL ENCOUNTER (OUTPATIENT)
Dept: RADIOLOGY | Facility: HOSPITAL | Age: 68
Discharge: HOME | End: 2023-12-01
Attending: ANESTHESIOLOGY | Admitting: ANESTHESIOLOGY
Payer: MEDICARE

## 2023-12-01 ENCOUNTER — TELEPHONE (OUTPATIENT)
Dept: SURGERY | Facility: HOSPITAL | Age: 68
End: 2023-12-01
Payer: MEDICARE

## 2023-12-01 VITALS
TEMPERATURE: 98 F | RESPIRATION RATE: 16 BRPM | OXYGEN SATURATION: 96 % | WEIGHT: 265 LBS | BODY MASS INDEX: 37.1 KG/M2 | HEIGHT: 71 IN | SYSTOLIC BLOOD PRESSURE: 108 MMHG | DIASTOLIC BLOOD PRESSURE: 63 MMHG | HEART RATE: 66 BPM

## 2023-12-01 DIAGNOSIS — M47.816 LUMBAR SPONDYLOSIS: ICD-10-CM

## 2023-12-01 DIAGNOSIS — M54.50 LOWER BACK PAIN: ICD-10-CM

## 2023-12-01 PROCEDURE — 63600175 PHARM REV CODE 636 W HCPCS: Mod: PO | Performed by: ANESTHESIOLOGY

## 2023-12-01 RX ORDER — SODIUM CHLORIDE, SODIUM LACTATE, POTASSIUM CHLORIDE, CALCIUM CHLORIDE 600; 310; 30; 20 MG/100ML; MG/100ML; MG/100ML; MG/100ML
INJECTION, SOLUTION INTRAVENOUS CONTINUOUS
Status: DISCONTINUED | OUTPATIENT
Start: 2023-12-01 | End: 2023-12-01 | Stop reason: HOSPADM

## 2023-12-01 RX ADMIN — SODIUM CHLORIDE, POTASSIUM CHLORIDE, SODIUM LACTATE AND CALCIUM CHLORIDE: 600; 310; 30; 20 INJECTION, SOLUTION INTRAVENOUS at 10:12

## 2023-12-01 NOTE — TELEPHONE ENCOUNTER
This pt was scheduled for a procedure today with Dr. Gutierrez, he was canceled in preop per Dr. Gutierrez d/t leg pain and will schedule for a different procedure. Please notify this pt to reschedule, thank you.

## 2023-12-01 NOTE — INTERVAL H&P NOTE
The patient has been examined and the H&P has been reviewed:    I concur with the findings and changes have been noted since the H&P was written: pain into his legs.  We will cancel and see him in the office.       There are no hospital problems to display for this patient.

## 2023-12-01 NOTE — TELEPHONE ENCOUNTER
Can you please make this patient an appointment to see me in the office next week.  Okay to double book on my schedule at the patient's convenience

## 2023-12-04 RX ORDER — MONTELUKAST SODIUM 10 MG/1
10 TABLET ORAL DAILY
Qty: 90 TABLET | Refills: 3 | Status: SHIPPED | OUTPATIENT
Start: 2023-12-04 | End: 2024-02-01 | Stop reason: SDUPTHER

## 2023-12-04 RX ORDER — ALLOPURINOL 300 MG/1
300 TABLET ORAL DAILY PRN
Qty: 90 TABLET | Refills: 3 | Status: SHIPPED | OUTPATIENT
Start: 2023-12-04 | End: 2024-02-01 | Stop reason: SDUPTHER

## 2023-12-04 RX ORDER — MONTELUKAST SODIUM 10 MG/1
TABLET ORAL
Qty: 90 TABLET | Refills: 3 | OUTPATIENT
Start: 2023-12-04

## 2023-12-04 RX ORDER — GABAPENTIN 300 MG/1
300 CAPSULE ORAL 3 TIMES DAILY
Qty: 90 CAPSULE | Refills: 11 | Status: SHIPPED | OUTPATIENT
Start: 2023-12-04 | End: 2024-02-12

## 2023-12-04 NOTE — TELEPHONE ENCOUNTER
Has he contacted Dr Francois on what is going on? And Pain where? He had pain in his leg and back at last visit?

## 2023-12-04 NOTE — TELEPHONE ENCOUNTER
----- Message from Heather Schaefer, Patient Care Assistant sent at 12/4/2023 11:55 AM CST -----  Contact: Gabi Ashley is calling to let the Passman know they will not go to the hospital and see Dr Gutierrez tomorrow. Please call [phone]  thanks

## 2023-12-04 NOTE — TELEPHONE ENCOUNTER
Gabi is calling to let the Passman know they will not go to the hospital and see Dr Gutierrez tomorrow. Please call [phone]   thanks

## 2023-12-04 NOTE — TELEPHONE ENCOUNTER
----- Message from Adrianna Stein sent at 12/4/2023  3:50 PM CST -----  Type:  Needs Medical Advice    Who Called: pt wife marcellus Oliver Call Back Number: [phone] (home)     Additional Information:  Requesting call back  sts rx pregabalin (LYRICA) 100 MG capsule has cause the pt to have hives in head... wants to dx     please advise thank you

## 2023-12-04 NOTE — TELEPHONE ENCOUNTER
Spoke with patients wife, stated pain is now become unbearable, patient is barely able to walk and can not sleep. He is up all night in pain. Have been to the ED 4 times. He had taken 5 pills of the lyrica and broke out into a rash. Please advise

## 2023-12-05 ENCOUNTER — OFFICE VISIT (OUTPATIENT)
Dept: PAIN MEDICINE | Facility: CLINIC | Age: 68
End: 2023-12-05
Payer: MEDICARE

## 2023-12-05 ENCOUNTER — LAB VISIT (OUTPATIENT)
Dept: LAB | Facility: HOSPITAL | Age: 68
End: 2023-12-05
Attending: ANESTHESIOLOGY
Payer: MEDICARE

## 2023-12-05 VITALS
WEIGHT: 253.06 LBS | DIASTOLIC BLOOD PRESSURE: 67 MMHG | HEIGHT: 71 IN | SYSTOLIC BLOOD PRESSURE: 107 MMHG | HEART RATE: 68 BPM | BODY MASS INDEX: 35.43 KG/M2

## 2023-12-05 DIAGNOSIS — M54.16 LUMBAR RADICULOPATHY: Primary | ICD-10-CM

## 2023-12-05 DIAGNOSIS — Z01.818 PREOP TESTING: ICD-10-CM

## 2023-12-05 DIAGNOSIS — M48.061 SPINAL STENOSIS OF LUMBAR REGION WITHOUT NEUROGENIC CLAUDICATION: ICD-10-CM

## 2023-12-05 LAB
ERYTHROCYTE [DISTWIDTH] IN BLOOD BY AUTOMATED COUNT: 12.3 % (ref 11.5–14.5)
HCT VFR BLD AUTO: 39.9 % (ref 40–54)
HGB BLD-MCNC: 13.5 G/DL (ref 14–18)
MCH RBC QN AUTO: 30.7 PG (ref 27–31)
MCHC RBC AUTO-ENTMCNC: 33.8 G/DL (ref 32–36)
MCV RBC AUTO: 91 FL (ref 82–98)
PLATELET # BLD AUTO: 331 K/UL (ref 150–450)
PMV BLD AUTO: 9.6 FL (ref 9.2–12.9)
RBC # BLD AUTO: 4.4 M/UL (ref 4.6–6.2)
WBC # BLD AUTO: 7.69 K/UL (ref 3.9–12.7)

## 2023-12-05 PROCEDURE — 3288F PR FALLS RISK ASSESSMENT DOCUMENTED: ICD-10-PCS | Mod: CPTII,S$GLB,, | Performed by: ANESTHESIOLOGY

## 2023-12-05 PROCEDURE — 1125F PR PAIN SEVERITY QUANTIFIED, PAIN PRESENT: ICD-10-PCS | Mod: CPTII,S$GLB,, | Performed by: ANESTHESIOLOGY

## 2023-12-05 PROCEDURE — 4010F ACE/ARB THERAPY RXD/TAKEN: CPT | Mod: CPTII,S$GLB,, | Performed by: ANESTHESIOLOGY

## 2023-12-05 PROCEDURE — 99214 OFFICE O/P EST MOD 30 MIN: CPT | Mod: S$GLB,,, | Performed by: ANESTHESIOLOGY

## 2023-12-05 PROCEDURE — 3288F FALL RISK ASSESSMENT DOCD: CPT | Mod: CPTII,S$GLB,, | Performed by: ANESTHESIOLOGY

## 2023-12-05 PROCEDURE — 3078F PR MOST RECENT DIASTOLIC BLOOD PRESSURE < 80 MM HG: ICD-10-PCS | Mod: CPTII,S$GLB,, | Performed by: ANESTHESIOLOGY

## 2023-12-05 PROCEDURE — 3008F PR BODY MASS INDEX (BMI) DOCUMENTED: ICD-10-PCS | Mod: CPTII,S$GLB,, | Performed by: ANESTHESIOLOGY

## 2023-12-05 PROCEDURE — 99999 PR PBB SHADOW E&M-EST. PATIENT-LVL III: CPT | Mod: PBBFAC,,, | Performed by: ANESTHESIOLOGY

## 2023-12-05 PROCEDURE — 3066F NEPHROPATHY DOC TX: CPT | Mod: CPTII,S$GLB,, | Performed by: ANESTHESIOLOGY

## 2023-12-05 PROCEDURE — 3074F PR MOST RECENT SYSTOLIC BLOOD PRESSURE < 130 MM HG: ICD-10-PCS | Mod: CPTII,S$GLB,, | Performed by: ANESTHESIOLOGY

## 2023-12-05 PROCEDURE — 1101F PR PT FALLS ASSESS DOC 0-1 FALLS W/OUT INJ PAST YR: ICD-10-PCS | Mod: CPTII,S$GLB,, | Performed by: ANESTHESIOLOGY

## 2023-12-05 PROCEDURE — 3061F NEG MICROALBUMINURIA REV: CPT | Mod: CPTII,S$GLB,, | Performed by: ANESTHESIOLOGY

## 2023-12-05 PROCEDURE — 3066F PR DOCUMENTATION OF TREATMENT FOR NEPHROPATHY: ICD-10-PCS | Mod: CPTII,S$GLB,, | Performed by: ANESTHESIOLOGY

## 2023-12-05 PROCEDURE — 3078F DIAST BP <80 MM HG: CPT | Mod: CPTII,S$GLB,, | Performed by: ANESTHESIOLOGY

## 2023-12-05 PROCEDURE — 3074F SYST BP LT 130 MM HG: CPT | Mod: CPTII,S$GLB,, | Performed by: ANESTHESIOLOGY

## 2023-12-05 PROCEDURE — 3044F PR MOST RECENT HEMOGLOBIN A1C LEVEL <7.0%: ICD-10-PCS | Mod: CPTII,S$GLB,, | Performed by: ANESTHESIOLOGY

## 2023-12-05 PROCEDURE — 85027 COMPLETE CBC AUTOMATED: CPT | Performed by: ANESTHESIOLOGY

## 2023-12-05 PROCEDURE — 99214 PR OFFICE/OUTPT VISIT, EST, LEVL IV, 30-39 MIN: ICD-10-PCS | Mod: S$GLB,,, | Performed by: ANESTHESIOLOGY

## 2023-12-05 PROCEDURE — 1101F PT FALLS ASSESS-DOCD LE1/YR: CPT | Mod: CPTII,S$GLB,, | Performed by: ANESTHESIOLOGY

## 2023-12-05 PROCEDURE — 1160F PR REVIEW ALL MEDS BY PRESCRIBER/CLIN PHARMACIST DOCUMENTED: ICD-10-PCS | Mod: CPTII,S$GLB,, | Performed by: ANESTHESIOLOGY

## 2023-12-05 PROCEDURE — 3008F BODY MASS INDEX DOCD: CPT | Mod: CPTII,S$GLB,, | Performed by: ANESTHESIOLOGY

## 2023-12-05 PROCEDURE — 1160F RVW MEDS BY RX/DR IN RCRD: CPT | Mod: CPTII,S$GLB,, | Performed by: ANESTHESIOLOGY

## 2023-12-05 PROCEDURE — 1125F AMNT PAIN NOTED PAIN PRSNT: CPT | Mod: CPTII,S$GLB,, | Performed by: ANESTHESIOLOGY

## 2023-12-05 PROCEDURE — 1159F MED LIST DOCD IN RCRD: CPT | Mod: CPTII,S$GLB,, | Performed by: ANESTHESIOLOGY

## 2023-12-05 PROCEDURE — 4010F PR ACE/ARB THEARPY RXD/TAKEN: ICD-10-PCS | Mod: CPTII,S$GLB,, | Performed by: ANESTHESIOLOGY

## 2023-12-05 PROCEDURE — 36415 COLL VENOUS BLD VENIPUNCTURE: CPT | Mod: PO | Performed by: ANESTHESIOLOGY

## 2023-12-05 PROCEDURE — 3061F PR NEG MICROALBUMINURIA RESULT DOCUMENTED/REVIEW: ICD-10-PCS | Mod: CPTII,S$GLB,, | Performed by: ANESTHESIOLOGY

## 2023-12-05 PROCEDURE — 99999 PR PBB SHADOW E&M-EST. PATIENT-LVL III: ICD-10-PCS | Mod: PBBFAC,,, | Performed by: ANESTHESIOLOGY

## 2023-12-05 PROCEDURE — 1159F PR MEDICATION LIST DOCUMENTED IN MEDICAL RECORD: ICD-10-PCS | Mod: CPTII,S$GLB,, | Performed by: ANESTHESIOLOGY

## 2023-12-05 PROCEDURE — 3044F HG A1C LEVEL LT 7.0%: CPT | Mod: CPTII,S$GLB,, | Performed by: ANESTHESIOLOGY

## 2023-12-05 RX ORDER — HYDROCODONE BITARTRATE AND ACETAMINOPHEN 5; 325 MG/1; MG/1
1 TABLET ORAL EVERY 12 HOURS PRN
Qty: 30 TABLET | Refills: 0 | Status: SHIPPED | OUTPATIENT
Start: 2023-12-05 | End: 2024-02-12

## 2023-12-05 NOTE — H&P (VIEW-ONLY)
[facility] Follow Up Evaluation      Referred by: No ref. provider found    PCP:     CC:   Chief Complaint   Patient presents with    Low-back Pain    Leg Pain     Feet pain          12/5/2023    11:08 AM 11/14/2023     9:22 AM 9/28/2023     3:18 PM   Last 3 PDI Scores   Pain Disability Index (PDI) 42 35 40         Interval HPI 12/5/2023: Asim Wilson returns to the office for follow up.  Last saw him in the office he was having primarily axial lower back pain and he was scheduled for diagnostic lumbar medial branch blocks however the day of the procedure he was reporting bilateral radicular pain.  He is with his wife today.  Today they report that he is having lower back pain that is radiating down both of his legs to the bottom of his feet.  The pain is sharp, shooting, burning.  He can feel intermittent numbness in his feet.  No marco antonio weakness.      HPI:   Asim Wilson is a 68 y.o. male patient who has a past medical history of Arthritis, Asthma, C. difficile colitis, Cataract, Colon polyp, Diabetic retinopathy, Gout attack, Hypertension, GERONIMO (obstructive sleep apnea), and Thyroid disease. He presents with back pain.  He has been having lower back pain for several years and has been gradually worsening.  Today his pain is 5/10 however at times it can get up to 10/10.  He reports his pain is intermittent, aching, sharp, shooting.  He is not having any radicular pain at this time.  Pain is worse with sitting, bending, walking, coughing, lifting.  He gets some relief with gout medications such as Indocin.      Pain Intervention History:      Past Spine Surgical History:      Past and current medications:  Antineuropathics:  NSAIDs: indomethacin   Physical therapy: no, currently too painful  Antidepressants:  Muscle relaxers:  Robaxin  Opioids: hydrocodone   Antiplatelets/Anticoagulants: aspirin     History:    Current Outpatient Medications:     allopurinoL (ZYLOPRIM) 300 MG tablet, TAKE 1 TABLET  (300 MG TOTAL) BY MOUTH DAILY AS NEEDED., Disp: 90 tablet, Rfl: 3    aspirin (ECOTRIN) 81 MG EC tablet, Take 1 tablet (81 mg total) by mouth once daily., Disp: 90 tablet, Rfl: 0    colchicine, gout, (COLCRYS) 0.6 mg tablet, Take 1 tablet (0.6 mg total) by mouth 2 (two) times daily as needed (acute gout). (Patient not taking: Reported on 11/15/2023), Disp: 60 tablet, Rfl: 11    furosemide (LASIX) 20 MG tablet, Take 1 tablet (20 mg total) by mouth once daily. Do not take the pm dose after 3 pm, Disp: 30 tablet, Rfl: 0    gabapentin (NEURONTIN) 300 MG capsule, Take 1 capsule (300 mg total) by mouth 3 (three) times daily., Disp: 90 capsule, Rfl: 11    HYDROcodone-acetaminophen (NORCO) 5-325 mg per tablet, Take 1 tablet by mouth every 12 (twelve) hours as needed for Pain., Disp: 30 tablet, Rfl: 0    indomethacin (INDOCIN) 50 MG capsule, Take 1 capsule (50 mg total) by mouth 2 (two) times daily with meals., Disp: 15 capsule, Rfl: 0    levocetirizine (XYZAL) 5 MG tablet, TAKE 1 TABLET BY MOUTH EVERY DAY IN THE EVENING, Disp: 90 tablet, Rfl: 3    levothyroxine (SYNTHROID) 137 MCG Tab tablet, Take 1 tablet (137 mcg total) by mouth before breakfast., Disp: 90 tablet, Rfl: 3    methocarbamoL (ROBAXIN) 500 MG Tab, Take 1 tablet (500 mg total) by mouth 2 (two) times daily as needed (muscle spasms)., Disp: 20 tablet, Rfl: 0    montelukast (SINGULAIR) 10 mg tablet, Take 1 tablet (10 mg total) by mouth once daily., Disp: 90 tablet, Rfl: 3    olmesartan (BENICAR) 20 MG tablet, Take 1 tablet (20 mg total) by mouth once daily., Disp: 90 tablet, Rfl: 3    rosuvastatin (CRESTOR) 10 MG tablet, TAKE 1 TABLET BY MOUTH EVERY DAY, Disp: 90 tablet, Rfl: 3    Past Medical History:   Diagnosis Date    Arthritis     Asthma     C. difficile colitis 07/01/2023    in care everywhere    Cataract     Done OU    Colon polyp     Diabetic retinopathy     Gout attack     Hypertension     GERONIMO (obstructive sleep apnea)     noncompliant CPAP    Thyroid  disease        Past Surgical History:   Procedure Laterality Date    CATARACT EXTRACTION W/  INTRAOCULAR LENS IMPLANT Right 06/13/2019    Dr Moses    CATARACT EXTRACTION W/  INTRAOCULAR LENS IMPLANT Left 07/18/2019    Dr Moses    COLONOSCOPY N/A 10/18/2022    Procedure: COLONOSCOPY;  Surgeon: Rahat Jarrell MD;  Location: [facility] ENDO;  Service: Endoscopy;  Laterality: N/A; Repeat colonoscopy in 3 years for surveillance    CORONARY ANGIOGRAPHY N/A 12/10/2020    Procedure: ANGIOGRAM, CORONARY ARTERY;  Surgeon: Roseanna Barr MD;  Location: [facility] CATH;  Service: Cardiology;  Laterality: N/A;    Cyst removed      From back of neck    cyst removed      from back    LEFT HEART CATHETERIZATION Left 12/10/2020    Procedure: Left heart cath;  Surgeon: Roseanna Barr MD;  Location: [facility] CATH;  Service: Cardiology;  Laterality: Left;    PHACOEMULSIFICATION OF CATARACT Right 06/13/2019    Procedure: PHACOEMULSIFICATION, CATARACT;  Surgeon: Gabe Moses Jr., MD;  Location: [facility] OR;  Service: Ophthalmology;  Laterality: Right;  Right    PHACOEMULSIFICATION OF CATARACT Left 07/18/2019    Procedure: PHACOEMULSIFICATION, CATARACT;  Surgeon: Gabe Moses Jr., MD;  Location: [facility] OR;  Service: Ophthalmology;  Laterality: Left;  Left       Family History   Problem Relation Age of Onset    Cancer Mother     Alzheimer's disease Father     No Known Problems Sister     No Known Problems Sister     No Known Problems Sister     No Known Problems Maternal Grandmother     No Known Problems Maternal Grandfather     No Known Problems Paternal Grandmother     No Known Problems Paternal Grandfather     Amblyopia Neg Hx     Blindness Neg Hx     Cataracts Neg Hx     Diabetes Neg Hx     Hypertension Neg Hx     Macular degeneration Neg Hx     Glaucoma Neg Hx     Retinal detachment Neg Hx     Strabismus Neg Hx     Stroke Neg Hx     Thyroid disease Neg Hx     Colon cancer Neg Hx     Crohn's disease Neg Hx     Esophageal cancer Neg Hx      "Stomach cancer Neg Hx     Ulcerative colitis Neg Hx        Social History     Socioeconomic History    Marital status:    Tobacco Use    Smoking status: Former    Smokeless tobacco: Never    Tobacco comments:     Quit 40 yrs ago   Substance and Sexual Activity    Alcohol use: Not Currently     Comment: rarely    Drug use: Never       Review of patient's allergies indicates:   Allergen Reactions    Cleocin [clindamycin hcl] Other (See Comments)     ARF    Pregabalin        Review of Systems:  12 point review of systems is negative.    Physical Exam:  Vitals:    12/05/23 1112   BP: 107/67   Pulse: 68   Weight: 114.8 kg (253 lb 1.4 oz)   Height: 5' 11" (1.803 m)   PainSc:   7   PainLoc: Back       Body mass index is 35.3 kg/m².    Gen: NAD  Psych: mood appropriate for given condition  HEENT: eyes anicteric   CV: RRR  HEENT: anicteric   Respiratory: non-labored, no signs of respiratory distress  Abd: non-distended  Skin: warm, dry and intact.  Gait: antalgic gait.       Sensory:  Intact and symmetrical to light touch in L1-S1 dermatomes bilaterally.    Motor:     Right Left   L2/3 Iliacus Hip flexion  5  5   L3/4 Qudratus Femoris Knee Extension  5  5   L4/5 Tib Anterior Ankle Dorsiflexion   5  5   L5/S1 Extensor Hallicus Longus Great toe extension  5  5   S1/S2 Gastroc/Soleus Plantar Flexion  5  5      Right Left                  Patellar DTR 0 0   Achilles DTR 0 0                      Labs:  Lab Results   Component Value Date    HGBA1C 5.7 (H) 11/28/2023       Lab Results   Component Value Date    WBC 16.68 (H) 10/01/2023    HGB 12.9 (L) 10/01/2023    HCT 38.3 (L) 10/01/2023    MCV 97 10/01/2023     10/01/2023       Imaging:  X-ray lumbar spine with flexion-extension 09/28/2023  FINDINGS:  Mild rotatory levoconvex curvature of the lumbar spine.  Mild grade 1 anterolisthesis of L4 on L5.  No abnormal translation with flexion or extension.  Vertebral body heights are maintained without evidence of fracture " or osseous destructive process.  Multilevel mild-to-moderate disc degeneration with intervertebral disc space narrowing, degenerative endplate change and marginal osteophyte formation, most pronounced at L2-3 through L4-5.  Lower lumbar facet arthropathy.  Aortoiliac calcific atherosclerosis.    MRI lumbar spine 11/10/2023  FINDINGS:  Vertebral column: Comparison plain films demonstrated a broad levocurvature of the lumbar spine.  There is suggestion of mild anterolisthesis of L4 on L5.  This is not clearly demonstrated on MRI but there is trace retrolisthesis of L2 on L3 which is exaggerated by osteophyte formation.  There is marked disc space narrowing at the T10-11 and T11-12 levels with mild-to-moderate disc space narrowing at the T12-L1 through L2-3 levels.  There is multilevel endplate osteophyte formation.  The discs are desiccated.  Baseline marrow signal is normal.     Spinal canal, conus, epidural space: The spinal canal is small on a developmental basis.  The conus terminates at the level of T12 and is grossly normal in signal intensity.  There is no abnormal epidural mass or fluid collection.     Findings by level:     On the sagittal images, in addition to marked disc space narrowing at both the T10-11 and T11-12 levels, there are shallow disc protrusions in addition a ligamentum flavum thickening.  There is moderate spinal stenosis and some degree of bilateral foraminal stenosis without acute cord compression.     T12-L1: There is disc space narrowing, diffuse disc bulge with osteophytic ridging in addition to facet joint arthropathy.  There is only borderline to mild spinal stenosis.  There is however mild-to-moderate bilateral, left greater than right, foraminal stenosis.  L1-2: There is disc space narrowing, diffuse disc bulge with osteophytic ridging and facet joint arthropathy.  There is mild spinal stenosis with crowding of the left lateral recess.  There is mild-to-moderate left but only mild  right foraminal stenosis.  L2-3: There is disc space narrowing, trace retrolisthesis of L2 on L3.  There is a diffuse disc bulge with osteophytic ridging eccentric to the right.  There is right greater than left facet joint arthropathy.  There is moderate spinal stenosis with crowding of the right lateral recess as well as severe right and moderate to severe left foraminal stenosis.  L3-4: There is a diffuse disc bulge with osteophytic ridging in addition to bilateral facet joint arthropathy resulting in moderate spinal stenosis, severe crowding of the lateral recess bilaterally as well as severe bilateral foraminal stenosis.  L4-5: There is a diffuse disc bulge with superimposed shallow broad central disc protrusion.  There is marked facet joint arthropathy with ligamentum flavum thickening.  There is severe spinal canal, bilateral lateral recess and foraminal stenosis.  L5-S1: There is left greater than right facet joint arthropathy.  There is no spinal stenosis.  There is moderate to marked left and only mild right foraminal stenosis.     Soft tissues, other: The posterior spinous muscles are mildly atrophic.  The prevertebral soft tissues appear normal.  The aorta is normal in caliber.  There is no hydronephrosis but there is a right renal cyst measuring approximately 10 mm.  This is present on concurrent CT abdomen and pelvis.      Assessment:   Problem List Items Addressed This Visit    None  Visit Diagnoses       Lumbar radiculopathy    -  Primary    Relevant Medications    HYDROcodone-acetaminophen (NORCO) 5-325 mg per tablet    Preop testing        Relevant Orders    CBC Without Differential    Spinal stenosis of lumbar region without neurogenic claudication                  Asim Wilson is a 68 y.o. male patient who has a past medical history of Arthritis, Asthma, C. difficile colitis, Cataract, Colon polyp, Diabetic retinopathy, Gout attack, Hypertension, GERONIMO (obstructive sleep apnea), and Thyroid  disease. He presents with back pain.  He has been having lower back pain for several years and has been gradually worsening.  Today his pain is 5/10 however at times it can get up to 10/10.  He reports his pain is intermittent, aching, sharp, shooting.  He is not having any radicular pain at this time.  Pain is worse with sitting, bending, walking, coughing, lifting.  He gets some relief with gout medications such as Indocin.      12/5/23 - Asim Wilson returns to the office for follow up.  Last saw him in the office he was having primarily axial lower back pain and he was scheduled for diagnostic lumbar medial branch blocks however the day of the procedure he was reporting bilateral radicular pain.  He is with his wife today.  Today they report that he is having lower back pain that is radiating down both of his legs to the bottom of his feet.  The pain is sharp, shooting, burning.  He can feel intermittent numbness in his feet.  No marco antonio weakness.    - on exam he has full strength of his lower extremities and intact sensation to light touch bilateral L2-S1  - I independently reviewed again his lumbar MRI with the patient and his wife and he has moderate central canal narrowing at L3-4 and severe central canal narrowing at L4-5.  He has multilevel bilateral facet arthropathy  - I think that his current pain is related to his severe central canal narrowing at L4-5.  He describes radicular pain and symptoms of claudication.    - his pain is currently too severe to participate in formal physical therapy.  He is not having any relief with Robaxin.  He avoids NSAIDs due to renal dysfunction.  I have prescribed him some hydrocodone to use 1 to 2 times a day as needed for severe pain  - his pain is limiting his mobility and interfering with his quality of life  - we will schedule for an L5-S1 ARACELI.  The risks and benefits of this intervention, and alternative therapies were discussed with the patient.  The discussion  of risks included infection, bleeding, need for additional procedures or surgery, nerve damage.  Questions regarding the procedure, risks, expected outcome, and possible side effects were solicited and answered to the patient's satisfaction.  Asim Katie Wilson wishes to proceed with the injection or procedure.  Written consent was obtained.  - follow up 2-3 weeks post injection      : Not applicable      This note was completed with dictation software and grammatical errors may exist.

## 2023-12-05 NOTE — PROGRESS NOTES
[facility] Follow Up Evaluation      Referred by: No ref. provider found    PCP:     CC:   Chief Complaint   Patient presents with    Low-back Pain    Leg Pain     Feet pain          12/5/2023    11:08 AM 11/14/2023     9:22 AM 9/28/2023     3:18 PM   Last 3 PDI Scores   Pain Disability Index (PDI) 42 35 40         Interval HPI 12/5/2023: Asim Wilson returns to the office for follow up.  Last saw him in the office he was having primarily axial lower back pain and he was scheduled for diagnostic lumbar medial branch blocks however the day of the procedure he was reporting bilateral radicular pain.  He is with his wife today.  Today they report that he is having lower back pain that is radiating down both of his legs to the bottom of his feet.  The pain is sharp, shooting, burning.  He can feel intermittent numbness in his feet.  No marco antonio weakness.      HPI:   Asim Wilson is a 68 y.o. male patient who has a past medical history of Arthritis, Asthma, C. difficile colitis, Cataract, Colon polyp, Diabetic retinopathy, Gout attack, Hypertension, GERONIMO (obstructive sleep apnea), and Thyroid disease. He presents with back pain.  He has been having lower back pain for several years and has been gradually worsening.  Today his pain is 5/10 however at times it can get up to 10/10.  He reports his pain is intermittent, aching, sharp, shooting.  He is not having any radicular pain at this time.  Pain is worse with sitting, bending, walking, coughing, lifting.  He gets some relief with gout medications such as Indocin.      Pain Intervention History:      Past Spine Surgical History:      Past and current medications:  Antineuropathics:  NSAIDs: indomethacin   Physical therapy: no, currently too painful  Antidepressants:  Muscle relaxers:  Robaxin  Opioids: hydrocodone   Antiplatelets/Anticoagulants: aspirin     History:    Current Outpatient Medications:     allopurinoL (ZYLOPRIM) 300 MG tablet, TAKE 1 TABLET  (300 MG TOTAL) BY MOUTH DAILY AS NEEDED., Disp: 90 tablet, Rfl: 3    aspirin (ECOTRIN) 81 MG EC tablet, Take 1 tablet (81 mg total) by mouth once daily., Disp: 90 tablet, Rfl: 0    colchicine, gout, (COLCRYS) 0.6 mg tablet, Take 1 tablet (0.6 mg total) by mouth 2 (two) times daily as needed (acute gout). (Patient not taking: Reported on 11/15/2023), Disp: 60 tablet, Rfl: 11    furosemide (LASIX) 20 MG tablet, Take 1 tablet (20 mg total) by mouth once daily. Do not take the pm dose after 3 pm, Disp: 30 tablet, Rfl: 0    gabapentin (NEURONTIN) 300 MG capsule, Take 1 capsule (300 mg total) by mouth 3 (three) times daily., Disp: 90 capsule, Rfl: 11    HYDROcodone-acetaminophen (NORCO) 5-325 mg per tablet, Take 1 tablet by mouth every 12 (twelve) hours as needed for Pain., Disp: 30 tablet, Rfl: 0    indomethacin (INDOCIN) 50 MG capsule, Take 1 capsule (50 mg total) by mouth 2 (two) times daily with meals., Disp: 15 capsule, Rfl: 0    levocetirizine (XYZAL) 5 MG tablet, TAKE 1 TABLET BY MOUTH EVERY DAY IN THE EVENING, Disp: 90 tablet, Rfl: 3    levothyroxine (SYNTHROID) 137 MCG Tab tablet, Take 1 tablet (137 mcg total) by mouth before breakfast., Disp: 90 tablet, Rfl: 3    methocarbamoL (ROBAXIN) 500 MG Tab, Take 1 tablet (500 mg total) by mouth 2 (two) times daily as needed (muscle spasms)., Disp: 20 tablet, Rfl: 0    montelukast (SINGULAIR) 10 mg tablet, Take 1 tablet (10 mg total) by mouth once daily., Disp: 90 tablet, Rfl: 3    olmesartan (BENICAR) 20 MG tablet, Take 1 tablet (20 mg total) by mouth once daily., Disp: 90 tablet, Rfl: 3    rosuvastatin (CRESTOR) 10 MG tablet, TAKE 1 TABLET BY MOUTH EVERY DAY, Disp: 90 tablet, Rfl: 3    Past Medical History:   Diagnosis Date    Arthritis     Asthma     C. difficile colitis 07/01/2023    in care everywhere    Cataract     Done OU    Colon polyp     Diabetic retinopathy     Gout attack     Hypertension     GERONIMO (obstructive sleep apnea)     noncompliant CPAP    Thyroid  disease        Past Surgical History:   Procedure Laterality Date    CATARACT EXTRACTION W/  INTRAOCULAR LENS IMPLANT Right 06/13/2019    Dr Moses    CATARACT EXTRACTION W/  INTRAOCULAR LENS IMPLANT Left 07/18/2019    Dr Moses    COLONOSCOPY N/A 10/18/2022    Procedure: COLONOSCOPY;  Surgeon: Rahat Jarrell MD;  Location: [facility] ENDO;  Service: Endoscopy;  Laterality: N/A; Repeat colonoscopy in 3 years for surveillance    CORONARY ANGIOGRAPHY N/A 12/10/2020    Procedure: ANGIOGRAM, CORONARY ARTERY;  Surgeon: Roseanna Barr MD;  Location: [facility] CATH;  Service: Cardiology;  Laterality: N/A;    Cyst removed      From back of neck    cyst removed      from back    LEFT HEART CATHETERIZATION Left 12/10/2020    Procedure: Left heart cath;  Surgeon: Roseanna Barr MD;  Location: [facility] CATH;  Service: Cardiology;  Laterality: Left;    PHACOEMULSIFICATION OF CATARACT Right 06/13/2019    Procedure: PHACOEMULSIFICATION, CATARACT;  Surgeon: Gabe Moses Jr., MD;  Location: [facility] OR;  Service: Ophthalmology;  Laterality: Right;  Right    PHACOEMULSIFICATION OF CATARACT Left 07/18/2019    Procedure: PHACOEMULSIFICATION, CATARACT;  Surgeon: Gabe Moses Jr., MD;  Location: [facility] OR;  Service: Ophthalmology;  Laterality: Left;  Left       Family History   Problem Relation Age of Onset    Cancer Mother     Alzheimer's disease Father     No Known Problems Sister     No Known Problems Sister     No Known Problems Sister     No Known Problems Maternal Grandmother     No Known Problems Maternal Grandfather     No Known Problems Paternal Grandmother     No Known Problems Paternal Grandfather     Amblyopia Neg Hx     Blindness Neg Hx     Cataracts Neg Hx     Diabetes Neg Hx     Hypertension Neg Hx     Macular degeneration Neg Hx     Glaucoma Neg Hx     Retinal detachment Neg Hx     Strabismus Neg Hx     Stroke Neg Hx     Thyroid disease Neg Hx     Colon cancer Neg Hx     Crohn's disease Neg Hx     Esophageal cancer Neg Hx      "Stomach cancer Neg Hx     Ulcerative colitis Neg Hx        Social History     Socioeconomic History    Marital status:    Tobacco Use    Smoking status: Former    Smokeless tobacco: Never    Tobacco comments:     Quit 40 yrs ago   Substance and Sexual Activity    Alcohol use: Not Currently     Comment: rarely    Drug use: Never       Review of patient's allergies indicates:   Allergen Reactions    Cleocin [clindamycin hcl] Other (See Comments)     ARF    Pregabalin        Review of Systems:  12 point review of systems is negative.    Physical Exam:  Vitals:    12/05/23 1112   BP: 107/67   Pulse: 68   Weight: 114.8 kg (253 lb 1.4 oz)   Height: 5' 11" (1.803 m)   PainSc:   7   PainLoc: Back       Body mass index is 35.3 kg/m².    Gen: NAD  Psych: mood appropriate for given condition  HEENT: eyes anicteric   CV: RRR  HEENT: anicteric   Respiratory: non-labored, no signs of respiratory distress  Abd: non-distended  Skin: warm, dry and intact.  Gait: antalgic gait.       Sensory:  Intact and symmetrical to light touch in L1-S1 dermatomes bilaterally.    Motor:     Right Left   L2/3 Iliacus Hip flexion  5  5   L3/4 Qudratus Femoris Knee Extension  5  5   L4/5 Tib Anterior Ankle Dorsiflexion   5  5   L5/S1 Extensor Hallicus Longus Great toe extension  5  5   S1/S2 Gastroc/Soleus Plantar Flexion  5  5      Right Left                  Patellar DTR 0 0   Achilles DTR 0 0                      Labs:  Lab Results   Component Value Date    HGBA1C 5.7 (H) 11/28/2023       Lab Results   Component Value Date    WBC 16.68 (H) 10/01/2023    HGB 12.9 (L) 10/01/2023    HCT 38.3 (L) 10/01/2023    MCV 97 10/01/2023     10/01/2023       Imaging:  X-ray lumbar spine with flexion-extension 09/28/2023  FINDINGS:  Mild rotatory levoconvex curvature of the lumbar spine.  Mild grade 1 anterolisthesis of L4 on L5.  No abnormal translation with flexion or extension.  Vertebral body heights are maintained without evidence of fracture " or osseous destructive process.  Multilevel mild-to-moderate disc degeneration with intervertebral disc space narrowing, degenerative endplate change and marginal osteophyte formation, most pronounced at L2-3 through L4-5.  Lower lumbar facet arthropathy.  Aortoiliac calcific atherosclerosis.    MRI lumbar spine 11/10/2023  FINDINGS:  Vertebral column: Comparison plain films demonstrated a broad levocurvature of the lumbar spine.  There is suggestion of mild anterolisthesis of L4 on L5.  This is not clearly demonstrated on MRI but there is trace retrolisthesis of L2 on L3 which is exaggerated by osteophyte formation.  There is marked disc space narrowing at the T10-11 and T11-12 levels with mild-to-moderate disc space narrowing at the T12-L1 through L2-3 levels.  There is multilevel endplate osteophyte formation.  The discs are desiccated.  Baseline marrow signal is normal.     Spinal canal, conus, epidural space: The spinal canal is small on a developmental basis.  The conus terminates at the level of T12 and is grossly normal in signal intensity.  There is no abnormal epidural mass or fluid collection.     Findings by level:     On the sagittal images, in addition to marked disc space narrowing at both the T10-11 and T11-12 levels, there are shallow disc protrusions in addition a ligamentum flavum thickening.  There is moderate spinal stenosis and some degree of bilateral foraminal stenosis without acute cord compression.     T12-L1: There is disc space narrowing, diffuse disc bulge with osteophytic ridging in addition to facet joint arthropathy.  There is only borderline to mild spinal stenosis.  There is however mild-to-moderate bilateral, left greater than right, foraminal stenosis.  L1-2: There is disc space narrowing, diffuse disc bulge with osteophytic ridging and facet joint arthropathy.  There is mild spinal stenosis with crowding of the left lateral recess.  There is mild-to-moderate left but only mild  right foraminal stenosis.  L2-3: There is disc space narrowing, trace retrolisthesis of L2 on L3.  There is a diffuse disc bulge with osteophytic ridging eccentric to the right.  There is right greater than left facet joint arthropathy.  There is moderate spinal stenosis with crowding of the right lateral recess as well as severe right and moderate to severe left foraminal stenosis.  L3-4: There is a diffuse disc bulge with osteophytic ridging in addition to bilateral facet joint arthropathy resulting in moderate spinal stenosis, severe crowding of the lateral recess bilaterally as well as severe bilateral foraminal stenosis.  L4-5: There is a diffuse disc bulge with superimposed shallow broad central disc protrusion.  There is marked facet joint arthropathy with ligamentum flavum thickening.  There is severe spinal canal, bilateral lateral recess and foraminal stenosis.  L5-S1: There is left greater than right facet joint arthropathy.  There is no spinal stenosis.  There is moderate to marked left and only mild right foraminal stenosis.     Soft tissues, other: The posterior spinous muscles are mildly atrophic.  The prevertebral soft tissues appear normal.  The aorta is normal in caliber.  There is no hydronephrosis but there is a right renal cyst measuring approximately 10 mm.  This is present on concurrent CT abdomen and pelvis.      Assessment:   Problem List Items Addressed This Visit    None  Visit Diagnoses       Lumbar radiculopathy    -  Primary    Relevant Medications    HYDROcodone-acetaminophen (NORCO) 5-325 mg per tablet    Preop testing        Relevant Orders    CBC Without Differential    Spinal stenosis of lumbar region without neurogenic claudication                  Asim Wilson is a 68 y.o. male patient who has a past medical history of Arthritis, Asthma, C. difficile colitis, Cataract, Colon polyp, Diabetic retinopathy, Gout attack, Hypertension, GERONIMO (obstructive sleep apnea), and Thyroid  disease. He presents with back pain.  He has been having lower back pain for several years and has been gradually worsening.  Today his pain is 5/10 however at times it can get up to 10/10.  He reports his pain is intermittent, aching, sharp, shooting.  He is not having any radicular pain at this time.  Pain is worse with sitting, bending, walking, coughing, lifting.  He gets some relief with gout medications such as Indocin.      12/5/23 - Asim Wilson returns to the office for follow up.  Last saw him in the office he was having primarily axial lower back pain and he was scheduled for diagnostic lumbar medial branch blocks however the day of the procedure he was reporting bilateral radicular pain.  He is with his wife today.  Today they report that he is having lower back pain that is radiating down both of his legs to the bottom of his feet.  The pain is sharp, shooting, burning.  He can feel intermittent numbness in his feet.  No marco antonio weakness.    - on exam he has full strength of his lower extremities and intact sensation to light touch bilateral L2-S1  - I independently reviewed again his lumbar MRI with the patient and his wife and he has moderate central canal narrowing at L3-4 and severe central canal narrowing at L4-5.  He has multilevel bilateral facet arthropathy  - I think that his current pain is related to his severe central canal narrowing at L4-5.  He describes radicular pain and symptoms of claudication.    - his pain is currently too severe to participate in formal physical therapy.  He is not having any relief with Robaxin.  He avoids NSAIDs due to renal dysfunction.  I have prescribed him some hydrocodone to use 1 to 2 times a day as needed for severe pain  - his pain is limiting his mobility and interfering with his quality of life  - we will schedule for an L5-S1 ARACELI.  The risks and benefits of this intervention, and alternative therapies were discussed with the patient.  The discussion  of risks included infection, bleeding, need for additional procedures or surgery, nerve damage.  Questions regarding the procedure, risks, expected outcome, and possible side effects were solicited and answered to the patient's satisfaction.  Asim Katie Wilson wishes to proceed with the injection or procedure.  Written consent was obtained.  - follow up 2-3 weeks post injection      : Not applicable      This note was completed with dictation software and grammatical errors may exist.

## 2023-12-06 ENCOUNTER — TELEPHONE (OUTPATIENT)
Dept: PAIN MEDICINE | Facility: CLINIC | Age: 68
End: 2023-12-06
Payer: MEDICARE

## 2023-12-06 DIAGNOSIS — M54.16 LUMBAR RADICULOPATHY: Primary | ICD-10-CM

## 2023-12-06 RX ORDER — ALPRAZOLAM 0.5 MG/1
1 TABLET, ORALLY DISINTEGRATING ORAL ONCE AS NEEDED
Status: CANCELLED | OUTPATIENT
Start: 2023-12-06 | End: 2035-05-04

## 2023-12-06 NOTE — TELEPHONE ENCOUNTER
I spoke to the patient and his wife.  He continues to have pain down his legs in his feet.  Reports Indocin gives the most relief.      I discussed that I would recommend reaching out to PCP if this may be a gout related issue.      Discussed intractable pain needs to go to the ER.      They also reported he had an ultrasound of his bilateral lower extremities 10 days ago and that was negative for DVT.

## 2023-12-06 NOTE — TELEPHONE ENCOUNTER
Called and spoke with patient's wife to schedule. Patient schedule for lumbar ARACELI on 12/14 with Dr. Gutierrez. Per provider patient is to hold ASA x 7 days. Patient's wife verbalized understanding. Message for clearance sent to patient's PCP. Pre op information given and follow up appointment scheduled..

## 2023-12-06 NOTE — TELEPHONE ENCOUNTER
[facility] INPATIENT ENCOUNTER  PROGRESS NOTE    ADMISSION DATE:  11/30/2020  ATTENDING PHYSICIAN:  Gold Peterson MD   CODE STATUS:  Selective Treatment/DNR      CHIEF COMPLAINT:  GIB    SUBJECTIVE:        No outward signs of GI bleeding today.  Small amount of red stool noted overnight  Patient denies abdominal pain nausea or vomiting.       HPI  The patient is a 64-year-old male with a history of GIB, chronic anemia, ITP, ESRD on HD, CAD s/p CABG x 4 and current Covid-19 infection dx on 11/18. An EGD in October revealed erosive esophagitis, multiple gastric erosions, and duodenal erosion and he was started on Protonix BID. He was hospitalized on 11/18 in [place] for colonoscopy for hematochezia which found multiple bleeding cecal AVMs s/p APC. Three days after discharge he had confusion and weakness while at home and was admitted to [place] where he was found to be hypoxic and anemic with a Hgb of 6.5 requring 2 u PRBCs. He started to have enrrique/ maroon stools requiring 7 u PRBCs and was transferred to [facility] on 11/30/20 for further management.  He takes 81 mg of aspirin daily but no NSAIDs. He is a former heavy smoker and occasionally drinks beer. He denies abdominal surgeries, family history of celiac, crohns, UC, or GI cancers.   At the time of examination, the patient is resting comfortably on Optiflow. He had 4 enrrique stools with blood clots this AM and has required 2 u PRBCs. He complains of intermittent bilateral lower quadrant abdominal cramping before BMs. He also reports hematochezia with clots with no formed or brown stool. He denies dysphagia, nausea, vomiting, or melena.    HISTORIES:    I have reviewed the past medical history, family history, social history, medications and allergies listed in the medical record as obtained by my nursing staff and support staff and agree with their documentation.    ALLERGIES:   Allergen Reactions   • Augmentin [Amoxicillin-Pot Clavulanate] DIARRHEA     Current  Patient scheduled at next available on 12/14 for lumbar ARACELI. Patient's wife is requesting a call from provider regarding her husbands pain. She said Hydrocodone was prescribed yesterday and is not helping with his pain at all. States they have been to the ER at [place] x 4 recently and he can't find any relief of pain. She feels he needs to be admitted to the hospital and has multiple concerns. Iunshf-[phone]. Thanks!   Facility-Administered Medications   Medication Dose Route Frequency Provider Last Rate Last Admin   • sodium citrate anticoagulant 4 % flush 3 mL  3 mL Intracatheter PRN Shayy Cobos PA-C       • sodium chloride (NORMAL SALINE) 0.9 % bolus 100-200 mL  100-200 mL Intravenous PRN Shayy Cobos PA-C       • albumin human (SPA) 25 % injection 12.5 g  12.5 g Intravenous PRN Shayy Cobos PA-C   Stopped at 12/04/20 0944   • sodium chloride 0.9% infusion   Intravenous Continuous PRN Jessica Robert DO       • diphenhydrAMINE (BENADRYL) capsule 25 mg  25 mg Oral PRN Alexandre Butterfield MD       • acetaminophen (TYLENOL) tablet 650 mg  650 mg Oral PRN Alexandre Butterfield MD       • pantoprazole (PROTONIX) EC tablet 40 mg  40 mg Oral 2 times per day Armando Owens MD   40 mg at 12/03/20 2124   • epoetin taye-epbx (RETACRIT) 44805 UNIT/ML injection 10,000 Units  10,000 Units Subcutaneous Once per day on Tue Thu Sat Shayy Cobos PA-C   10,000 Units at 12/03/20 1718   • traZODone (DESYREL) tablet 50 mg  50 mg Oral Nightly PRN Shaun Valdez MD   50 mg at 12/03/20 2329   • sodium chloride (NORMAL SALINE) 0.9 % bolus 500 mL  500 mL Intravenous PRN Lilly Quan MD       • sodium chloride 0.9 % flush bag 25 mL  25 mL Intravenous PRN Lilly Quan MD       • sodium chloride (PF) 0.9 % injection 2 mL  2 mL Intracatheter 2 times per day Lilly Quan MD   2 mL at 12/04/20 1202   • sodium chloride 0.9% infusion   Intravenous Continuous PRN Lilly Quan MD       • sodium chloride 0.9% infusion   Intravenous Continuous PRN Lilly Quan MD       • dextrose 50 % injection 25 g  25 g Intravenous PRN Lilly Quan MD       • dextrose 50 % injection 12.5 g  12.5 g Intravenous PRN Lilly Quan MD       • dextrose 5 % infusion   Intravenous Continuous PRN Lilly Quan MD       • glucagon (GLUCAGEN) injection 1 mg  1 mg Intramuscular PRN Lilly Quan MD       • dextrose (GLUTOSE) 40 % gel 15 g  15 g Oral PRN Lilly Quan MD       •  dextrose (GLUTOSE) 40 % gel 30 g  30 g Oral PRN Lilly Quan MD       • insulin regular (human) (HumuLIN R, NovoLIN R) sliding scale injection   Subcutaneous 4x Daily AC & HS Lilly Quan MD       • albuterol inhaler 2 puff  2 puff Inhalation Q4H Resp PRN Lilly Quan MD   2 puff at 12/01/20 0603   • B complex-vitamin C-folic acid (NEPHRO-CORTEZ) tablet 0.8 mg  1 tablet Oral Daily Lilly Quan MD   0.8 mg at 12/03/20 0806   • levothyroxine (SYNTHROID, LEVOTHROID) tablet 25 mcg  25 mcg Oral Daily Lilly Quan MD   25 mcg at 12/04/20 0651   • methylPREDNISolone (SOLU-Medrol) PF injection 40 mg  40 mg Intravenous 2 times per day Bobby Jackson MD   40 mg at 12/03/20 2118   • fentaNYL (SUBLIMAZE) injection 25 mcg  25 mcg Intravenous Q2H PRN Bobby Jackson MD   25 mcg at 12/04/20 0729     Immunization History   Administered Date(s) Administered   • Hep B, adolescent or pediatric 12/12/2012, 01/16/2013, 02/13/2013, 06/12/2013, 02/15/2017   • Hep B, dialysis 02/15/2017   • Influenza Quadrivalent, MDCK (Flucelvax) 09/11/2017   • Influenza, Unspecified Formulation 09/06/2012, 10/01/2012, 09/01/2013, 09/01/2014, 10/01/2015, 09/01/2017, 10/08/2018, 09/10/2019, 09/18/2020   • Influenza, high dose seasonal, preservative-free 09/10/2019   • Influenza, injectable, MDCK, preservative free, quadrivalent 10/08/2018   • Influenza, seasonal, injectable, preservative free 09/06/2012, 09/04/2013, 09/05/2014, 09/21/2015, 09/19/2016   • Influenza, seasonal, injectable, trivalent 10/19/2010, 10/01/2011, 09/06/2012, 11/01/2012, 09/24/2013, 09/19/2016, 10/01/2018   • PPD 10/21/2012, 11/01/2012, 11/20/2013, 08/01/2014, 11/06/2015, 11/07/2016, 11/06/2017, 11/05/2018, 11/04/2019   • Pneumococcal Conjugate 13 valent 09/13/2016   • Pneumococcal Conjugate 7 Valent 09/13/2016   • Pneumococcal polysaccharide, adult, 23 valent 10/01/2011, 11/01/2011, 07/30/2019   • Pneumococcal, Unspecified Formulation 10/01/2011, 08/01/2012, 09/30/2016,  07/30/2019   • TD Adult, Adsorbed 08/14/2002, 06/21/2008   • TD Adult, Unspecified Formulation 01/01/2009, 07/31/2018   • Td:Adult type tetanus/diphtheria 08/14/2002, 06/21/2008   • Tdap 07/30/2018   Deferred Date(s) Deferred   • Pneumococcal polysaccharide, adult, 23 valent 11/20/2020     Past Medical History:   Diagnosis Date   • Anemia 08/20/2013    Chronic   • Blood clot associated with vein wall inflammation    • Bursitis    • CAD S/P percutaneous coronary angioplasty 05/1999    inferior wall MI S/PPTCA  RCA   • Chronic ITP (idiopathic thrombocytopenia) (CMS/[facility]) 2001    platelet may range from 20,000-125,000 as of 8/20/13   • Congestive cardiac failure (CMS/[facility]) 08/08/2012   • COPD (chronic obstructive pulmonary disease) (CMS/[facility]) 8/12    not on any maintenance inhalers   • End stage renal disease on dialysis (CMS/[facility]) 08/2012    end stage renal disease , KD M W F @ [facility]   • ESRD on dialysis (CMS/[facility])    • Essential (primary) hypertension 1999   • Fracture    • Full dentures    • History of blood transfusion 08/2012   • History of endocarditis 08/08/2012 8/8,/12.  Echo: LVEF 50%, mitral valve vegetation with mitral regurgitation, mild aortic regurgitation, moderate pulmonary hypertension.S/P MVR with bioprosthesis.    • History of Gram positive sepsis 8/8/2012    Admitted for gram positive sepsis 8-8-12, transferred to [facility] for urgent CABGx4, intra aortic balloon pump 8-11-12, Acute ischemia and and embolectomy 8-12-12,eventually right AKA 8-14-12..  Complicated by SAH    • History of MRSA infection 03/26/2013 03/20/13 OP hand+   • History of prostatitis    • Hx of colonoscopy 09/06/2012    Dr Hernández - Small internal hemorrhoids.No polyps, masses or other lesions visualized.   • Hypothyroidism    • Ischemia of digits of hand     8/22/13 partial amputation, left fifth finger   • Mobility impaired     crutches or walker   • Old myocardial infarction 5/99   • Other and unspecified  hyperlipidemia    • Other chronic pain     right shoulder   • Personal history of failed moderate sedation     pt sts he has \"woken up\" during procedure. Then was good in .   • Phantom pain after amputation of lower extremity (CMS/[facility])    • Pneumonia    • Renal failure    • SAH (subarachnoid hemorrhage) (CMS/[facility]) 2012    during hospitalization for sepsis no surgical intervention 2012   • Septic embolism (CMS/[facility])    • Wears glasses      Past Surgical History:   Procedure Laterality Date   • Arteriovenous anastomosis  2012    left forearm for renal dialysis by Dr. Peraza   • Cardiac surgery  2012    MVR   • Cardiac surgery  2012    CABG x 4 vessels   • Cdl cath poss ptca  10/20/2016   • Colonoscopy diagnostic  2010    Colonoscopy, Dx,apparently negative   • Coronary angioplasty  1999   • Coronary angioplasty with stent placement      x2   • Embolectomy  2012   • Finger amputation  2013    Partial (L) 5th Finger Amputation   • Leg amputation through knee Right 2012    AKA   • Tonsillectomy and adenoidectomy     • Vascular surgery     • Vasectomy     • Xray fistula abscess sinus tract      x 5     Social History     Tobacco Use   • Smoking status: Former Smoker     Packs/day: 1.00     Years: 42.00     Pack years: 42.00     Types: Cigarettes     Start date: 1972     Quit date: 1/10/2014     Years since quittin.9   • Smokeless tobacco: Never Used   • Tobacco comment: Smoked 1 pk-1.25 pk day for at least 15 years.  then cut back to 1/2 ppd, pk yrs at minimum 32.25   Substance Use Topics   • Alcohol use: Yes     Alcohol/week: 4.0 - 5.0 standard drinks     Types: 4 - 5 Cans of beer per week     Frequency: 2-3 times a week     Drinks per session: 1 or 2     Binge frequency: Never     Comment: weekly   • Drug use: No     Social History     Tobacco Use   Smoking Status Former Smoker   • Packs/day: 1.00   • Years: 42.00   • Pack years: 42.00   • Types:  Cigarettes   • Start date: 1972   • Quit date: 1/10/2014   • Years since quittin.9   Smokeless Tobacco Never Used   Tobacco Comment    Smoked 1 pk-1.25 pk day for at least 15 years.  then cut back to 1/2 ppd, pk yrs at minimum 32.25     Social History     Substance and Sexual Activity   Alcohol Use Yes   • Alcohol/week: 4.0 - 5.0 standard drinks   • Types: 4 - 5 Cans of beer per week   • Frequency: 2-3 times a week   • Drinks per session: 1 or 2   • Binge frequency: Never    Comment: weekly     Family History   Problem Relation Age of Onset   • COPD Mother    • Kidney disease Mother    • Cancer, Lung Mother    • Hypertension Mother    • Cancer Brother         lymphoma   • Clotting Disorder Daughter    • Hypertension Son    • Heart disease Sister    • Myocardial Infarction Sister    • Cancer, Breast Sister    • Systemic Lupus Erythematosus Sister    • COPD Sister    • Patient is unaware of any medical problems Father    • Heart disease Sister         Lupus related   • Hypertension Sister        REVIEW OF SYSTEMS:    All systems reviewed and are negative with the exception of the findings noted in the history of present illness.      OBJECTIVE:    PROBLEM LIST:    Patient Active Problem List   Diagnosis   • S/P CABG x 4   • S/P MVR (mitral valve replacement)   • Hemodialysis patient (CMS/[facility])   • Ulnar nerve compression   • Hyperlipidemia   • CAD S/P percutaneous coronary angioplasty   • Old myocardial infarction   • History of congestive heart failure   • History of endocarditis   • History of MRSA infection   • End stage renal disease on dialysis (CMS/[facility])   • Essential (primary) hypertension   • COPD (chronic obstructive pulmonary disease) (CMS/[facility])   • Ischemia of digits of hand   • Tobacco abuse disorder   • History of prostatitis   • Anemia of chronic disease   • Hypothyroidism   • Other and unspecified noninfectious gastroenteritis and colitis(558.9)   • Health care maintenance   • History of  amputation of right lower extremity through tibia and fibula (CMS/[facility])   • History of colitis   • Anemia due to chronic kidney disease, on chronic dialysis (CMS/[facility])   • ESRD (end stage renal disease) (CMS/[facility])       MEDICATIONS:    Medications were reviewed.     VITAL SIGNS:    Vital Last Value 24 Hour Range   Temperature 98.5 °F (36.9 °C) (12/04/20 1135) Temp  Min: 97.5 °F (36.4 °C)  Max: 98.5 °F (36.9 °C)   Pulse (!) 113 (12/04/20 1215) Pulse  Min: 84  Max: 133   Respiratory (!) 27 (12/04/20 1215) Resp  Min: 15  Max: 58   Non-Invasive  Blood Pressure 120/67 (12/04/20 1215) BP  Min: 91/60  Max: 158/67   Pulse Oximetry 94 % (12/04/20 1310) SpO2  Min: 87 %  Max: 100 %     Vital Today Admitted   Weight 55 kg (12/04/20 1145) Weight: 60 kg (11/30/20 1707)   Height N/A Height: 5' 6\" (167.6 cm) (11/30/20 1707)   BMI N/A BMI (Calculated): 21.35 (11/30/20 1707)     INTAKE/OUTPUT:      Intake/Output Summary (Last 24 hours) at 12/4/2020 1349  Last data filed at 12/4/2020 1131  Gross per 24 hour   Intake 893 ml   Output 3405 ml   Net -2512 ml        PHYSICAL EXAM:    General: adult male NAD  Neurologic: Alert. Normal mood and affect  Skin: Warm and dry, normal turgor.  Head: Normocephalic.  Eyes: Normal conjunctivae and sclerae.    HEENT: Oral pharynx clear  Neck: Symmetric without swelling or tenderness.   Respiratory: Increased respiratory effort. On optiflow  Cardiovascular: Regular rate and rhythm.  Extremities: LLE edema   Gastrointestinal:  Soft and nontender. Normal bowel sounds.  ecchymosis to lower quadrants.     LABORATORY DATA:    WBC (K/mcL)   Date Value   12/04/2020 21.2 (H)   05/14/2019 7.5     RBC (mil/mcL)   Date Value   12/04/2020 2.46 (L)   05/14/2019 3.62 (L)     HCT (%)   Date Value   12/04/2020 21.7 (L)   05/14/2019 35.0 (L)     HGB (g/dL)   Date Value   12/04/2020 7.1 (L)   05/14/2019 11.1 (L)     PLT   Date Value   12/04/2020 58 K/mcL (L)   06/04/2019 137 K/mcL (L)   12/14/2016 122 x 10'3 cells (L)    10/31/2012 66 K/mcL (L)     Sodium (mmol/L)   Date Value   12/04/2020 139   05/14/2019 137     Potassium (mmol/L)   Date Value   12/04/2020 4.5   05/14/2019 3.7     Chloride (mmol/L)   Date Value   12/04/2020 109 (H)   05/14/2019 101     Glucose (mg/dL)   Date Value   12/04/2020 97   05/14/2019 92     CALCIUM (mg/dL)   Date Value   05/14/2019 9.5   10/29/2012 8.8     Calcium (mg/dL)   Date Value   12/04/2020 7.7 (L)     CO2 (mmol/L)   Date Value   10/29/2012 26     Carbon Dioxide (mmol/L)   Date Value   12/04/2020 22   05/14/2019 29     BUN (mg/dL)   Date Value   12/04/2020 62 (H)   05/14/2019 30 (H)     Creatinine (mg/dL)   Date Value   12/04/2020 3.58 (H)   05/14/2019 3.20 (H)       IMAGING STUDIES:    Imaging studies reviewed.  The pertinent positives are    11/29 NM GI Blood Loss   IMPRESSION: 1. No scintigraphic evidence of active gastrointestinal bleed.    ENDOSCOPY:     11/18/2020 Colonoscopy  FINDINGS: Number of polyps identified in the colon: o  There was a combination of old and fresh blood present throughout the colon.  There were multiple AVMs identified in the cecum with some of them actively bleeding.  There were few AVMs in the ascending colon as well which were actively bleeding.  All the bleeding AVMs were ablated by using argon plasma coagulation cautery set at right colon settings.The colonoscopy otherwise appeared to be normal to cecum.      10/26/2020 Esophagogastroduodenoscopy  FINDINGS:   Esophagus:  The esophagus appeared to have LA class B erosive esophagitis in the distal 1.5 cm. Biopsies were obtained from midesophagus to rule out eosinophilic esophagitis.  EGJ:The Z-line was measured at 42 cm, EGJ at 42 cm and hiatus at 42 cm from the incisors. Hill grade 2?  Stomach: The stomach appeared to have multiple erosions in the antrum. Biopsies were obtained from the antrum to rule out H.pylori  Duodenum/small bowel: The duodenum appeared to have multiple erosions in the bulb. Biopsies were  obtained from the second portion of duodenum to rule out celiac disease.    Assessment:   64-year-old male with a GI bleed     1. GI bleed    -  Recent colonoscopy  with multiple cecal AVMs,  actively bleeding and treated with APC.    - s/p CT angio  with concern for arterial intraluminal bleed in the cecum s/p  mesenteric angiogram with embolization of 2 branches of the right ileocolic artery bleeding at the cecum 12/1      2. Acute blood loss anemia   - mass cell transfusions. Continues to require PRBC    - slightly down-trending however overt bleeding improving   3.  Thrombocytopenia secondary to ITP  4.  COVID-19 pneumonia   5.  Hypoxemia   - high-flow o2   6.  Elevated troponin   - demand ischemia     PLAN:   1.  Ok  to advance diet.  Overt GI bleeding is improving,  Continue to monitor for outward signs of GI bleeding and follow H&H.   If has worsening anemia and or outward signs of bleeding increases will plan a diagnostic colonoscopy.                          Thank you for involving us in the care of this patient     Brenna Wagner PA-C                 ATTENDING ADDENDUM:  I independently saw and evaluated the patient.  Visit Vitals  /59   Pulse (!) 104   Temp 98 °F (36.7 °C) (Oral)   Resp (!) 29   Ht 5' 6\" (1.676 m)   Wt 55 kg   SpO2 95%   BMI 19.57 kg/m²     Abdomen - Soft, non-tender, bowel sounds normal, no masses, hepatomegaly or splenomegaly noted  I discussed their management with the PA.  I reviewed the PA note and agree with the documented findings.    Monitor Hgb and hct  PPI  Monitor platelet count     20

## 2023-12-06 NOTE — TELEPHONE ENCOUNTER
Can you please try and prioritize him and get him in before the end of the year/next available       Physician - Dr Gutierrez    Type of Procedure/Injection - Lumbar Epidural  L5/S1           Laterality - NA      Anxiolysis- Local      Need to hold medication - Yes      Aspirin for 7 days      Clearance needed - Yes      Follow up - 2 week

## 2023-12-06 NOTE — TELEPHONE ENCOUNTER
Hi Patient scheduled for a lumbar ESIwith Dr. Gutierrez on 12/14. Provider requests that patient hold ASA x 7 days prior. Is patient cleared to hold ASA for scheduled epidural injection? Thanks!

## 2023-12-14 ENCOUNTER — HOSPITAL ENCOUNTER (OUTPATIENT)
Dept: RADIOLOGY | Facility: HOSPITAL | Age: 68
Discharge: HOME | End: 2023-12-14
Attending: ANESTHESIOLOGY | Admitting: ANESTHESIOLOGY
Payer: MEDICARE

## 2023-12-14 ENCOUNTER — HOSPITAL ENCOUNTER (OUTPATIENT)
Facility: HOSPITAL | Age: 68
Discharge: HOME | End: 2023-12-14
Attending: ANESTHESIOLOGY | Admitting: ANESTHESIOLOGY
Payer: MEDICARE

## 2023-12-14 DIAGNOSIS — M54.16 LUMBAR RADICULOPATHY: Primary | ICD-10-CM

## 2023-12-14 DIAGNOSIS — M54.50 LOWER BACK PAIN: ICD-10-CM

## 2023-12-14 PROCEDURE — 25500020 PHARM REV CODE 255: Mod: PO | Performed by: ANESTHESIOLOGY

## 2023-12-14 PROCEDURE — A4216 STERILE WATER/SALINE, 10 ML: HCPCS | Mod: PO | Performed by: ANESTHESIOLOGY

## 2023-12-14 PROCEDURE — 25000003 PHARM REV CODE 250: Mod: PO | Performed by: ANESTHESIOLOGY

## 2023-12-14 PROCEDURE — 62323 PR INJ LUMBAR/SACRAL, W/IMAGING GUIDANCE: ICD-10-PCS | Mod: ,,, | Performed by: ANESTHESIOLOGY

## 2023-12-14 PROCEDURE — 62323 NJX INTERLAMINAR LMBR/SAC: CPT | Mod: PO | Performed by: ANESTHESIOLOGY

## 2023-12-14 PROCEDURE — 63600175 PHARM REV CODE 636 W HCPCS: Mod: PO | Performed by: ANESTHESIOLOGY

## 2023-12-14 PROCEDURE — 76000 FLUOROSCOPY <1 HR PHYS/QHP: CPT | Mod: TC,PO

## 2023-12-14 PROCEDURE — 62323 NJX INTERLAMINAR LMBR/SAC: CPT | Mod: ,,, | Performed by: ANESTHESIOLOGY

## 2023-12-14 RX ORDER — METHYLPREDNISOLONE ACETATE 80 MG/ML
INJECTION, SUSPENSION INTRA-ARTICULAR; INTRALESIONAL; INTRAMUSCULAR; SOFT TISSUE
Status: DISCONTINUED | OUTPATIENT
Start: 2023-12-14 | End: 2023-12-14 | Stop reason: HOSPADM

## 2023-12-14 RX ORDER — SODIUM CHLORIDE 9 MG/ML
INJECTION, SOLUTION INTRAMUSCULAR; INTRAVENOUS; SUBCUTANEOUS
Status: DISCONTINUED | OUTPATIENT
Start: 2023-12-14 | End: 2023-12-14 | Stop reason: HOSPADM

## 2023-12-14 RX ORDER — LIDOCAINE HYDROCHLORIDE 10 MG/ML
INJECTION, SOLUTION EPIDURAL; INFILTRATION; INTRACAUDAL; PERINEURAL
Status: DISCONTINUED | OUTPATIENT
Start: 2023-12-14 | End: 2023-12-14 | Stop reason: HOSPADM

## 2023-12-14 RX ORDER — ALPRAZOLAM 0.5 MG/1
1 TABLET, ORALLY DISINTEGRATING ORAL ONCE AS NEEDED
Status: COMPLETED | OUTPATIENT
Start: 2023-12-14 | End: 2023-12-14

## 2023-12-14 RX ADMIN — ALPRAZOLAM 1 MG: 0.5 TABLET, ORALLY DISINTEGRATING ORAL at 02:12

## 2023-12-14 NOTE — OP NOTE
"Procedure Note    Procedure Date: 12/14/2023    Procedure Performed:  L5/S1 lumbar interlaminar epidural steroid injection under fluoroscopy.    Indications:  Asim Wilson presents with lumbar radiculitis/radiculopathy secondary to disc herniation, osteophyte/osteophyte complexes, and/or severe degenerative disc disease producing foraminal or central spinal stenosis.  The pain has been present for at least 4 weeks and the patient has failed to respond to noninvasive conservative care.  Pain rated by NRS at baseline prior to intervention is 6/10.  Their radiculitis/radiculopathy and/or neurogenic claudication is severe enough to greatly impact their quality of life or function.     Pre-op diagnosis: Lumbar Radiculopathy    Post-op diagnosis: same    Physician: Leander Gutierrez MD    IV anxiolysis medications: none    Medications injected: depomedrol 80mg, 1% Lidocaine 1ml, 2 mL sterile, preservative-free normal saline.    Local anesthetic used: 1% Lidocaine, 1 ml    Estimated Blood Loss: none    Complications:  none    Technique:  The patient was interviewed in the holding area and Risks/Benefits were discussed, including, but not limited to, the possibility of new or different pain, bleeding or infection.   All questions were answered.  The patient understood and accepted risks.  Consent was verfied.  A time-out was taken to identify patient and procedure prior to starting the procedure. The patient was placed in the prone position on the fluoroscopy table. The area of the lumbar spine was prepped with Chloraprep x2 and draped in a sterile manner. The L5/S1 interspace was identified and marked under AP fluoroscopy. The skin and subcutaneous tissues overlying the targeted interspace were anesthetized with 3-5 mL of 1% lidocaine using a 25G, 1.5" needle.  A 20G, 3.5" Tuohy epidural needle was directed toward the interspace under fluoroscopic guidance until the ligamentum flavum was engaged. From this point, a loss of " resistance technique with a glass syringe and saline was used to identify entrance of the needle into the epidural space. Once loss of resistance was observed 1 mL of contrast solution was injected. An appropriate epidurogram was noted.  A 4 mL mixture consisting of saline, 1 mL 1% Lidocaine and 80 mg of depomedrol was injected slowly and without resistance.  The needle was flushed with normal saline and removed. The contrast was seen to be displaced after injection. Patient was awake/responsive during all injections.  The patient tolerated the procedure well and was transferred to the [facility] in stable condition.  The patient was monitored after the procedure and was given post-procedure and discharge instructions to follow at home. The patient was discharged in a stable condition.

## 2023-12-14 NOTE — INTERVAL H&P NOTE
The patient has been examined and the H&P has been reviewed:    I concur with the findings and no changes have occurred since H&P was written.  He has held aspirin appropriately.       There are no hospital problems to display for this patient.

## 2023-12-14 NOTE — DISCHARGE SUMMARY
[facility]  Discharge Note  Short Stay    Admit Date: 12/14/2023    Discharge Date: 12/14/2023    Attending Physician: Leander Gutierrez     Discharge Provider: Leander Gutierrez    Diagnoses:  There are no hospital problems to display for this patient.      Discharged Condition: Good    Final Diagnoses: Lumbar radiculopathy [M54.16]    Disposition: Home or Self Care    Hospital Course: No complications, uneventful    Outcome of Hospitalization, Treatment, Procedure, or Surgery:  Patient was admitted for outpatient interventional pain management procedure. The patient tolerated the procedure well with no complications.    Follow up/Patient Instructions:  Follow up as scheduled in Pain Management office in 2-3 weeks.  Patient has received instructions and follow up date and time.    Medications:  Continue previous medications, except restart aspirin in 24 hours    Discharge Procedure Orders   Notify your health care provider if you experience any of the following:  temperature >100.4     Notify your health care provider if you experience any of the following:  persistent nausea and vomiting or diarrhea     Notify your health care provider if you experience any of the following:  severe uncontrolled pain     Notify your health care provider if you experience any of the following:  redness, tenderness, or signs of infection (pain, swelling, redness, odor or green/yellow discharge around incision site)     Notify your health care provider if you experience any of the following:  difficulty breathing or increased cough     Notify your health care provider if you experience any of the following:  severe persistent headache     Notify your health care provider if you experience any of the following:  worsening rash     Notify your health care provider if you experience any of the following:  persistent dizziness, light-headedness, or visual disturbances     Notify your health care provider if you experience any of the  following:  increased confusion or weakness     Activity as tolerated

## 2023-12-15 VITALS
DIASTOLIC BLOOD PRESSURE: 66 MMHG | HEIGHT: 71 IN | RESPIRATION RATE: 17 BRPM | TEMPERATURE: 98 F | HEART RATE: 68 BPM | SYSTOLIC BLOOD PRESSURE: 118 MMHG | BODY MASS INDEX: 34.58 KG/M2 | OXYGEN SATURATION: 99 % | WEIGHT: 247 LBS

## 2023-12-20 ENCOUNTER — PATIENT OUTREACH (OUTPATIENT)
Dept: ADMINISTRATIVE | Facility: HOSPITAL | Age: 68
End: 2023-12-20
Payer: MEDICARE

## 2023-12-20 NOTE — PROGRESS NOTES
Population Health Chart Review & Patient Outreach Details    Outreach Performed: YES Telephone Successful    Additional Pop Health Notes:           Updates Requested / Reviewed:      Updated Care Coordination Note, , and Immunizations Reconciliation Completed or Queried: [place]         Health Maintenance Topics Overdue:    Health Maintenance Due   Topic Date Due    Pneumococcal Vaccines (Age 65+) (1 - PCV) Never done    TETANUS VACCINE  Never done    Shingles Vaccine (1 of 2) Never done    RSV Vaccine (Age 60+ and Pregnant patients) (1 - 1-dose 60+ series) Never done    Eye Exam  06/21/2022    COVID-19 Vaccine (4 - 2023-24 season) 09/01/2023         Health Maintenance Topic(s) Outreach Outcomes & Actions Taken:    Eye Exam - Outreach Outcomes & Actions Taken  : Pt will call back to schedule after the new year

## 2024-01-02 ENCOUNTER — TELEPHONE (OUTPATIENT)
Dept: PAIN MEDICINE | Facility: CLINIC | Age: 69
End: 2024-01-02

## 2024-01-02 ENCOUNTER — OFFICE VISIT (OUTPATIENT)
Dept: PAIN MEDICINE | Facility: CLINIC | Age: 69
End: 2024-01-02
Payer: MEDICARE

## 2024-01-02 ENCOUNTER — TELEPHONE (OUTPATIENT)
Dept: PRIMARY CARE CLINIC | Facility: CLINIC | Age: 69
End: 2024-01-02
Payer: MEDICARE

## 2024-01-02 VITALS
HEART RATE: 82 BPM | SYSTOLIC BLOOD PRESSURE: 116 MMHG | HEIGHT: 71 IN | DIASTOLIC BLOOD PRESSURE: 64 MMHG | BODY MASS INDEX: 34.11 KG/M2 | WEIGHT: 243.63 LBS | RESPIRATION RATE: 16 BRPM

## 2024-01-02 DIAGNOSIS — M54.16 LUMBAR RADICULOPATHY: Primary | ICD-10-CM

## 2024-01-02 DIAGNOSIS — M48.062 SPINAL STENOSIS OF LUMBAR REGION WITH NEUROGENIC CLAUDICATION: ICD-10-CM

## 2024-01-02 PROCEDURE — 99214 OFFICE O/P EST MOD 30 MIN: CPT | Mod: S$GLB,,, | Performed by: ANESTHESIOLOGY

## 2024-01-02 PROCEDURE — 99999 PR PBB SHADOW E&M-EST. PATIENT-LVL III: CPT | Mod: PBBFAC,,, | Performed by: ANESTHESIOLOGY

## 2024-01-02 RX ORDER — DULOXETIN HYDROCHLORIDE 30 MG/1
30 CAPSULE, DELAYED RELEASE ORAL DAILY
Qty: 30 CAPSULE | Refills: 1 | Status: SHIPPED | OUTPATIENT
Start: 2024-01-02 | End: 2024-02-12

## 2024-01-02 RX ORDER — ALPRAZOLAM 1 MG/1
1 TABLET, ORALLY DISINTEGRATING ORAL ONCE AS NEEDED
Status: CANCELLED | OUTPATIENT
Start: 2024-01-02 | End: 2035-05-31

## 2024-01-02 NOTE — H&P (VIEW-ONLY)
[facility] Follow Up Evaluation      Referred by: No ref. provider found    PCP:     CC:   Chief Complaint   Patient presents with    Low-back Pain     ARACELI follow up          1/2/2024     8:31 AM 12/5/2023    11:08 AM 11/14/2023     9:22 AM   Last 3 PDI Scores   Pain Disability Index (PDI) 49 42 35         Interval HPI 1/2/2024: Asim Wilson returns to the office for follow up.  He is s/p L5/S1 ARACELI on 12/14/23 70-80% relief of his lower extremity pain for 2-3 days.  He reports after that his pain returned.  Today he reports he continues to have pain that radiates past his knees to his feet that is worse with standing and walking and relieved with rest.  His take pain can get to 10/0.  No new numbness or weakness.      HPI:   Asim Wilson is a 68 y.o. male patient who has a past medical history of Arthritis, Asthma, C. difficile colitis, Cataract, Colon polyp, Diabetic retinopathy, Gout attack, Hypertension, GERONIMO (obstructive sleep apnea), and Thyroid disease. He presents with back pain.  He has been having lower back pain for several years and has been gradually worsening.  Today his pain is 5/10 however at times it can get up to 10/10.  He reports his pain is intermittent, aching, sharp, shooting.  He is not having any radicular pain at this time.  Pain is worse with sitting, bending, walking, coughing, lifting.  He gets some relief with gout medications such as Indocin.      Pain Intervention History:  - s/p L5/S1 ARACELI on 12/14/23     Past Spine Surgical History:      Past and current medications:  Antineuropathics: lyrica caused rash  NSAIDs: indomethacin   Physical therapy: no, currently too painful  Antidepressants:  Muscle relaxers:  Robaxin  Opioids: hydrocodone   Antiplatelets/Anticoagulants: aspirin     History:    Current Outpatient Medications:     allopurinoL (ZYLOPRIM) 300 MG tablet, TAKE 1 TABLET (300 MG TOTAL) BY MOUTH DAILY AS NEEDED., Disp: 90 tablet, Rfl: 3    aspirin (ECOTRIN) 81  MG EC tablet, Take 1 tablet (81 mg total) by mouth once daily., Disp: 90 tablet, Rfl: 0    gabapentin (NEURONTIN) 300 MG capsule, Take 1 capsule (300 mg total) by mouth 3 (three) times daily., Disp: 90 capsule, Rfl: 11    HYDROcodone-acetaminophen (NORCO) 5-325 mg per tablet, Take 1 tablet by mouth every 12 (twelve) hours as needed for Pain., Disp: 30 tablet, Rfl: 0    indomethacin (INDOCIN) 50 MG capsule, Take 1 capsule (50 mg total) by mouth 2 (two) times daily with meals., Disp: 15 capsule, Rfl: 0    levocetirizine (XYZAL) 5 MG tablet, TAKE 1 TABLET BY MOUTH EVERY DAY IN THE EVENING, Disp: 90 tablet, Rfl: 3    levothyroxine (SYNTHROID) 137 MCG Tab tablet, Take 1 tablet (137 mcg total) by mouth before breakfast., Disp: 90 tablet, Rfl: 3    methocarbamoL (ROBAXIN) 500 MG Tab, Take 1 tablet (500 mg total) by mouth 2 (two) times daily as needed (muscle spasms)., Disp: 20 tablet, Rfl: 0    montelukast (SINGULAIR) 10 mg tablet, Take 1 tablet (10 mg total) by mouth once daily., Disp: 90 tablet, Rfl: 3    olmesartan (BENICAR) 20 MG tablet, Take 1 tablet (20 mg total) by mouth once daily., Disp: 90 tablet, Rfl: 3    rosuvastatin (CRESTOR) 10 MG tablet, TAKE 1 TABLET BY MOUTH EVERY DAY, Disp: 90 tablet, Rfl: 3    colchicine, gout, (COLCRYS) 0.6 mg tablet, Take 1 tablet (0.6 mg total) by mouth 2 (two) times daily as needed (acute gout). (Patient not taking: Reported on 11/15/2023), Disp: 60 tablet, Rfl: 11    DULoxetine (CYMBALTA) 30 MG capsule, Take 1 capsule (30 mg total) by mouth once daily., Disp: 30 capsule, Rfl: 1    furosemide (LASIX) 20 MG tablet, Take 1 tablet (20 mg total) by mouth once daily. Do not take the pm dose after 3 pm, Disp: 30 tablet, Rfl: 0    Past Medical History:   Diagnosis Date    Arthritis     Asthma     C. difficile colitis 07/01/2023    in care everywhere    Cataract     Done OU    Colon polyp     Diabetic retinopathy     Gout attack     Hypertension     GERONIMO (obstructive sleep apnea)      noncompliant CPAP    Thyroid disease        Past Surgical History:   Procedure Laterality Date    CATARACT EXTRACTION W/  INTRAOCULAR LENS IMPLANT Right 06/13/2019    Dr Moses    CATARACT EXTRACTION W/  INTRAOCULAR LENS IMPLANT Left 07/18/2019    Dr Moses    COLONOSCOPY N/A 10/18/2022    Procedure: COLONOSCOPY;  Surgeon: Rahat Jarrell MD;  Location: [facility] ENDO;  Service: Endoscopy;  Laterality: N/A; Repeat colonoscopy in 3 years for surveillance    CORONARY ANGIOGRAPHY N/A 12/10/2020    Procedure: ANGIOGRAM, CORONARY ARTERY;  Surgeon: Roseanna Barr MD;  Location: [facility] CATH;  Service: Cardiology;  Laterality: N/A;    Cyst removed      From back of neck    cyst removed      from back    EPIDURAL STEROID INJECTION INTO LUMBAR SPINE N/A 12/14/2023    Procedure: Injection-steroid-epidural-lumbar     L5/S1;  Surgeon: Leander Gutierrez MD;  Location: [facility] OR;  Service: Pain Management;  Laterality: N/A;    LEFT HEART CATHETERIZATION Left 12/10/2020    Procedure: Left heart cath;  Surgeon: Roseanna Barr MD;  Location: [facility] CATH;  Service: Cardiology;  Laterality: Left;    PHACOEMULSIFICATION OF CATARACT Right 06/13/2019    Procedure: PHACOEMULSIFICATION, CATARACT;  Surgeon: Gabe Moses Jr., MD;  Location: [facility] OR;  Service: Ophthalmology;  Laterality: Right;  Right    PHACOEMULSIFICATION OF CATARACT Left 07/18/2019    Procedure: PHACOEMULSIFICATION, CATARACT;  Surgeon: Gabe Moses Jr., MD;  Location: [facility] OR;  Service: Ophthalmology;  Laterality: Left;  Left       Family History   Problem Relation Age of Onset    Cancer Mother     Alzheimer's disease Father     No Known Problems Sister     No Known Problems Sister     No Known Problems Sister     No Known Problems Maternal Grandmother     No Known Problems Maternal Grandfather     No Known Problems Paternal Grandmother     No Known Problems Paternal Grandfather     Amblyopia Neg Hx     Blindness Neg Hx     Cataracts Neg Hx     Diabetes Neg Hx      "Hypertension Neg Hx     Macular degeneration Neg Hx     Glaucoma Neg Hx     Retinal detachment Neg Hx     Strabismus Neg Hx     Stroke Neg Hx     Thyroid disease Neg Hx     Colon cancer Neg Hx     Crohn's disease Neg Hx     Esophageal cancer Neg Hx     Stomach cancer Neg Hx     Ulcerative colitis Neg Hx        Social History     Socioeconomic History    Marital status:    Tobacco Use    Smoking status: Former    Smokeless tobacco: Never    Tobacco comments:     Quit 40 yrs ago   Substance and Sexual Activity    Alcohol use: Not Currently     Comment: rarely    Drug use: Never       Review of patient's allergies indicates:   Allergen Reactions    Cleocin [clindamycin hcl] Other (See Comments)     ARF    Pregabalin        Review of Systems:  12 point review of systems is negative.    Physical Exam:  Vitals:    01/02/24 0833   BP: 116/64   Pulse: 82   Resp: 16   Weight: 110.5 kg (243 lb 9.7 oz)   Height: 5' 11" (1.803 m)   PainSc: 10-Worst pain ever   PainLoc: Back         Body mass index is 33.98 kg/m².    Gen: NAD  Psych: mood appropriate for given condition  HEENT: eyes anicteric   CV: RRR  HEENT: anicteric   Respiratory: non-labored, no signs of respiratory distress  Abd: non-distended  Skin: warm, dry and intact.  Gait: antalgic gait.       Sensory:  Intact and symmetrical to light touch in L1-S1 dermatomes bilaterally, except decreased in a nondermatomal distribution below his knees    Motor:     Right Left   L2/3 Iliacus Hip flexion  5  5   L3/4 Qudratus Femoris Knee Extension  5  5   L4/5 Tib Anterior Ankle Dorsiflexion   5  5   L5/S1 Extensor Hallicus Longus Great toe extension  5  5   S1/S2 Gastroc/Soleus Plantar Flexion  5  5      Right Left                  Patellar DTR 0 0   Achilles DTR 0 0                      Labs:  Lab Results   Component Value Date    HGBA1C 5.7 (H) 11/28/2023       Lab Results   Component Value Date    WBC 7.69 12/05/2023    HGB 13.5 (L) 12/05/2023    HCT 39.9 (L) 12/05/2023 "    MCV 91 12/05/2023     12/05/2023       Imaging:  X-ray lumbar spine with flexion-extension 09/28/2023  FINDINGS:  Mild rotatory levoconvex curvature of the lumbar spine.  Mild grade 1 anterolisthesis of L4 on L5.  No abnormal translation with flexion or extension.  Vertebral body heights are maintained without evidence of fracture or osseous destructive process.  Multilevel mild-to-moderate disc degeneration with intervertebral disc space narrowing, degenerative endplate change and marginal osteophyte formation, most pronounced at L2-3 through L4-5.  Lower lumbar facet arthropathy.  Aortoiliac calcific atherosclerosis.    MRI lumbar spine 11/10/2023  FINDINGS:  Vertebral column: Comparison plain films demonstrated a broad levocurvature of the lumbar spine.  There is suggestion of mild anterolisthesis of L4 on L5.  This is not clearly demonstrated on MRI but there is trace retrolisthesis of L2 on L3 which is exaggerated by osteophyte formation.  There is marked disc space narrowing at the T10-11 and T11-12 levels with mild-to-moderate disc space narrowing at the T12-L1 through L2-3 levels.  There is multilevel endplate osteophyte formation.  The discs are desiccated.  Baseline marrow signal is normal.     Spinal canal, conus, epidural space: The spinal canal is small on a developmental basis.  The conus terminates at the level of T12 and is grossly normal in signal intensity.  There is no abnormal epidural mass or fluid collection.     Findings by level:     On the sagittal images, in addition to marked disc space narrowing at both the T10-11 and T11-12 levels, there are shallow disc protrusions in addition a ligamentum flavum thickening.  There is moderate spinal stenosis and some degree of bilateral foraminal stenosis without acute cord compression.     T12-L1: There is disc space narrowing, diffuse disc bulge with osteophytic ridging in addition to facet joint arthropathy.  There is only borderline to  mild spinal stenosis.  There is however mild-to-moderate bilateral, left greater than right, foraminal stenosis.  L1-2: There is disc space narrowing, diffuse disc bulge with osteophytic ridging and facet joint arthropathy.  There is mild spinal stenosis with crowding of the left lateral recess.  There is mild-to-moderate left but only mild right foraminal stenosis.  L2-3: There is disc space narrowing, trace retrolisthesis of L2 on L3.  There is a diffuse disc bulge with osteophytic ridging eccentric to the right.  There is right greater than left facet joint arthropathy.  There is moderate spinal stenosis with crowding of the right lateral recess as well as severe right and moderate to severe left foraminal stenosis.  L3-4: There is a diffuse disc bulge with osteophytic ridging in addition to bilateral facet joint arthropathy resulting in moderate spinal stenosis, severe crowding of the lateral recess bilaterally as well as severe bilateral foraminal stenosis.  L4-5: There is a diffuse disc bulge with superimposed shallow broad central disc protrusion.  There is marked facet joint arthropathy with ligamentum flavum thickening.  There is severe spinal canal, bilateral lateral recess and foraminal stenosis.  L5-S1: There is left greater than right facet joint arthropathy.  There is no spinal stenosis.  There is moderate to marked left and only mild right foraminal stenosis.     Soft tissues, other: The posterior spinous muscles are mildly atrophic.  The prevertebral soft tissues appear normal.  The aorta is normal in caliber.  There is no hydronephrosis but there is a right renal cyst measuring approximately 10 mm.  This is present on concurrent CT abdomen and pelvis.      Assessment:   Problem List Items Addressed This Visit    None  Visit Diagnoses       Lumbar radiculopathy    -  Primary    Spinal stenosis of lumbar region with neurogenic claudication                  Asim Wilson is a 68 y.o. male patient  who has a past medical history of Arthritis, Asthma, C. difficile colitis, Cataract, Colon polyp, Diabetic retinopathy, Gout attack, Hypertension, GERONIMO (obstructive sleep apnea), and Thyroid disease. He presents with back pain.  He has been having lower back pain for several years and has been gradually worsening.  Today his pain is 5/10 however at times it can get up to 10/10.  He reports his pain is intermittent, aching, sharp, shooting.  He is not having any radicular pain at this time.  Pain is worse with sitting, bending, walking, coughing, lifting.  He gets some relief with gout medications such as Indocin.        1/2/24 - Aism Wilson returns to the office for follow up.  He is s/p L5/S1 ARACELI on 12/14/23 70-80% relief of his lower extremity pain for 2-3 days.  He reports after that his pain returned.  Today he reports he continues to have pain that radiates past his knees to his feet that is worse with standing and walking and relieved with rest.  His take pain can get to 10/0.  No new numbness or weakness.    - on exam he has full strength of his lower extremities.  Decreased sensation in a nondermatomal distribution below as needed  - I independently reviewed again his lumbar MRI with the patient and his wife and he has moderate central canal narrowing at L3-4 and severe central canal narrowing at L4-5.  He has multilevel bilateral facet arthropathy  - I think that his current pain is related to his severe central canal narrowing at L4-5.  He describes radicular pain and symptoms of claudication.    - his pain is currently too severe to participate in formal physical therapy.  He is not having any relief with Robaxin.  He avoids NSAIDs due to renal dysfunction.  He did not find all much relief with hydrocodone.  He has tried Lyrica in the past however caused him a rash so he discontinued it.  He has also not taking gabapentin for fear of developing the same rash.  I have prescribed him some Cymbalta to  use once a day for any neuropathic component of his pain  - his pain is limiting his mobility and interfering with his quality of life  - we will schedule for a caudal ARACELI The risks and benefits of this intervention, and alternative therapies were discussed with the patient.  The discussion of risks included infection, bleeding, need for additional procedures or surgery, nerve damage.  Questions regarding the procedure, risks, expected outcome, and possible side effects were solicited and answered to the patient's satisfaction.  Asim Katei Wilson wishes to proceed with the injection or procedure.  Written consent was obtained.  - follow up 2-3 weeks post injection.  If he fails to get relief with this then my recommendation would to be to get an evaluation with Neurosurgery      : Not applicable      This note was completed with dictation software and grammatical errors may exist.

## 2024-01-02 NOTE — PROGRESS NOTES
[facility] Follow Up Evaluation      Referred by: No ref. provider found    PCP:     CC:   Chief Complaint   Patient presents with    Low-back Pain     ARACELI follow up          1/2/2024     8:31 AM 12/5/2023    11:08 AM 11/14/2023     9:22 AM   Last 3 PDI Scores   Pain Disability Index (PDI) 49 42 35         Interval HPI 1/2/2024: Asim Wilson returns to the office for follow up.  He is s/p L5/S1 ARACELI on 12/14/23 70-80% relief of his lower extremity pain for 2-3 days.  He reports after that his pain returned.  Today he reports he continues to have pain that radiates past his knees to his feet that is worse with standing and walking and relieved with rest.  His take pain can get to 10/0.  No new numbness or weakness.      HPI:   Asim Wilson is a 68 y.o. male patient who has a past medical history of Arthritis, Asthma, C. difficile colitis, Cataract, Colon polyp, Diabetic retinopathy, Gout attack, Hypertension, GERONIMO (obstructive sleep apnea), and Thyroid disease. He presents with back pain.  He has been having lower back pain for several years and has been gradually worsening.  Today his pain is 5/10 however at times it can get up to 10/10.  He reports his pain is intermittent, aching, sharp, shooting.  He is not having any radicular pain at this time.  Pain is worse with sitting, bending, walking, coughing, lifting.  He gets some relief with gout medications such as Indocin.      Pain Intervention History:  - s/p L5/S1 ARACELI on 12/14/23     Past Spine Surgical History:      Past and current medications:  Antineuropathics: lyrica caused rash  NSAIDs: indomethacin   Physical therapy: no, currently too painful  Antidepressants:  Muscle relaxers:  Robaxin  Opioids: hydrocodone   Antiplatelets/Anticoagulants: aspirin     History:    Current Outpatient Medications:     allopurinoL (ZYLOPRIM) 300 MG tablet, TAKE 1 TABLET (300 MG TOTAL) BY MOUTH DAILY AS NEEDED., Disp: 90 tablet, Rfl: 3    aspirin (ECOTRIN) 81  MG EC tablet, Take 1 tablet (81 mg total) by mouth once daily., Disp: 90 tablet, Rfl: 0    gabapentin (NEURONTIN) 300 MG capsule, Take 1 capsule (300 mg total) by mouth 3 (three) times daily., Disp: 90 capsule, Rfl: 11    HYDROcodone-acetaminophen (NORCO) 5-325 mg per tablet, Take 1 tablet by mouth every 12 (twelve) hours as needed for Pain., Disp: 30 tablet, Rfl: 0    indomethacin (INDOCIN) 50 MG capsule, Take 1 capsule (50 mg total) by mouth 2 (two) times daily with meals., Disp: 15 capsule, Rfl: 0    levocetirizine (XYZAL) 5 MG tablet, TAKE 1 TABLET BY MOUTH EVERY DAY IN THE EVENING, Disp: 90 tablet, Rfl: 3    levothyroxine (SYNTHROID) 137 MCG Tab tablet, Take 1 tablet (137 mcg total) by mouth before breakfast., Disp: 90 tablet, Rfl: 3    methocarbamoL (ROBAXIN) 500 MG Tab, Take 1 tablet (500 mg total) by mouth 2 (two) times daily as needed (muscle spasms)., Disp: 20 tablet, Rfl: 0    montelukast (SINGULAIR) 10 mg tablet, Take 1 tablet (10 mg total) by mouth once daily., Disp: 90 tablet, Rfl: 3    olmesartan (BENICAR) 20 MG tablet, Take 1 tablet (20 mg total) by mouth once daily., Disp: 90 tablet, Rfl: 3    rosuvastatin (CRESTOR) 10 MG tablet, TAKE 1 TABLET BY MOUTH EVERY DAY, Disp: 90 tablet, Rfl: 3    colchicine, gout, (COLCRYS) 0.6 mg tablet, Take 1 tablet (0.6 mg total) by mouth 2 (two) times daily as needed (acute gout). (Patient not taking: Reported on 11/15/2023), Disp: 60 tablet, Rfl: 11    DULoxetine (CYMBALTA) 30 MG capsule, Take 1 capsule (30 mg total) by mouth once daily., Disp: 30 capsule, Rfl: 1    furosemide (LASIX) 20 MG tablet, Take 1 tablet (20 mg total) by mouth once daily. Do not take the pm dose after 3 pm, Disp: 30 tablet, Rfl: 0    Past Medical History:   Diagnosis Date    Arthritis     Asthma     C. difficile colitis 07/01/2023    in care everywhere    Cataract     Done OU    Colon polyp     Diabetic retinopathy     Gout attack     Hypertension     GERONIMO (obstructive sleep apnea)      noncompliant CPAP    Thyroid disease        Past Surgical History:   Procedure Laterality Date    CATARACT EXTRACTION W/  INTRAOCULAR LENS IMPLANT Right 06/13/2019    Dr Moses    CATARACT EXTRACTION W/  INTRAOCULAR LENS IMPLANT Left 07/18/2019    Dr Moses    COLONOSCOPY N/A 10/18/2022    Procedure: COLONOSCOPY;  Surgeon: Rahat Jarrell MD;  Location: [facility] ENDO;  Service: Endoscopy;  Laterality: N/A; Repeat colonoscopy in 3 years for surveillance    CORONARY ANGIOGRAPHY N/A 12/10/2020    Procedure: ANGIOGRAM, CORONARY ARTERY;  Surgeon: Roseanna Barr MD;  Location: [facility] CATH;  Service: Cardiology;  Laterality: N/A;    Cyst removed      From back of neck    cyst removed      from back    EPIDURAL STEROID INJECTION INTO LUMBAR SPINE N/A 12/14/2023    Procedure: Injection-steroid-epidural-lumbar     L5/S1;  Surgeon: Leander Gutierrez MD;  Location: [facility] OR;  Service: Pain Management;  Laterality: N/A;    LEFT HEART CATHETERIZATION Left 12/10/2020    Procedure: Left heart cath;  Surgeon: Roseanna Barr MD;  Location: [facility] CATH;  Service: Cardiology;  Laterality: Left;    PHACOEMULSIFICATION OF CATARACT Right 06/13/2019    Procedure: PHACOEMULSIFICATION, CATARACT;  Surgeon: Gabe Moses Jr., MD;  Location: [facility] OR;  Service: Ophthalmology;  Laterality: Right;  Right    PHACOEMULSIFICATION OF CATARACT Left 07/18/2019    Procedure: PHACOEMULSIFICATION, CATARACT;  Surgeon: Gabe Moses Jr., MD;  Location: [facility] OR;  Service: Ophthalmology;  Laterality: Left;  Left       Family History   Problem Relation Age of Onset    Cancer Mother     Alzheimer's disease Father     No Known Problems Sister     No Known Problems Sister     No Known Problems Sister     No Known Problems Maternal Grandmother     No Known Problems Maternal Grandfather     No Known Problems Paternal Grandmother     No Known Problems Paternal Grandfather     Amblyopia Neg Hx     Blindness Neg Hx     Cataracts Neg Hx     Diabetes Neg Hx      "Hypertension Neg Hx     Macular degeneration Neg Hx     Glaucoma Neg Hx     Retinal detachment Neg Hx     Strabismus Neg Hx     Stroke Neg Hx     Thyroid disease Neg Hx     Colon cancer Neg Hx     Crohn's disease Neg Hx     Esophageal cancer Neg Hx     Stomach cancer Neg Hx     Ulcerative colitis Neg Hx        Social History     Socioeconomic History    Marital status:    Tobacco Use    Smoking status: Former    Smokeless tobacco: Never    Tobacco comments:     Quit 40 yrs ago   Substance and Sexual Activity    Alcohol use: Not Currently     Comment: rarely    Drug use: Never       Review of patient's allergies indicates:   Allergen Reactions    Cleocin [clindamycin hcl] Other (See Comments)     ARF    Pregabalin        Review of Systems:  12 point review of systems is negative.    Physical Exam:  Vitals:    01/02/24 0833   BP: 116/64   Pulse: 82   Resp: 16   Weight: 110.5 kg (243 lb 9.7 oz)   Height: 5' 11" (1.803 m)   PainSc: 10-Worst pain ever   PainLoc: Back         Body mass index is 33.98 kg/m².    Gen: NAD  Psych: mood appropriate for given condition  HEENT: eyes anicteric   CV: RRR  HEENT: anicteric   Respiratory: non-labored, no signs of respiratory distress  Abd: non-distended  Skin: warm, dry and intact.  Gait: antalgic gait.       Sensory:  Intact and symmetrical to light touch in L1-S1 dermatomes bilaterally, except decreased in a nondermatomal distribution below his knees    Motor:     Right Left   L2/3 Iliacus Hip flexion  5  5   L3/4 Qudratus Femoris Knee Extension  5  5   L4/5 Tib Anterior Ankle Dorsiflexion   5  5   L5/S1 Extensor Hallicus Longus Great toe extension  5  5   S1/S2 Gastroc/Soleus Plantar Flexion  5  5      Right Left                  Patellar DTR 0 0   Achilles DTR 0 0                      Labs:  Lab Results   Component Value Date    HGBA1C 5.7 (H) 11/28/2023       Lab Results   Component Value Date    WBC 7.69 12/05/2023    HGB 13.5 (L) 12/05/2023    HCT 39.9 (L) 12/05/2023 "    MCV 91 12/05/2023     12/05/2023       Imaging:  X-ray lumbar spine with flexion-extension 09/28/2023  FINDINGS:  Mild rotatory levoconvex curvature of the lumbar spine.  Mild grade 1 anterolisthesis of L4 on L5.  No abnormal translation with flexion or extension.  Vertebral body heights are maintained without evidence of fracture or osseous destructive process.  Multilevel mild-to-moderate disc degeneration with intervertebral disc space narrowing, degenerative endplate change and marginal osteophyte formation, most pronounced at L2-3 through L4-5.  Lower lumbar facet arthropathy.  Aortoiliac calcific atherosclerosis.    MRI lumbar spine 11/10/2023  FINDINGS:  Vertebral column: Comparison plain films demonstrated a broad levocurvature of the lumbar spine.  There is suggestion of mild anterolisthesis of L4 on L5.  This is not clearly demonstrated on MRI but there is trace retrolisthesis of L2 on L3 which is exaggerated by osteophyte formation.  There is marked disc space narrowing at the T10-11 and T11-12 levels with mild-to-moderate disc space narrowing at the T12-L1 through L2-3 levels.  There is multilevel endplate osteophyte formation.  The discs are desiccated.  Baseline marrow signal is normal.     Spinal canal, conus, epidural space: The spinal canal is small on a developmental basis.  The conus terminates at the level of T12 and is grossly normal in signal intensity.  There is no abnormal epidural mass or fluid collection.     Findings by level:     On the sagittal images, in addition to marked disc space narrowing at both the T10-11 and T11-12 levels, there are shallow disc protrusions in addition a ligamentum flavum thickening.  There is moderate spinal stenosis and some degree of bilateral foraminal stenosis without acute cord compression.     T12-L1: There is disc space narrowing, diffuse disc bulge with osteophytic ridging in addition to facet joint arthropathy.  There is only borderline to  mild spinal stenosis.  There is however mild-to-moderate bilateral, left greater than right, foraminal stenosis.  L1-2: There is disc space narrowing, diffuse disc bulge with osteophytic ridging and facet joint arthropathy.  There is mild spinal stenosis with crowding of the left lateral recess.  There is mild-to-moderate left but only mild right foraminal stenosis.  L2-3: There is disc space narrowing, trace retrolisthesis of L2 on L3.  There is a diffuse disc bulge with osteophytic ridging eccentric to the right.  There is right greater than left facet joint arthropathy.  There is moderate spinal stenosis with crowding of the right lateral recess as well as severe right and moderate to severe left foraminal stenosis.  L3-4: There is a diffuse disc bulge with osteophytic ridging in addition to bilateral facet joint arthropathy resulting in moderate spinal stenosis, severe crowding of the lateral recess bilaterally as well as severe bilateral foraminal stenosis.  L4-5: There is a diffuse disc bulge with superimposed shallow broad central disc protrusion.  There is marked facet joint arthropathy with ligamentum flavum thickening.  There is severe spinal canal, bilateral lateral recess and foraminal stenosis.  L5-S1: There is left greater than right facet joint arthropathy.  There is no spinal stenosis.  There is moderate to marked left and only mild right foraminal stenosis.     Soft tissues, other: The posterior spinous muscles are mildly atrophic.  The prevertebral soft tissues appear normal.  The aorta is normal in caliber.  There is no hydronephrosis but there is a right renal cyst measuring approximately 10 mm.  This is present on concurrent CT abdomen and pelvis.      Assessment:   Problem List Items Addressed This Visit    None  Visit Diagnoses       Lumbar radiculopathy    -  Primary    Spinal stenosis of lumbar region with neurogenic claudication                  Asim Wilson is a 68 y.o. male patient  who has a past medical history of Arthritis, Asthma, C. difficile colitis, Cataract, Colon polyp, Diabetic retinopathy, Gout attack, Hypertension, GERONIMO (obstructive sleep apnea), and Thyroid disease. He presents with back pain.  He has been having lower back pain for several years and has been gradually worsening.  Today his pain is 5/10 however at times it can get up to 10/10.  He reports his pain is intermittent, aching, sharp, shooting.  He is not having any radicular pain at this time.  Pain is worse with sitting, bending, walking, coughing, lifting.  He gets some relief with gout medications such as Indocin.        1/2/24 - Asim Wilson returns to the office for follow up.  He is s/p L5/S1 ARACELI on 12/14/23 70-80% relief of his lower extremity pain for 2-3 days.  He reports after that his pain returned.  Today he reports he continues to have pain that radiates past his knees to his feet that is worse with standing and walking and relieved with rest.  His take pain can get to 10/0.  No new numbness or weakness.    - on exam he has full strength of his lower extremities.  Decreased sensation in a nondermatomal distribution below as needed  - I independently reviewed again his lumbar MRI with the patient and his wife and he has moderate central canal narrowing at L3-4 and severe central canal narrowing at L4-5.  He has multilevel bilateral facet arthropathy  - I think that his current pain is related to his severe central canal narrowing at L4-5.  He describes radicular pain and symptoms of claudication.    - his pain is currently too severe to participate in formal physical therapy.  He is not having any relief with Robaxin.  He avoids NSAIDs due to renal dysfunction.  He did not find all much relief with hydrocodone.  He has tried Lyrica in the past however caused him a rash so he discontinued it.  He has also not taking gabapentin for fear of developing the same rash.  I have prescribed him some Cymbalta to  use once a day for any neuropathic component of his pain  - his pain is limiting his mobility and interfering with his quality of life  - we will schedule for a caudal ARACELI The risks and benefits of this intervention, and alternative therapies were discussed with the patient.  The discussion of risks included infection, bleeding, need for additional procedures or surgery, nerve damage.  Questions regarding the procedure, risks, expected outcome, and possible side effects were solicited and answered to the patient's satisfaction.  Asim Katie Wilson wishes to proceed with the injection or procedure.  Written consent was obtained.  - follow up 2-3 weeks post injection.  If he fails to get relief with this then my recommendation would to be to get an evaluation with Neurosurgery      : Not applicable      This note was completed with dictation software and grammatical errors may exist.

## 2024-01-02 NOTE — TELEPHONE ENCOUNTER
Hi Patient is scheduled for a caudal ARACELI on 1/11 with Dr. Gutierrez. Provider requests patient hold ASA x 7 days prior to injection. Is patient cleared to hold ASA for scheduled epidural injection? Thanks!

## 2024-01-02 NOTE — TELEPHONE ENCOUNTER
Spoke with patient and scheduled. Per provider patient to hold ASA x 7 days. Message for clearance sent to patient's PCP. Pre op information given and follow up appointment made.

## 2024-01-02 NOTE — TELEPHONE ENCOUNTER
----- Message from Nelia Roldan sent at 1/2/2024 12:49 PM CST -----  Contact: self  Type:  Needs Medical Advice    Who Called: self  Symptoms (please be specific): needs to speak to nurse concerning getting another epidural. Pt also would like a sleep aid sent to phar to help him because he's in christine much pain.    Would the patient rather a call back or a response via [facility]? call  Best Call Back Number: [phone] (home)       [facility]/pharmacy #[id number] [address] [address]  Phone: [phone] Fax: [phone]        Additional Information: please advise and thank you.

## 2024-01-02 NOTE — TELEPHONE ENCOUNTER
Physician - Dr Gutierrez    Type of Procedure/Injection - Caudal ARACELI      Laterality - NA      Anxiolysis- Local      Need to hold medication - Yes      Aspirin for 7 days      Clearance needed - Yes      Follow up - 2-3 weeks

## 2024-01-03 DIAGNOSIS — J02.9 ACUTE SORE THROAT: ICD-10-CM

## 2024-01-03 NOTE — TELEPHONE ENCOUNTER
Refill Routing Note   Medication(s) are not appropriate for processing by [facility] for the following reason(s):        Non-participating provider:     [facility] action(s):  Route      Medication Therapy Plan:         Appointments  past 12m or future 3m with PCP    Date Provider   Last Visit   11/28/2023 Naren Guy III, PA-C   Next Visit   Visit date not found Naren Guy III, PA-C   ED visits in past 90 days: 0        Note composed:2:11 PM 01/03/2024

## 2024-01-04 RX ORDER — LEVOCETIRIZINE DIHYDROCHLORIDE 5 MG/1
5 TABLET, FILM COATED ORAL NIGHTLY
Qty: 90 TABLET | Refills: 3 | Status: SHIPPED | OUTPATIENT
Start: 2024-01-04 | End: 2024-02-01 | Stop reason: SDUPTHER

## 2024-01-11 ENCOUNTER — HOSPITAL ENCOUNTER (OUTPATIENT)
Facility: HOSPITAL | Age: 69
Discharge: HOME | End: 2024-01-11
Attending: ANESTHESIOLOGY | Admitting: ANESTHESIOLOGY
Payer: MEDICARE

## 2024-01-11 ENCOUNTER — HOSPITAL ENCOUNTER (OUTPATIENT)
Dept: RADIOLOGY | Facility: HOSPITAL | Age: 69
Discharge: HOME | End: 2024-01-11
Attending: ANESTHESIOLOGY | Admitting: ANESTHESIOLOGY
Payer: MEDICARE

## 2024-01-11 VITALS
HEIGHT: 71 IN | OXYGEN SATURATION: 98 % | RESPIRATION RATE: 16 BRPM | TEMPERATURE: 97 F | DIASTOLIC BLOOD PRESSURE: 77 MMHG | SYSTOLIC BLOOD PRESSURE: 166 MMHG | HEART RATE: 72 BPM | WEIGHT: 243 LBS | BODY MASS INDEX: 34.02 KG/M2

## 2024-01-11 DIAGNOSIS — M54.16 LUMBAR RADICULOPATHY: Primary | ICD-10-CM

## 2024-01-11 DIAGNOSIS — M54.50 LOWER BACK PAIN: ICD-10-CM

## 2024-01-11 DIAGNOSIS — Z00.00 ENCOUNTER FOR MEDICARE ANNUAL WELLNESS EXAM: ICD-10-CM

## 2024-01-11 PROCEDURE — 62323 NJX INTERLAMINAR LMBR/SAC: CPT | Mod: PO | Performed by: ANESTHESIOLOGY

## 2024-01-11 PROCEDURE — 76000 FLUOROSCOPY <1 HR PHYS/QHP: CPT | Mod: TC,PO

## 2024-01-11 PROCEDURE — 62323 NJX INTERLAMINAR LMBR/SAC: CPT | Mod: ,,, | Performed by: ANESTHESIOLOGY

## 2024-01-11 PROCEDURE — 25000003 PHARM REV CODE 250: Mod: PO | Performed by: ANESTHESIOLOGY

## 2024-01-11 PROCEDURE — 63600175 PHARM REV CODE 636 W HCPCS: Mod: PO | Performed by: ANESTHESIOLOGY

## 2024-01-11 PROCEDURE — 25500020 PHARM REV CODE 255: Mod: PO | Performed by: ANESTHESIOLOGY

## 2024-01-11 RX ORDER — METHYLPREDNISOLONE ACETATE 80 MG/ML
INJECTION, SUSPENSION INTRA-ARTICULAR; INTRALESIONAL; INTRAMUSCULAR; SOFT TISSUE
Status: DISCONTINUED | OUTPATIENT
Start: 2024-01-11 | End: 2024-01-11 | Stop reason: HOSPADM

## 2024-01-11 RX ORDER — BUPIVACAINE HYDROCHLORIDE 2.5 MG/ML
INJECTION, SOLUTION EPIDURAL; INFILTRATION; INTRACAUDAL
Status: DISCONTINUED | OUTPATIENT
Start: 2024-01-11 | End: 2024-01-11 | Stop reason: HOSPADM

## 2024-01-11 RX ORDER — LIDOCAINE HYDROCHLORIDE 10 MG/ML
INJECTION, SOLUTION EPIDURAL; INFILTRATION; INTRACAUDAL; PERINEURAL
Status: DISCONTINUED | OUTPATIENT
Start: 2024-01-11 | End: 2024-01-11 | Stop reason: HOSPADM

## 2024-01-11 RX ORDER — ALPRAZOLAM 0.5 MG/1
1 TABLET, ORALLY DISINTEGRATING ORAL ONCE AS NEEDED
Status: COMPLETED | OUTPATIENT
Start: 2024-01-11 | End: 2024-01-11

## 2024-01-11 RX ADMIN — ALPRAZOLAM 1 MG: 0.5 TABLET, ORALLY DISINTEGRATING ORAL at 12:01

## 2024-01-11 NOTE — OP NOTE
Procedure Note    Procedure Date: 1/11/2024    Procedure Performed:  Caudal epidural steroid injection under fluoroscopy    Indications:  Asim Wilson presents with lumbar radiculitis/radiculopathy secondary to disc herniation, osteophyte/osteophyte complexes, and/or severe degenerative disc disease producing foraminal or central spinal stenosis.  The pain has been present for at least 4 weeks and the patient has failed to respond to noninvasive conservative care.  Pain rated by NRS at baseline prior to intervention is 6/10.  Their radiculitis/radiculopathy and/or neurogenic claudication is severe enough to greatly impact their quality of life or function.     Pre-op diagnosis: Lumbar radiculitis/radiculopathy    Post-op diagnosis: same    Physician: Leander Gutierrez MD    IV anxiolysis medications: none    Medications injected: Depomedrol 80mg, 2ml Bupivacaine 0.25%, 6 mL sterile, preservative-free normal saline.    Local anesthetic used: 1% Lidocaine, 1 ml    Estimated Blood Loss: none    Complications:  none    Technique:   The patient was interviewed in the holding area and Risks/Benefits were discussed, including, but not limited to, the possibility of new or different pain, bleeding or infection.   All questions were answered.  The patient understood and accepted risks.  Consent was reviewed.  A time-out was taken to identify patient and procedure side prior to starting the procedure.  After the patient was placed in prone position, the patient was prepped and draped in the usual sterile fashion using ChloraPrep x3 and sterile towels.  Appropriate anatomic landmarks were determined by identifying the sacral hiatus in the lateral fluoroscopic view.  Local anesthetic was given via a 25g 1.5 inch needle by raising a wheal and infiltrating down to the periosteum.  A 25 gauge 3.5 inch needle was introduced thru the sacral hiatus.  Omnipaque was injected to confirm placement in the appropriate area and that there  was no vascular uptake.  The medication was then injected slowly.  The patient tolerated the procedure well.  The patient was monitored after the procedure.  Patient was given post procedure and discharge instructions to follow at home.  The patient was discharged in a stable condition

## 2024-01-11 NOTE — INTERVAL H&P NOTE
The patient has been examined and the H&P has been reviewed:    I concur with the findings and no changes have occurred since H&P was written.  He has held his aspirin appropriately.         There are no hospital problems to display for this patient.

## 2024-01-11 NOTE — DISCHARGE SUMMARY
[facility]  Discharge Note  Short Stay    Admit Date: 1/11/2024    Discharge Date: 1/11/2024    Attending Physician: Leander Gutierrez     Discharge Provider: Leander Gutierrez    Diagnoses:  There are no hospital problems to display for this patient.      Discharged Condition: Good    Final Diagnoses: Lumbar radiculopathy [M54.16]    Disposition: Home or Self Care    Hospital Course: No complications, uneventful    Outcome of Hospitalization, Treatment, Procedure, or Surgery:  Patient was admitted for outpatient interventional pain management procedure. The patient tolerated the procedure well with no complications.    Follow up/Patient Instructions:  Follow up as scheduled in Pain Management office in 2-3 weeks.  Patient has received instructions and follow up date and time.    Medications:  Continue previous medications, except restart aspirin in 24 hours    Discharge Procedure Orders   Notify your health care provider if you experience any of the following:  temperature >100.4     Notify your health care provider if you experience any of the following:  persistent nausea and vomiting or diarrhea     Notify your health care provider if you experience any of the following:  severe uncontrolled pain     Notify your health care provider if you experience any of the following:  redness, tenderness, or signs of infection (pain, swelling, redness, odor or green/yellow discharge around incision site)     Notify your health care provider if you experience any of the following:  difficulty breathing or increased cough     Notify your health care provider if you experience any of the following:  severe persistent headache     Notify your health care provider if you experience any of the following:  worsening rash     Notify your health care provider if you experience any of the following:  persistent dizziness, light-headedness, or visual disturbances     Notify your health care provider if you experience any of the  following:  increased confusion or weakness     Activity as tolerated

## 2024-01-12 ENCOUNTER — TELEPHONE (OUTPATIENT)
Dept: CARDIOLOGY | Facility: CLINIC | Age: 69
End: 2024-01-12
Payer: MEDICARE

## 2024-01-12 NOTE — TELEPHONE ENCOUNTER
----- Message from Katherine Bro sent at 1/12/2024 10:21 AM CST -----  Type:  Sooner Appointment Request    Caller is requesting a sooner appointment.  Caller declined first available appointment listed below.  Caller will not accept being placed on the waitlist and is requesting a message be sent to doctor.    Name of Caller:  pt wife, Gabi  When is the first available appointment?  None in a week--pt need to be seen in a week for surgery--please call and advise  Symptoms:  medical clearance  Would the patient rather a call back or a response via [facility]? call  Best Call Back Number: [phone]    Additional Information:  thank you

## 2024-01-17 ENCOUNTER — TELEPHONE (OUTPATIENT)
Dept: CARDIOLOGY | Facility: CLINIC | Age: 69
End: 2024-01-17
Payer: MEDICARE

## 2024-01-17 NOTE — TELEPHONE ENCOUNTER
Pt having L3-5 fusion on 02/20/24. Pt taking aspirin. General anesthesia. Please advise.     [facility]   P- [phone]  F- [phone]

## 2024-01-19 ENCOUNTER — OFFICE VISIT (OUTPATIENT)
Dept: CARDIOLOGY | Facility: CLINIC | Age: 69
End: 2024-01-19
Payer: MEDICARE

## 2024-01-19 VITALS
SYSTOLIC BLOOD PRESSURE: 102 MMHG | HEART RATE: 67 BPM | WEIGHT: 245.38 LBS | DIASTOLIC BLOOD PRESSURE: 60 MMHG | BODY MASS INDEX: 34.35 KG/M2 | HEIGHT: 71 IN

## 2024-01-19 DIAGNOSIS — E66.9 OBESITY, CLASS I, BMI 30-34.9: Chronic | ICD-10-CM

## 2024-01-19 DIAGNOSIS — I25.10 CAD IN NATIVE ARTERY: Primary | Chronic | ICD-10-CM

## 2024-01-19 DIAGNOSIS — I10 PRIMARY HYPERTENSION: ICD-10-CM

## 2024-01-19 DIAGNOSIS — I34.81 MITRAL ANNULAR CALCIFICATION: Chronic | ICD-10-CM

## 2024-01-19 DIAGNOSIS — I45.2 BIFASCICULAR BLOCK: ICD-10-CM

## 2024-01-19 DIAGNOSIS — E78.00 PURE HYPERCHOLESTEROLEMIA: Chronic | ICD-10-CM

## 2024-01-19 DIAGNOSIS — I70.0 AORTIC ATHEROSCLEROSIS: ICD-10-CM

## 2024-01-19 DIAGNOSIS — Z01.810 ENCOUNTER FOR PRE-OPERATIVE CARDIOVASCULAR CLEARANCE: ICD-10-CM

## 2024-01-19 DIAGNOSIS — N18.31 CHRONIC KIDNEY DISEASE, STAGE 3A: Chronic | ICD-10-CM

## 2024-01-19 PROBLEM — E66.811 OBESITY, CLASS I, BMI 30-34.9: Status: ACTIVE | Noted: 2021-01-12

## 2024-01-19 PROBLEM — I20.9 ANGINA PECTORIS, UNSPECIFIED: Status: RESOLVED | Noted: 2021-04-03 | Resolved: 2024-01-19

## 2024-01-19 PROCEDURE — 99999 PR PBB SHADOW E&M-EST. PATIENT-LVL III: CPT | Mod: PBBFAC,,, | Performed by: INTERNAL MEDICINE

## 2024-01-19 PROCEDURE — 93005 ELECTROCARDIOGRAM TRACING: CPT | Mod: PO

## 2024-01-19 PROCEDURE — 93010 ELECTROCARDIOGRAM REPORT: CPT | Mod: S$GLB,,, | Performed by: INTERNAL MEDICINE

## 2024-01-19 PROCEDURE — 99204 OFFICE O/P NEW MOD 45 MIN: CPT | Mod: 25,S$GLB,, | Performed by: INTERNAL MEDICINE

## 2024-01-19 RX ORDER — OXYCODONE AND ACETAMINOPHEN 10; 325 MG/1; MG/1
1 TABLET ORAL EVERY 6 HOURS PRN
COMMUNITY
Start: 2024-01-08
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
## 2024-01-23 ENCOUNTER — PATIENT MESSAGE (OUTPATIENT)
Dept: RADIOLOGY | Facility: HOSPITAL | Age: 69
End: 2024-01-23
Payer: MEDICARE

## 2024-01-25 ENCOUNTER — HOSPITAL ENCOUNTER (OUTPATIENT)
Dept: CARDIOLOGY | Facility: HOSPITAL | Age: 69
Discharge: HOME | End: 2024-01-25
Attending: INTERNAL MEDICINE
Payer: MEDICARE

## 2024-01-25 ENCOUNTER — HOSPITAL ENCOUNTER (OUTPATIENT)
Dept: RADIOLOGY | Facility: HOSPITAL | Age: 69
Discharge: HOME | End: 2024-01-25
Attending: INTERNAL MEDICINE
Payer: MEDICARE

## 2024-01-25 VITALS — HEIGHT: 71 IN | BODY MASS INDEX: 34.3 KG/M2 | WEIGHT: 245 LBS

## 2024-01-25 DIAGNOSIS — I25.10 CAD IN NATIVE ARTERY: Chronic | ICD-10-CM

## 2024-01-25 DIAGNOSIS — Z01.810 ENCOUNTER FOR PRE-OPERATIVE CARDIOVASCULAR CLEARANCE: ICD-10-CM

## 2024-01-25 DIAGNOSIS — I45.2 BIFASCICULAR BLOCK: ICD-10-CM

## 2024-01-25 LAB
CV PHARM DOSE: 0.4 MG
CV STRESS BASE HR: 61 BPM
DIASTOLIC BLOOD PRESSURE: 62 MMHG
NUC REST EJECTION FRACTION: 76
OHS CV CPX 1 MINUTE RECOVERY HEART RATE: 81 BPM
OHS CV CPX 85 PERCENT MAX PREDICTED HEART RATE MALE: 128
OHS CV CPX MAX PREDICTED HEART RATE: 150
OHS CV CPX PATIENT IS FEMALE: 0
OHS CV CPX PATIENT IS MALE: 1
OHS CV CPX PEAK DIASTOLIC BLOOD PRESSURE: 62 MMHG
OHS CV CPX PEAK HEAR RATE: 88 BPM
OHS CV CPX PEAK RATE PRESSURE PRODUCT: NORMAL
OHS CV CPX PEAK SYSTOLIC BLOOD PRESSURE: 124 MMHG
OHS CV CPX PERCENT MAX PREDICTED HEART RATE ACHIEVED: 59
OHS CV CPX RATE PRESSURE PRODUCT PRESENTING: 7564
OHS CV PHARM TIME: 1416 MIN
SYSTOLIC BLOOD PRESSURE: 124 MMHG

## 2024-01-25 PROCEDURE — 78452 HT MUSCLE IMAGE SPECT MULT: CPT | Mod: 26,,, | Performed by: INTERNAL MEDICINE

## 2024-01-25 PROCEDURE — 93018 CV STRESS TEST I&R ONLY: CPT | Mod: ,,, | Performed by: INTERNAL MEDICINE

## 2024-01-25 PROCEDURE — 93016 CV STRESS TEST SUPVJ ONLY: CPT | Mod: ,,, | Performed by: INTERNAL MEDICINE

## 2024-01-25 PROCEDURE — 93017 CV STRESS TEST TRACING ONLY: CPT | Mod: PO

## 2024-01-25 PROCEDURE — 63600175 PHARM REV CODE 636 W HCPCS: Mod: PO | Performed by: INTERNAL MEDICINE

## 2024-01-25 PROCEDURE — A9502 TC99M TETROFOSMIN: HCPCS | Mod: PO

## 2024-01-25 RX ORDER — REGADENOSON 0.08 MG/ML
0.4 INJECTION, SOLUTION INTRAVENOUS
Status: COMPLETED | OUTPATIENT
Start: 2024-01-25 | End: 2024-01-25

## 2024-01-25 RX ADMIN — REGADENOSON 0.4 MG: 0.08 INJECTION, SOLUTION INTRAVENOUS at 02:01

## 2024-01-29 ENCOUNTER — PATIENT MESSAGE (OUTPATIENT)
Dept: ADMINISTRATIVE | Facility: HOSPITAL | Age: 69
End: 2024-01-29
Payer: MEDICARE

## 2024-01-30 ENCOUNTER — TELEPHONE (OUTPATIENT)
Dept: CARDIOLOGY | Facility: CLINIC | Age: 69
End: 2024-01-30
Payer: MEDICARE

## 2024-01-30 NOTE — TELEPHONE ENCOUNTER
----- Message from Lizabeth Restrepo sent at 1/30/2024  1:34 PM CST -----  Contact: self  Type: Needs Medical Advice  Who Called:  pt  Best Call Back Number: [phone]   Additional Information: pt would like to know if the results be forwarded to the surgeon.please advise

## 2024-01-30 NOTE — TELEPHONE ENCOUNTER
Christelle   L3-5 ALIF/PSIF/lanil on 2/20/24  General Anesthesia   Taking asa  No Known Cardiac Implants/Devices  Seeking ECHO/Stress/EKG Results and official Cardiac Clearance  [phone] phone  [phone] fax

## 2024-01-31 ENCOUNTER — TELEPHONE (OUTPATIENT)
Dept: CARDIOLOGY | Facility: CLINIC | Age: 69
End: 2024-01-31
Payer: MEDICARE

## 2024-02-01 DIAGNOSIS — I10 PRIMARY HYPERTENSION: ICD-10-CM

## 2024-02-01 DIAGNOSIS — E03.9 HYPOTHYROIDISM, UNSPECIFIED TYPE: ICD-10-CM

## 2024-02-01 DIAGNOSIS — J02.9 ACUTE SORE THROAT: ICD-10-CM

## 2024-02-01 DIAGNOSIS — E78.5 HYPERLIPIDEMIA, UNSPECIFIED HYPERLIPIDEMIA TYPE: ICD-10-CM

## 2024-02-01 RX ORDER — MONTELUKAST SODIUM 10 MG/1
10 TABLET ORAL DAILY
Qty: 90 TABLET | Refills: 3 | Status: SHIPPED | OUTPATIENT
Start: 2024-02-01 | End: 2024-02-12

## 2024-02-01 RX ORDER — ALLOPURINOL 300 MG/1
300 TABLET ORAL DAILY PRN
Qty: 90 TABLET | Refills: 3 | Status: SHIPPED | OUTPATIENT
Start: 2024-02-01

## 2024-02-01 RX ORDER — LEVOCETIRIZINE DIHYDROCHLORIDE 5 MG/1
5 TABLET, FILM COATED ORAL NIGHTLY
Qty: 90 TABLET | Refills: 3 | Status: SHIPPED | OUTPATIENT
Start: 2024-02-01 | End: 2024-02-12

## 2024-02-01 RX ORDER — LEVOTHYROXINE SODIUM 137 UG/1
137 TABLET ORAL
Qty: 90 TABLET | Refills: 3 | Status: SHIPPED | OUTPATIENT
Start: 2024-02-01

## 2024-02-01 RX ORDER — OLMESARTAN MEDOXOMIL 20 MG/1
20 TABLET ORAL DAILY
Qty: 90 TABLET | Refills: 3 | Status: SHIPPED | OUTPATIENT
Start: 2024-02-01 | End: 2024-02-12

## 2024-02-01 RX ORDER — ASPIRIN 81 MG/1
81 TABLET ORAL DAILY
Qty: 90 TABLET | Refills: 0 | Status: SHIPPED | OUTPATIENT
Start: 2024-02-01 | End: 2024-04-29

## 2024-02-01 NOTE — TELEPHONE ENCOUNTER
----- Message from Cynthia Jefferson sent at 2024 12:42 PM CST -----  Regardin Refills, advice  Contact: Wife [phone]  Type:  RX Refill Request    Who Called:  wife / Gabi    Refill or New Rx:  refill    RX Name and Strength:  levothyroxine (SYNTHROID) 137 MCG Tab tablet 90 tablet 3 1/10/2023 2024   Sig - Route: Take 1 tablet (137 mcg total) by mouth before breakfast. - Oral   Sent to pharmacy as: levothyroxine (SYNTHROID) 137 MCG Tab tablet   E-Prescribing Status: Receipt confirmed by pharmacy (1/10/2023  8:27 AM CST)      Disp Refills Start End   olmesartan (BENICAR) 20 MG tablet 90 tablet 3 10/10/2023 10/9/2024   Sig - Route: Take 1 tablet (20 mg total) by mouth once daily. - Oral   Sent to pharmacy as: olmesartan (BENICAR) 20 MG tablet   Notes to Pharmacy: .   E-Prescribing Status: Receipt confirmed by pharmacy (10/10/2023  2:15 PM CDT)     allopurinoL (ZYLOPRIM) 300 MG tablet 90 tablet 3 2023 -   Sig - Route: TAKE 1 TABLET (300 MG TOTAL) BY MOUTH DAILY AS NEEDED. - Oral   Sent to pharmacy as: allopurinoL (ZYLOPRIM) 300 MG tablet   E-Prescribing Status: Receipt confirmed by pharmacy (2023  9:27 AM CST)     aspirin (ECOTRIN) 81 MG EC tablet 90 tablet 0 11/10/2022 -   Sig - Route: Take 1 tablet (81 mg total) by mouth once daily. - Oral   Sent to pharmacy as: aspirin (ECOTRIN) 81 MG EC tablet   E-Prescribing Status: Receipt confirmed by pharmacy (11/10/2022  1:16 PM CST)     montelukast (SINGULAIR) 10 mg tablet 90 tablet 3 2023 -   Sig - Route: Take 1 tablet (10 mg total) by mouth once daily. - Oral   Sent to pharmacy as: montelukast (SINGULAIR) 10 mg tablet   E-Prescribing Status: Receipt confirmed by pharmacy (2023  9:27 AM CST)     rosuvastatin (CRESTOR) 10 MG tablet 90 tablet 3 3/27/2023 -   Sig: TAKE 1 TABLET BY MOUTH EVERY DAY   Sent to pharmacy as: rosuvastatin (CRESTOR) 10 MG tablet   E-Prescribing Status: Receipt confirmed by pharmacy (3/27/2023 10:32 AM  CDT)       levocetirizine (XYZAL) 5 MG tablet 90 tablet 3 1/4/2024 -   Sig - Route: Take 1 tablet (5 mg total) by mouth every evening. - Oral   Sent to pharmacy as: levocetirizine (XYZAL) 5 MG tablet   E-Prescribing Status: Receipt confirmed by pharmacy (1/4/2024  6:56 AM CST)       How is the patient currently taking it? (ex. 1XDay):  see above    Is this a 30 day or 90 day RX:  see above    Preferred Pharmacy with phone number:    [facility]/pharmacy #[id number] - [address] [address]  Phone: [phone] Fax: [phone]      Local or Mail Order:  local    Ordering Provider:  Brooks Oliver Call Back Number:  [phone] / [phone]    Additional Information:  MALISSA patient is having spinal fushion on 2/20 thru [facility], needs refills sent over before surgery, out of some of these Rxs. 6 total refills. Patient wants to also inform you that he DID NOT get to take levothyroxine (SYNTHROID) this morning, he was out. Please call to advise.

## 2024-02-02 ENCOUNTER — NURSE TRIAGE (OUTPATIENT)
Dept: ADMINISTRATIVE | Facility: CLINIC | Age: 69
End: 2024-02-02
Payer: MEDICARE

## 2024-02-02 NOTE — TELEPHONE ENCOUNTER
"Pt had 4-5 bouts of diarrhea today and wife states he is shaking but has now stopped.   Pt states bowel movement was "black".  Also pts concern during the triage call is joint pain, hx of gout.  Care advice states to go to the ED now.  Family/pt verbally understood, all questions answered, advised to call back for any worsening symptoms or further needs.     Reason for Disposition   Black or tarry bowel movements  (Exception: Chronic-unchanged black-grey BMs AND is taking iron pills or Pepto-Bismol.)    Additional Information   Negative: Shock suspected (e.g., cold/pale/clammy skin, too weak to stand, low BP, rapid pulse)   Negative: Difficult to awaken or acting confused (e.g., disoriented, slurred speech)   Negative: Sounds like a life-threatening emergency to the triager    Protocols used: Diarrhea-A-AH    "

## 2024-02-02 NOTE — TELEPHONE ENCOUNTER
Followed up with pt spouse regarding ER visit,spouse states that pt is a little better, picked up medication refills from pharmacy. Told spouse to call 911 if pt symptoms worsen, understanding voiced.  No other concerns were voiced.

## 2024-02-08 ENCOUNTER — TELEPHONE (OUTPATIENT)
Dept: PRIMARY CARE CLINIC | Facility: CLINIC | Age: 69
End: 2024-02-08
Payer: MEDICARE

## 2024-02-08 NOTE — TELEPHONE ENCOUNTER
----- Message from Della Arredondo sent at 2/8/2024  2:25 PM CST -----  Type:  Patient Returning Call    Who Called:pts wife Gabi      Who Left Message for Patient: Ruma    Does the patient know what this is regarding?:yes    Would the patient rather a call back or a response via [facility]? Call back    Best Call Back Number:[phone]   [phone] Please call both numbers if no answer on one    Additional Information: Mrs Ashley said she is home and available to speak with you if you can call her back     Please call Back to advise. Thanks!

## 2024-02-09 RX ORDER — FUROSEMIDE 20 MG/1
20 TABLET ORAL DAILY
Qty: 90 TABLET | Refills: 1 | Status: SHIPPED | OUTPATIENT
Start: 2024-02-09 | End: 2024-02-12

## 2024-02-09 NOTE — TELEPHONE ENCOUNTER
----- Message from Cherie Angelo sent at 2/9/2024  3:19 PM CST -----  Contact: Gabi [phone]  Type: Needs Medical Advice  Who Called:  Pts wife Gabi Oliver Call Back Number: [phone]    Additional Information: Pt was in [facility] and was advised by Dr Ayesha Hill to have Dr Rojo send orders for two iron infusions. Gabi stated pt want to do these at [facility]. Pls call back and advise

## 2024-02-09 NOTE — TELEPHONE ENCOUNTER
Patient was admitted to ed and had iron infusions while in the hospital. Patient needs orders for outpatient infusions. Patient would like to do infusions at [place] if possible.I am sending this message to .

## 2024-02-12 ENCOUNTER — OFFICE VISIT (OUTPATIENT)
Dept: PRIMARY CARE CLINIC | Facility: CLINIC | Age: 69
End: 2024-02-12
Payer: MEDICARE

## 2024-02-12 ENCOUNTER — TELEPHONE (OUTPATIENT)
Dept: PRIMARY CARE CLINIC | Facility: CLINIC | Age: 69
End: 2024-02-12

## 2024-02-12 ENCOUNTER — TELEPHONE (OUTPATIENT)
Dept: CARDIOLOGY | Facility: CLINIC | Age: 69
End: 2024-02-12
Payer: MEDICARE

## 2024-02-12 VITALS
SYSTOLIC BLOOD PRESSURE: 104 MMHG | OXYGEN SATURATION: 95 % | DIASTOLIC BLOOD PRESSURE: 60 MMHG | HEIGHT: 71 IN | HEART RATE: 66 BPM | WEIGHT: 234.81 LBS | BODY MASS INDEX: 32.87 KG/M2

## 2024-02-12 DIAGNOSIS — E79.0 HYPERURICEMIA: Primary | ICD-10-CM

## 2024-02-12 DIAGNOSIS — E11.69 TYPE 2 DIABETES MELLITUS WITH OTHER SPECIFIED COMPLICATION, WITHOUT LONG-TERM CURRENT USE OF INSULIN: ICD-10-CM

## 2024-02-12 PROBLEM — R73.03 PREDIABETES: Status: RESOLVED | Noted: 2020-10-13 | Resolved: 2024-02-12

## 2024-02-12 PROBLEM — R94.39 ABNORMAL NUCLEAR STRESS TEST: Status: RESOLVED | Noted: 2020-12-03 | Resolved: 2024-02-12

## 2024-02-12 PROCEDURE — 99214 OFFICE O/P EST MOD 30 MIN: CPT | Mod: S$GLB,,, | Performed by: PHYSICIAN ASSISTANT

## 2024-02-12 RX ORDER — ROSUVASTATIN CALCIUM 10 MG/1
10 TABLET, COATED ORAL DAILY
Qty: 90 TABLET | Refills: 3 | Status: SHIPPED | OUTPATIENT
Start: 2024-02-12 | End: 2024-02-15 | Stop reason: SDUPTHER

## 2024-02-12 RX ORDER — OLMESARTAN MEDOXOMIL 20 MG/1
10 TABLET ORAL DAILY
Qty: 45 TABLET | Refills: 3
Start: 2024-02-12 | End: 2024-02-12

## 2024-02-12 NOTE — TELEPHONE ENCOUNTER
----- Message from Margie Patel sent at 2/12/2024  2:51 PM CST -----  Contact: self  Type:  RX Refill Request    Who Called: Pt  Refill or New Rx: Refill   RX Name and Strength:olmesartan (BENICAR) 20 MG tablet  Preferred Pharmacy with phone number:  [facility]/pharmacy #[id number] [address] [address]  Phone: [phone] Fax: [phone]  Local or Mail Order:  Ordering Provider: Dr. Guy  Would the patient rather a call back or a response via [facility]?  Call   Best Call Back Number: [phone]    Additional Information:  Pt would like to call in a 10 mg called in to inform the ofc that cutting the pill in half was UNSUCCESSFUL, the pill broke into many different pieces but not in half... Would like a Rx sent in for 10 mg instead... Please call to advise... Thank you...

## 2024-02-12 NOTE — TELEPHONE ENCOUNTER
Lvm yudy Godinez at [facility] to clarify what she needs in addition to clearance that has already been sent.    Dr Aguilar  [phone]

## 2024-02-12 NOTE — TELEPHONE ENCOUNTER
----- Message from Lisa Jennings sent at 2/12/2024  2:12 PM CST -----  Regarding: Call back  Type:  Needs Medical Advice    Who Called: Pt Wife    Would the patient rather a call back or a response via [facility]? Call back    Best Call Back Number: [phone]    Additional Information: georgiana will be having a Sx done and he is having problem getting the type of clearance he needs from the Dr. Wife would like to speak with the nurse. Thank you

## 2024-02-12 NOTE — PROGRESS NOTES
Subjective     Patient ID: Asim Wilson is a 68 y.o. male.    Chief Complaint: Follow-up (3 month f/u)    Patient is a 67 yo male who was admitted to [facility] recently for severe gout and renal failure. He was placed on a diet and his been losing weight. His gout is better managed and has been feeling better.     Patient Active Problem List:     Age-related nuclear cataract of right eye     Age-related nuclear cataract of left eye     Prediabetes     Gout     Hyperlipidemia     Hypothyroidism     Tinnitus of both ears     Hypertension     RBBB (right bundle branch block)     Abnormal nuclear stress test     CAD in native artery - mild non-obstructive by cath 12/2020     Dyslipidemia     Obesity, Class I, BMI 30-34.9     Hyperuricemia     Type 2 diabetes mellitus, without long-term current use of insulin     Type 2 DM with CKD stage 3 and hypertension     Chronic kidney disease, stage 3a     Aortic atherosclerosis     Mitral annular calcification     Encounter for pre-operative cardiovascular clearance    He had a recent stress test which was normal. He also is scheduled to spine surgery in the next several weeks. He was told by nephrology that he needed iron infusions.     Past Medical History:  No date: Arthritis  No date: Asthma  07/01/2023: C. difficile colitis      Comment:  in care everywhere  No date: Cataract      Comment:  Done OU  No date: Colon polyp  No date: Diabetic retinopathy  No date: Gout attack  No date: Hypertension  No date: GERONIMO (obstructive sleep apnea)      Comment:  noncompliant CPAP  No date: Thyroid disease      Follow-up  Pertinent negatives include no abdominal pain, chest pain, coughing, fatigue, fever or nausea.     Review of Systems   Constitutional:  Negative for activity change, fatigue and fever.   HENT: Negative.     Respiratory:  Negative for cough, chest tightness, shortness of breath and wheezing.    Cardiovascular:  Negative for chest pain, palpitations and leg  swelling.   Gastrointestinal:  Negative for abdominal pain, blood in stool, diarrhea and nausea.          Objective     Physical Exam  Vitals reviewed: BP is low today.   Constitutional:       General: He is not in acute distress.     Appearance: Normal appearance. He is not ill-appearing, toxic-appearing or diaphoretic.   Cardiovascular:      Rate and Rhythm: Normal rate and regular rhythm.      Pulses: Normal pulses.      Heart sounds: No murmur heard.     No friction rub. No gallop.   Pulmonary:      Effort: Pulmonary effort is normal. No respiratory distress.      Breath sounds: Normal breath sounds. No stridor. No wheezing, rhonchi or rales.   Chest:      Chest wall: No tenderness.   Musculoskeletal:      Cervical back: No rigidity or tenderness.   Lymphadenopathy:      Cervical: No cervical adenopathy.            Assessment and Plan     1. Hyperuricemia  The current medical regimen is effective;  continue present plan and medications.     2. Type 2 diabetes mellitus with other specified complication, without long-term current use of insulin  The current medical regimen is effective;  continue present plan and medications.     Other orders  -    decrease to  olmesartan (BENICAR) 20 MG tablet; Take 0.5 tablets (10 mg total) by mouth once daily.  Dispense: 45 tablet; Refill: 3        I spent 30 minutes on this encounter, time includes face-to-face, chart review, documentation, test review and orders.         Fu 3 months  Recommend eye exam

## 2024-02-12 NOTE — TELEPHONE ENCOUNTER
Pt would like to call in a 10 mg called in to inform the ofc that cutting the pill in half was UNSUCCESSFUL, the pill broke into many different pieces but not in half.    Please advise

## 2024-02-14 ENCOUNTER — TELEPHONE (OUTPATIENT)
Dept: NEPHROLOGY | Facility: CLINIC | Age: 69
End: 2024-02-14

## 2024-02-14 ENCOUNTER — TELEPHONE (OUTPATIENT)
Dept: FAMILY MEDICINE | Facility: CLINIC | Age: 69
End: 2024-02-14

## 2024-02-14 NOTE — TELEPHONE ENCOUNTER
Spoke to pt, he is wanting to get off of crestor.  States he wants to take as little medication as possible.  He is asking if it is absolutely necessary that he take this medication.      He was told that his olmesartan 10 MG was sent to the [facility] but the pharmacy is telling him they haven't received it.

## 2024-02-14 NOTE — TELEPHONE ENCOUNTER
HE was in hospital  at [facility].     Dr Hill wanted him to have 2 Fe IV infusions.     He has a spinal procedure in the beginning of March.

## 2024-02-14 NOTE — TELEPHONE ENCOUNTER
----- Message from Sandra Main sent at 2/14/2024 11:10 AM CST -----  Contact: Pt's wife  Type: Needs Medical Advice    Who Called:  Patient's wife  What is this regarding?:  He was just discharged and he needs to have 2 outpt iron IV s ASAP bc he is scheduled for back spinal infusion beginning in 03/24.   Best Call Back Number:  [phone]  Additional Information:  Please call the patient's wife back at the phone number listed above to advise. Thank you!

## 2024-02-15 ENCOUNTER — OFFICE VISIT (OUTPATIENT)
Dept: NEPHROLOGY | Facility: CLINIC | Age: 69
End: 2024-02-15
Payer: MEDICARE

## 2024-02-15 VITALS
WEIGHT: 234.81 LBS | HEIGHT: 71 IN | DIASTOLIC BLOOD PRESSURE: 50 MMHG | HEART RATE: 73 BPM | OXYGEN SATURATION: 95 % | BODY MASS INDEX: 32.87 KG/M2 | SYSTOLIC BLOOD PRESSURE: 90 MMHG

## 2024-02-15 DIAGNOSIS — E83.42 HYPOMAGNESEMIA: ICD-10-CM

## 2024-02-15 DIAGNOSIS — N18.31 STAGE 3A CHRONIC KIDNEY DISEASE: Primary | ICD-10-CM

## 2024-02-15 DIAGNOSIS — I10 PRIMARY HYPERTENSION: ICD-10-CM

## 2024-02-15 PROCEDURE — 99214 OFFICE O/P EST MOD 30 MIN: CPT | Mod: S$GLB,,, | Performed by: INTERNAL MEDICINE

## 2024-02-15 PROCEDURE — 99999 PR PBB SHADOW E&M-EST. PATIENT-LVL III: CPT | Mod: PBBFAC,,, | Performed by: INTERNAL MEDICINE

## 2024-02-15 RX ORDER — ROSUVASTATIN CALCIUM 10 MG/1
10 TABLET, COATED ORAL DAILY
Qty: 90 TABLET | Refills: 3 | Status: SHIPPED | OUTPATIENT
Start: 2024-02-15

## 2024-02-15 RX ORDER — OLMESARTAN MEDOXOMIL 5 MG/1
10 TABLET ORAL DAILY
COMMUNITY
End: 2024-02-16 | Stop reason: SDUPTHER
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
## 2024-02-15 NOTE — TELEPHONE ENCOUNTER
I gave him the benefits of staying on it at his clinic visit. It decreases the chance of a future stroke or heart attack. He can stop it if he wants. He just needs to understand the risk.

## 2024-02-16 RX ORDER — OLMESARTAN MEDOXOMIL 5 MG/1
10 TABLET ORAL DAILY
Qty: 60 TABLET | Refills: 11 | Status: SHIPPED | OUTPATIENT
Start: 2024-02-16

## 2024-02-16 NOTE — TELEPHONE ENCOUNTER
----- Message from Emely Angel sent at 2/16/2024  2:01 PM CST -----  Type:  RX Refill Request    Who Called:  Pt's wife Gabi    Refill or New Rx:  refill    RX Name and Strength:  olmesartan (BENICAR) 10 MG Tab    How is the patient currently taking it? (ex. 1XDay):  as directed    Is this a 30 day or 90 day RX:  90    Preferred Pharmacy with phone number:    [facility]/pharmacy #[id number] [address] [address]  Phone: [phone] Fax: [phone]    Local or Mail Order:  Local    Ordering Provider:  Previous dr    Would the patient rather a call back or a response via [facility]?  Call back    Best Call Back Number:  [phone]    Additional Information:  Pt's kidney dr has taken off him the Crestor yesterday. The wrong medication was sent in for pt. HECTOR Guy was suppose to send in Benicar 10 mg in stead of 20 mg.  Please call back to advise. Thanks!

## 2024-03-01 ENCOUNTER — TELEPHONE (OUTPATIENT)
Dept: NEPHROLOGY | Facility: CLINIC | Age: 69
End: 2024-03-01
Payer: MEDICARE

## 2024-03-01 RX ORDER — PREDNISONE 20 MG/1
20 TABLET ORAL DAILY
Qty: 3 TABLET | Refills: 0 | Status: SHIPPED | OUTPATIENT
Start: 2024-03-01

## 2024-03-01 NOTE — TELEPHONE ENCOUNTER
I'm not sure who is treating his gout.  I would rather he not take indocin.  It is tough on kidneys.  If he has a bad flare, I'd rather him take a steroid for a couple of days if needed.

## 2024-03-01 NOTE — TELEPHONE ENCOUNTER
----- Message from Angelo Jones sent at 3/1/2024 10:52 AM CST -----  Regarding: med question  Contact: wife at [phone]  Type: Needs Medical Advice    Who Called:  wife // Gabi Oliver Call Back Number: [phone]    Additional Information: wife needs to make sure pt is talking all meds that he should be taking. Please call to verify med list and meds pt should be taking. Pt is getting different answer from PCP.

## 2024-03-01 NOTE — TELEPHONE ENCOUNTER
Patient's wife called and  put patient on benicar 10 mg daily. B/p readings for the last 3 days: Wednesday 144/81,Tuesday 136/81 , and today 135/81. Patient's wife thinks he should take the benicar for b/p. The next medication in question is the colchicine. The patient broke out in a rash in groin area,stomach, back and legs. She thinks its from the colchicine. She would also like to know if patient should be taking the Asprin and the crestor. Patient not taking either at the moment.Is it ok to take the indomethacin for gout flare up because she says it works better. I am sending this message to . Please advise.

## 2024-03-01 NOTE — TELEPHONE ENCOUNTER
When I last saw him his blood pressure was low.    If he needs it, I am fine with him taking benicar.    If colchicine gives him a rash then I would not take it at all.    I am fine with aspirin.    I also am ok with the crestor.    Indomethicin for gout should not be taken often as it tends to be tough on kidneys.\

## 2024-03-01 NOTE — TELEPHONE ENCOUNTER
Patient has a refill request for indomethicin bc he had a reaction to the colchicine. I am sending this message to . Please advise.

## 2024-03-06 ENCOUNTER — TELEPHONE (OUTPATIENT)
Dept: FAMILY MEDICINE | Facility: CLINIC | Age: 69
End: 2024-03-06
Payer: MEDICARE

## 2024-03-06 NOTE — TELEPHONE ENCOUNTER
Spoke with pt wife and she stated that after talking to Dr Rojo office that he was ok with the pt taking the Indocin moderately because they didn't feel like the Colcrys was helping with his flare ups.    Dr Rojo noted in his chart on 3/1 that he did not recommend he take the Indocin because it was hard on the kidneys. I called the pt wife back to let her know what was said by Dr Rojo.    Wife would like to know if ok for him to take both the Allopurinol and Colcrys together when he has the flare ups or do you recommend he just take the Colcyrs.

## 2024-03-07 RX ORDER — LEVOCETIRIZINE DIHYDROCHLORIDE 5 MG/1
5 TABLET, FILM COATED ORAL NIGHTLY
Qty: 90 TABLET | Refills: 3 | Status: SHIPPED | OUTPATIENT
Start: 2024-03-07 | End: 2025-03-02

## 2024-03-07 NOTE — TELEPHONE ENCOUNTER
----- Message from Darling Badilol sent at 3/6/2024 12:02 PM CST -----  Regarding: Refill request  Contact: pt' wifemarcellus  Type:  RX Refill Request    Who Called: pt' wifemarcellus    Refill or New Rx: refill    RX Name and Strength:Levocetirivine  5mg    How is the patient currently taking it? (ex. 1XDay): as needed    Is this a 30 day or 90 day RX:30    Preferred Pharmacy with phone number:  [facility]/pharmacy #[id number] [address] [address]  Phone: [phone] Fax: [phone]    Local or Mail Order:local  Ordering Provider:Brooks    Would the patient rather a call back or a response via [facility]? Call back    Best Call Back Number:[phone]  marcellus    Additional Information: refill request  Please advise.  Thank you.

## 2024-03-07 NOTE — TELEPHONE ENCOUNTER
According to Dr Rojo, He recommends prednisone. Indocin can lead to acute renal failure resulting in dialysis and death. I have discussed this with them on several occasions. I will be glad to refer him to Rheumatology if hs wants.

## 2024-03-25 ENCOUNTER — PATIENT MESSAGE (OUTPATIENT)
Dept: ADMINISTRATIVE | Facility: HOSPITAL | Age: 69
End: 2024-03-25
Payer: MEDICARE

## 2024-04-29 DIAGNOSIS — E78.5 HYPERLIPIDEMIA, UNSPECIFIED HYPERLIPIDEMIA TYPE: ICD-10-CM

## 2024-04-29 DIAGNOSIS — I10 PRIMARY HYPERTENSION: ICD-10-CM

## 2024-04-29 RX ORDER — ASPIRIN 81 MG/1
81 TABLET ORAL
Qty: 90 TABLET | Refills: 0 | Status: SHIPPED | OUTPATIENT
Start: 2024-04-29

## 2024-08-26 ENCOUNTER — PATIENT MESSAGE (OUTPATIENT)
Dept: ADMINISTRATIVE | Facility: HOSPITAL | Age: 69
End: 2024-08-26
Payer: MEDICARE

## 2024-08-27 ENCOUNTER — LAB VISIT (OUTPATIENT)
Dept: LAB | Facility: HOSPITAL | Age: 69
End: 2024-08-27
Attending: PHYSICIAN ASSISTANT
Payer: MEDICARE

## 2024-08-27 DIAGNOSIS — N18.30 TYPE 2 DM WITH CKD STAGE 3 AND HYPERTENSION: ICD-10-CM

## 2024-08-27 DIAGNOSIS — E11.22 TYPE 2 DM WITH CKD STAGE 3 AND HYPERTENSION: ICD-10-CM

## 2024-08-27 DIAGNOSIS — I12.9 TYPE 2 DM WITH CKD STAGE 3 AND HYPERTENSION: ICD-10-CM

## 2024-08-27 LAB
ESTIMATED AVG GLUCOSE: 108 MG/DL (ref 68–131)
HBA1C MFR BLD: 5.4 % (ref 4–5.6)

## 2024-08-27 PROCEDURE — 36415 COLL VENOUS BLD VENIPUNCTURE: CPT | Mod: PO | Performed by: PHYSICIAN ASSISTANT

## 2024-08-27 PROCEDURE — 83036 HEMOGLOBIN GLYCOSYLATED A1C: CPT | Performed by: PHYSICIAN ASSISTANT

## 2024-09-10 ENCOUNTER — PATIENT MESSAGE (OUTPATIENT)
Dept: ADMINISTRATIVE | Facility: HOSPITAL | Age: 69
End: 2024-09-10
Payer: MEDICARE

## 2024-10-29 DIAGNOSIS — E03.9 HYPOTHYROIDISM, UNSPECIFIED TYPE: ICD-10-CM

## 2024-10-29 RX ORDER — LEVOTHYROXINE SODIUM 137 UG/1
137 TABLET ORAL
Qty: 90 TABLET | Refills: 0 | Status: SHIPPED | OUTPATIENT
Start: 2024-10-29

## 2024-11-27 DIAGNOSIS — N18.31 CHRONIC KIDNEY DISEASE, STAGE 3A: ICD-10-CM

## 2024-11-27 DIAGNOSIS — E11.9 TYPE 2 DIABETES MELLITUS WITHOUT COMPLICATION: ICD-10-CM

## 2025-02-03 ENCOUNTER — OFFICE VISIT (OUTPATIENT)
Dept: PRIMARY CARE CLINIC | Facility: CLINIC | Age: 70
End: 2025-02-03
Payer: MEDICARE

## 2025-02-03 VITALS
BODY MASS INDEX: 35.71 KG/M2 | DIASTOLIC BLOOD PRESSURE: 70 MMHG | WEIGHT: 255.06 LBS | HEART RATE: 68 BPM | SYSTOLIC BLOOD PRESSURE: 116 MMHG | HEIGHT: 71 IN

## 2025-02-03 DIAGNOSIS — E53.8 VITAMIN B 12 DEFICIENCY: ICD-10-CM

## 2025-02-03 DIAGNOSIS — M10.9 GOUT, UNSPECIFIED CAUSE, UNSPECIFIED CHRONICITY, UNSPECIFIED SITE: ICD-10-CM

## 2025-02-03 DIAGNOSIS — I12.9 TYPE 2 DM WITH CKD STAGE 3 AND HYPERTENSION: ICD-10-CM

## 2025-02-03 DIAGNOSIS — N18.30 TYPE 2 DM WITH CKD STAGE 3 AND HYPERTENSION: ICD-10-CM

## 2025-02-03 DIAGNOSIS — E11.22 TYPE 2 DM WITH CKD STAGE 3 AND HYPERTENSION: ICD-10-CM

## 2025-02-03 DIAGNOSIS — N18.31 STAGE 3A CHRONIC KIDNEY DISEASE: ICD-10-CM

## 2025-02-03 DIAGNOSIS — E11.69 TYPE 2 DIABETES MELLITUS WITH OTHER SPECIFIED COMPLICATION, WITHOUT LONG-TERM CURRENT USE OF INSULIN: Primary | ICD-10-CM

## 2025-02-03 DIAGNOSIS — E78.5 HYPERLIPIDEMIA, UNSPECIFIED HYPERLIPIDEMIA TYPE: ICD-10-CM

## 2025-02-03 DIAGNOSIS — Z12.5 SCREENING FOR MALIGNANT NEOPLASM OF PROSTATE: ICD-10-CM

## 2025-02-03 LAB
BASOPHILS # BLD AUTO: 0.03 K/UL (ref 0–0.2)
BASOPHILS NFR BLD: 0.5 % (ref 0–1.9)
DIFFERENTIAL METHOD BLD: ABNORMAL
EOSINOPHIL # BLD AUTO: 0.3 K/UL (ref 0–0.5)
EOSINOPHIL NFR BLD: 5.1 % (ref 0–8)
ERYTHROCYTE [DISTWIDTH] IN BLOOD BY AUTOMATED COUNT: 13.3 % (ref 11.5–14.5)
HCT VFR BLD AUTO: 43.2 % (ref 40–54)
HGB BLD-MCNC: 14 G/DL (ref 14–18)
IMM GRANULOCYTES # BLD AUTO: 0.01 K/UL (ref 0–0.04)
IMM GRANULOCYTES NFR BLD AUTO: 0.2 % (ref 0–0.5)
LYMPHOCYTES # BLD AUTO: 2.5 K/UL (ref 1–4.8)
LYMPHOCYTES NFR BLD: 39.4 % (ref 18–48)
MCH RBC QN AUTO: 31.9 PG (ref 27–31)
MCHC RBC AUTO-ENTMCNC: 32.4 G/DL (ref 32–36)
MCV RBC AUTO: 98 FL (ref 82–98)
MONOCYTES # BLD AUTO: 0.6 K/UL (ref 0.3–1)
MONOCYTES NFR BLD: 10.3 % (ref 4–15)
NEUTROPHILS # BLD AUTO: 2.8 K/UL (ref 1.8–7.7)
NEUTROPHILS NFR BLD: 44.5 % (ref 38–73)
NRBC BLD-RTO: 0 /100 WBC
PLATELET # BLD AUTO: 240 K/UL (ref 150–450)
PMV BLD AUTO: 10.1 FL (ref 9.2–12.9)
RBC # BLD AUTO: 4.39 M/UL (ref 4.6–6.2)
WBC # BLD AUTO: 6.22 K/UL (ref 3.9–12.7)

## 2025-02-03 PROCEDURE — 80053 COMPREHEN METABOLIC PANEL: CPT | Performed by: PHYSICIAN ASSISTANT

## 2025-02-03 PROCEDURE — 3008F BODY MASS INDEX DOCD: CPT | Mod: CPTII,S$GLB,, | Performed by: PHYSICIAN ASSISTANT

## 2025-02-03 PROCEDURE — 84153 ASSAY OF PSA TOTAL: CPT | Performed by: PHYSICIAN ASSISTANT

## 2025-02-03 PROCEDURE — 1160F RVW MEDS BY RX/DR IN RCRD: CPT | Mod: CPTII,S$GLB,, | Performed by: PHYSICIAN ASSISTANT

## 2025-02-03 PROCEDURE — 84443 ASSAY THYROID STIM HORMONE: CPT | Performed by: PHYSICIAN ASSISTANT

## 2025-02-03 PROCEDURE — 3288F FALL RISK ASSESSMENT DOCD: CPT | Mod: CPTII,S$GLB,, | Performed by: PHYSICIAN ASSISTANT

## 2025-02-03 PROCEDURE — 85025 COMPLETE CBC W/AUTO DIFF WBC: CPT | Performed by: PHYSICIAN ASSISTANT

## 2025-02-03 PROCEDURE — 1101F PT FALLS ASSESS-DOCD LE1/YR: CPT | Mod: CPTII,S$GLB,, | Performed by: PHYSICIAN ASSISTANT

## 2025-02-03 PROCEDURE — 82607 VITAMIN B-12: CPT | Performed by: PHYSICIAN ASSISTANT

## 2025-02-03 PROCEDURE — 1125F AMNT PAIN NOTED PAIN PRSNT: CPT | Mod: CPTII,S$GLB,, | Performed by: PHYSICIAN ASSISTANT

## 2025-02-03 PROCEDURE — 99214 OFFICE O/P EST MOD 30 MIN: CPT | Mod: S$GLB,,, | Performed by: PHYSICIAN ASSISTANT

## 2025-02-03 PROCEDURE — 80061 LIPID PANEL: CPT | Performed by: PHYSICIAN ASSISTANT

## 2025-02-03 PROCEDURE — 3078F DIAST BP <80 MM HG: CPT | Mod: CPTII,S$GLB,, | Performed by: PHYSICIAN ASSISTANT

## 2025-02-03 PROCEDURE — 84550 ASSAY OF BLOOD/URIC ACID: CPT | Performed by: PHYSICIAN ASSISTANT

## 2025-02-03 PROCEDURE — 36415 COLL VENOUS BLD VENIPUNCTURE: CPT | Mod: S$GLB,,, | Performed by: PHYSICIAN ASSISTANT

## 2025-02-03 PROCEDURE — 1159F MED LIST DOCD IN RCRD: CPT | Mod: CPTII,S$GLB,, | Performed by: PHYSICIAN ASSISTANT

## 2025-02-03 PROCEDURE — 3074F SYST BP LT 130 MM HG: CPT | Mod: CPTII,S$GLB,, | Performed by: PHYSICIAN ASSISTANT

## 2025-02-03 PROCEDURE — 83036 HEMOGLOBIN GLYCOSYLATED A1C: CPT | Performed by: PHYSICIAN ASSISTANT

## 2025-02-03 NOTE — Clinical Note
Is there anyway you can check to see if this patient has had a HAIC > 6.5? If not, can we put him on the prediabetic roster

## 2025-02-03 NOTE — PROGRESS NOTES
Subjective     Patient ID: Asim Wilson is a 69 y.o. male.    Chief Complaint: Annual Exam    Patient is a 68 yo male who comes in today for a check up. He recently underwent back surgery and has been doing well since then.     Patient Active Problem List:     Age-related nuclear cataract of right eye     Age-related nuclear cataract of left eye     Gout     Hyperlipidemia     Hypothyroidism     Tinnitus of both ears     RBBB (right bundle branch block)     CAD in native artery - mild non-obstructive by cath 12/2020     Dyslipidemia     Obesity, Class I, BMI 30-34.9     Hyperuricemia     Type 2 diabetes mellitus, without long-term current use of insulin     Type 2 DM with CKD stage 3 and hypertension     Chronic kidney disease, stage 3a     Aortic atherosclerosis     Mitral annular calcification     Encounter for pre-operative cardiovascular clearance         Review of Systems   Constitutional:  Negative for fatigue.   Respiratory:  Negative for chest tightness and shortness of breath.    Cardiovascular:  Negative for chest pain.   Gastrointestinal:  Negative for abdominal pain.   Neurological:  Negative for dizziness and syncope.          Objective     Physical Exam  Vitals reviewed.   Constitutional:       General: He is not in acute distress.     Appearance: Normal appearance. He is not ill-appearing, toxic-appearing or diaphoretic.   Neck:      Vascular: No carotid bruit.   Cardiovascular:      Rate and Rhythm: Normal rate and regular rhythm.      Pulses: Normal pulses.      Heart sounds: Normal heart sounds. No murmur heard.     No friction rub. No gallop.   Pulmonary:      Effort: Pulmonary effort is normal. No respiratory distress.      Breath sounds: Normal breath sounds. No stridor. No wheezing, rhonchi or rales.   Chest:      Chest wall: No tenderness.   Abdominal:      Palpations: Abdomen is soft.      Tenderness: There is no abdominal tenderness.   Musculoskeletal:      Cervical back: No rigidity or  tenderness.   Lymphadenopathy:      Cervical: No cervical adenopathy.   Neurological:      Mental Status: He is alert.            Assessment and Plan     1. Type 2 diabetes mellitus with other specified complication, without long-term current use of insulin  -     Comprehensive Metabolic Panel; Future; Expected date: 02/03/2025  -     Hemoglobin A1C; Future; Expected date: 02/03/2025    2. Vitamin B 12 deficiency  -     Vitamin B12; Future; Expected date: 02/03/2025    3. Type 2 DM with CKD stage 3 and hypertension  -     CBC Auto Differential; Future; Expected date: 02/03/2025    4. Hyperlipidemia, unspecified hyperlipidemia type  -     Comprehensive Metabolic Panel; Future; Expected date: 02/03/2025  -     Lipid Panel; Future; Expected date: 02/03/2025  -     TSH; Future; Expected date: 02/03/2025    5. Screening for malignant neoplasm of prostate  -     PSA, Screening; Future; Expected date: 02/03/2025  -     Hemoglobin A1C; Future; Expected date: 02/03/2025    6. Gout, unspecified cause, unspecified chronicity, unspecified site  -     URIC ACID; Future; Expected date: 02/03/2025    7. Stage 3a chronic kidney disease  The current medical regimen is effective;  continue present plan and medications.                No follow-ups on file.

## 2025-02-04 ENCOUNTER — TELEPHONE (OUTPATIENT)
Dept: PRIMARY CARE CLINIC | Facility: CLINIC | Age: 70
End: 2025-02-04
Payer: MEDICARE

## 2025-02-04 LAB
ALBUMIN SERPL BCP-MCNC: 3.8 G/DL (ref 3.5–5.2)
ALP SERPL-CCNC: 64 U/L (ref 40–150)
ALT SERPL W/O P-5'-P-CCNC: 38 U/L (ref 10–44)
ANION GAP SERPL CALC-SCNC: 10 MMOL/L (ref 8–16)
AST SERPL-CCNC: 31 U/L (ref 10–40)
BILIRUB SERPL-MCNC: 0.5 MG/DL (ref 0.1–1)
BUN SERPL-MCNC: 18 MG/DL (ref 8–23)
CALCIUM SERPL-MCNC: 9 MG/DL (ref 8.7–10.5)
CHLORIDE SERPL-SCNC: 108 MMOL/L (ref 95–110)
CHOLEST SERPL-MCNC: 191 MG/DL (ref 120–199)
CHOLEST/HDLC SERPL: 5.2 {RATIO} (ref 2–5)
CO2 SERPL-SCNC: 21 MMOL/L (ref 23–29)
COMPLEXED PSA SERPL-MCNC: 0.34 NG/ML (ref 0–4)
CREAT SERPL-MCNC: 1 MG/DL (ref 0.5–1.4)
EST. GFR  (NO RACE VARIABLE): >60 ML/MIN/1.73 M^2
ESTIMATED AVG GLUCOSE: 111 MG/DL (ref 68–131)
GLUCOSE SERPL-MCNC: 88 MG/DL (ref 70–110)
HBA1C MFR BLD: 5.5 % (ref 4–5.6)
HDLC SERPL-MCNC: 37 MG/DL (ref 40–75)
HDLC SERPL: 19.4 % (ref 20–50)
LDLC SERPL CALC-MCNC: 124.6 MG/DL (ref 63–159)
NONHDLC SERPL-MCNC: 154 MG/DL
POTASSIUM SERPL-SCNC: 4.1 MMOL/L (ref 3.5–5.1)
PROT SERPL-MCNC: 7.8 G/DL (ref 6–8.4)
SODIUM SERPL-SCNC: 139 MMOL/L (ref 136–145)
TRIGL SERPL-MCNC: 147 MG/DL (ref 30–150)
TSH SERPL DL<=0.005 MIU/L-ACNC: 3.26 UIU/ML (ref 0.4–4)
URATE SERPL-MCNC: 9.8 MG/DL (ref 3.4–7)
VIT B12 SERPL-MCNC: 210 PG/ML (ref 210–950)

## 2025-02-04 NOTE — TELEPHONE ENCOUNTER
----- Message from Naren Guy PA-C sent at 2/4/2025 11:51 AM CST -----  The lab shows the uric acid level is up a little, but stable.   The B12 level is low. I recommend over the counter B12 daily  The cholesterol level is a little high    The rest of the lab is within an accepted range.

## 2025-02-14 ENCOUNTER — TELEPHONE (OUTPATIENT)
Dept: PRIMARY CARE CLINIC | Facility: CLINIC | Age: 70
End: 2025-02-14
Payer: MEDICARE

## 2025-02-14 NOTE — TELEPHONE ENCOUNTER
----- Message from Cecile sent at 2/12/2025 10:08 AM CST -----  Contact: Maria Fernanda  Type: Needs Medical Advice    Who Called: Maria Fernanda/ Christelle  Best Call Back Number: [phone], FAX# [phone]  Additional  Information: Requesting call back have been trying to fax medical clearance form  since yesterday that's not going through  Please Advise- Thank you

## 2025-03-11 ENCOUNTER — TELEPHONE (OUTPATIENT)
Dept: PRIMARY CARE CLINIC | Facility: CLINIC | Age: 70
End: 2025-03-11
Payer: MEDICARE

## 2025-03-11 RX ORDER — AMOXICILLIN AND CLAVULANATE POTASSIUM 875; 125 MG/1; MG/1
1 TABLET, FILM COATED ORAL 2 TIMES DAILY
Qty: 20 TABLET | Refills: 0 | Status: SHIPPED | OUTPATIENT
Start: 2025-03-11

## 2025-03-11 NOTE — TELEPHONE ENCOUNTER
----- Message from Rashi sent at 3/11/2025 12:52 PM CDT -----  Type: Needs Medical AdviceWho Called:  Pt's wifeSymptoms (please be specific):  green flem, body aches, sore throat leading towards right ear canalHow long has patient had these symptoms:  3 days Pharmacy name and phone #:  [id number] - [place], [place] - [address] [facility] [address] [address]Cofkb: [phone] Fax: [phone] Call Back Number: [phone] Information: PT'S wife requesting call back as pt is ill. They can't make it to an [facility], want antibiotic.  Please call back to advise, Thanks!

## 2025-03-11 NOTE — TELEPHONE ENCOUNTER
----- Message from Jian Jeffery sent at 3/11/2025  2:05 PM CDT -----  Please advise.  ----- Message -----  From: Rashi Koenig  Sent: 3/11/2025  12:55 PM CDT  To: [facility]NEAL III Staff    Type: Needs Medical AdviceWho Called:  Pt's wifeSymptoms (please be specific):  green flem, body aches, sore throat leading towards right ear canalHow long has patient had these symptoms:  3 days Pharmacy name and phone #:  [id number] - [address] [address]Ivjqr: [phone] Fax: [phone] Call Back Number: [phone] Information: PT'S wife requesting call back as pt is ill. They can't make it to an [facility], want antibiotic.  Please call back to advise, Thanks!

## 2025-03-24 DIAGNOSIS — Z00.00 ENCOUNTER FOR MEDICARE ANNUAL WELLNESS EXAM: ICD-10-CM

## 2025-03-24 RX ORDER — LEVOCETIRIZINE DIHYDROCHLORIDE 5 MG/1
5 TABLET, FILM COATED ORAL NIGHTLY
Qty: 90 TABLET | Refills: 3 | Status: SHIPPED | OUTPATIENT
Start: 2025-03-24

## 2025-03-24 RX ORDER — OLMESARTAN MEDOXOMIL 5 MG/1
10 TABLET ORAL
Qty: 180 TABLET | Refills: 3 | Status: SHIPPED | OUTPATIENT
Start: 2025-03-24

## 2025-04-07 ENCOUNTER — DOCUMENT SCAN (OUTPATIENT)
Dept: HOME HEALTH SERVICES | Facility: HOSPITAL | Age: 70
End: 2025-04-07
Payer: MEDICARE

## 2025-04-15 ENCOUNTER — TELEPHONE (OUTPATIENT)
Dept: PRIMARY CARE CLINIC | Facility: CLINIC | Age: 70
End: 2025-04-15
Payer: MEDICARE

## 2025-04-15 NOTE — TELEPHONE ENCOUNTER
----- Message from Cherie sent at 4/15/2025  8:17 AM CDT -----  Contact: Gabi [phone]  Type: Needs Medical AdviceWho Called:  Pts wife Gabi Oliver Call Back Number: [phone] Information: calling to ask if their is any forwarding info for Mr Guy. Pls call back

## 2025-04-15 NOTE — TELEPHONE ENCOUNTER
Spoke to pt wife to let her know that dr. Guy is with [place] now and gave her the number to schedule appointment with him at the new clinic.

## 2025-07-14 ENCOUNTER — TELEPHONE (OUTPATIENT)
Dept: FAMILY MEDICINE | Facility: CLINIC | Age: 70
End: 2025-07-14
Payer: MEDICARE

## 2025-07-14 NOTE — TELEPHONE ENCOUNTER
Copied from CRM #[id number]. Topic: Appointments - Appointment Scheduling  >> Jul 14, 2025  9:10 AM Belle wrote:  Type:  Same Day Appointment Request    Caller is requesting a same day appointment.  Caller declined first available appointment listed below.    Name of Caller:pt wife Gabi  When is the first available appointment?non  Symptoms:pain in chest & back, lay on rt side pain, effecting his voice  Best Call Back Number:[phone]    Additional Information: Pt would like to see Ms Ellsworth in [place]

## (undated) DEVICE — SHIELD COLLAGEN 12HR CORNEAL

## (undated) DEVICE — APPLICATOR CHLORAPREP CLR 10.5

## (undated) DEVICE — GLOVE PROTEXIS HYDROGEL SZ8

## (undated) DEVICE — SEE MEDLINE ITEM 157128

## (undated) DEVICE — NDL SPINAL SPINOCAN 22GX3.5

## (undated) DEVICE — GLOVE 7.5 PROTEXIS PI MICRO

## (undated) DEVICE — SYR 3CC LUER LOC

## (undated) DEVICE — SOL IRR BSS OPHTH 500ML STRL

## (undated) DEVICE — COVER OVERHEAD SURG LT BLUE

## (undated) DEVICE — SYR DISP LL 5CC

## (undated) DEVICE — TRAY NERVE BLOCK

## (undated) DEVICE — SOL BETADINE 5%

## (undated) DEVICE — GOWN SURG 2XL DISP TIE BACK

## (undated) DEVICE — SOL SOD CHLORIDE 0.9% 10ML

## (undated) DEVICE — TOWEL OR DISP STRL BLUE 4/PK

## (undated) DEVICE — PACK EYE CUSTOM COVINGTON.

## (undated) DEVICE — TIP I/A CURVED SINGLE USE

## (undated) DEVICE — TOWEL OR NONABSORB ADH 17X26

## (undated) DEVICE — PACK OPHTHALMIC

## (undated) DEVICE — SEE MEDLINE ITEM 152622

## (undated) DEVICE — SPONGE WEC CEL SPEARS

## (undated) DEVICE — NDL SPINAL 25GX3.5 SPINOCAN